# Patient Record
Sex: FEMALE | Race: WHITE | NOT HISPANIC OR LATINO | URBAN - METROPOLITAN AREA
[De-identification: names, ages, dates, MRNs, and addresses within clinical notes are randomized per-mention and may not be internally consistent; named-entity substitution may affect disease eponyms.]

---

## 2017-01-01 ENCOUNTER — EMERGENCY (EMERGENCY)
Facility: HOSPITAL | Age: 37
LOS: 1 days | Discharge: HOME | End: 2017-01-01
Attending: EMERGENCY MEDICINE | Admitting: EMERGENCY MEDICINE
Payer: MEDICARE

## 2017-01-01 VITALS
HEART RATE: 105 BPM | OXYGEN SATURATION: 96 % | RESPIRATION RATE: 16 BRPM | TEMPERATURE: 98 F | DIASTOLIC BLOOD PRESSURE: 84 MMHG | SYSTOLIC BLOOD PRESSURE: 145 MMHG

## 2017-01-01 NOTE — ED PROVIDER NOTE - PROGRESS NOTE DETAILS
Noted CXR. Pt denies any abd pain, nausea, vomiting, or change in BM. Abd soft, non-tender. Pt states cough improved with Tessalon Pearls.

## 2017-01-01 NOTE — ED PROVIDER NOTE - OBJECTIVE STATEMENT
35 y/o female with PMHx of MR, Hypothyroidism, and Bipolar presents to ED for cough X days. Pt states having a non-productive cough over the past few days. Pt denies any fever, chills, SOB, chest pain, or abd pain.

## 2017-01-02 PROCEDURE — 71020: CPT | Mod: 26

## 2017-01-02 PROCEDURE — 99283 EMERGENCY DEPT VISIT LOW MDM: CPT | Mod: 25

## 2017-01-02 RX ADMIN — Medication 100 MILLIGRAM(S): at 00:43

## 2017-01-16 ENCOUNTER — EMERGENCY (EMERGENCY)
Facility: HOSPITAL | Age: 37
LOS: 1 days | Discharge: HOME | End: 2017-01-16
Admitting: EMERGENCY MEDICINE
Payer: MEDICARE

## 2017-01-16 VITALS
OXYGEN SATURATION: 97 % | SYSTOLIC BLOOD PRESSURE: 105 MMHG | HEART RATE: 95 BPM | TEMPERATURE: 99 F | RESPIRATION RATE: 16 BRPM | DIASTOLIC BLOOD PRESSURE: 67 MMHG

## 2017-01-16 DIAGNOSIS — F68.8 OTHER SPECIFIED DISORDERS OF ADULT PERSONALITY AND BEHAVIOR: ICD-10-CM

## 2017-01-16 DIAGNOSIS — F31.9 BIPOLAR DISORDER, UNSPECIFIED: ICD-10-CM

## 2017-01-16 PROCEDURE — 90792 PSYCH DIAG EVAL W/MED SRVCS: CPT | Mod: GC

## 2017-01-16 PROCEDURE — 99284 EMERGENCY DEPT VISIT MOD MDM: CPT

## 2017-01-16 NOTE — ED PROVIDER NOTE - MEDICAL DECISION MAKING DETAILS
This is a 36 year olf Female PMHx Bipolar, Hypothyroid and MR BIBA from residence for psych eval r/t agitation Medical evaluation performed. There is no clinical evidence of intoxication or any acute medical problem requiring immediate intervention. Final disposition will be determined by psychiatrist.
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
## 2017-01-16 NOTE — ED BEHAVIORAL HEALTH ASSESSMENT NOTE - SUICIDE PROTECTIVE FACTORS
Supportive social network or family/Other/Positive therapeutic relationships/Identifies reasons for living/Future oriented Positive therapeutic relationships/Supportive social network or family/Identifies reasons for living/Future oriented

## 2017-01-16 NOTE — ED PROVIDER NOTE - OBJECTIVE STATEMENT
This is a 36 year olf Female PMHx Bipolar, Hypothyroid and MR BIBA from residence for psych eval r/t agitation. Patient states that after she went for ice cream with the group home, when she got out of the van, her knee started hurting and she demanded to go to the hospital. She became upset when she was offered Tylenol and got angry and stormed out of the residence and admits to getting aggressive with staff. Denies chest pain, SOB, N/V/D and fevers, Denies palpitations or diaphoresis. Denies Numbness, Tingling, Blurry Vision and HA.  Denies suicidal/homicidal thoughts. Denies visual/auditory hallucinations. Denies ETOH/Illicit drug use. Denies recent falls, trauma and injuries. Denies pain or any other medical complaints.
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
## 2017-01-16 NOTE — ED BEHAVIORAL HEALTH ASSESSMENT NOTE - CURRENT MEDICATION
lithium ER 450mg BID, Cogentin 1 mg BID, Invega ER 9mg QHS, Colace 2 caps QHS, Synthroid 0.075mg daily, senna 8.6 mg QHS, trazodone 50mg QHS, lactulose 30ml QHS, omega fish oil 1000mg BID, Topamax 200mg BID, Seroquel 200mg QHS, Calcium +Vit D 400IU daily, Klonopin 1 mg BID, Latanoprost 0.005% eye drops each eye QHS, ammonium lactate 12% apply to feet QHS, Aspercreme lotion, vitamin D3 1000units daily, Econazole nit 1% creme BID, Miconazorb AF 2% powder BID, Erythromycin BID.

## 2017-01-16 NOTE — ED BEHAVIORAL HEALTH ASSESSMENT NOTE - SAFETY PLAN DETAILS
The patient and family were advised to return to the Emergency Department or call 911 for any significant worsening of symptoms, and to restrict access to lethal means (pills, sharps, firearms, heights).  The patient and family agreed.

## 2017-01-16 NOTE — ED BEHAVIORAL HEALTH ASSESSMENT NOTE - DESCRIPTION
Patient is calm and cooperative, apologetic, and denying current suicidality or psychotic symptoms. obesity, hypothyroidism, glaucoma Lives in [facility] group home since Summer 2015

## 2017-01-16 NOTE — ED BEHAVIORAL HEALTH ASSESSMENT NOTE - RISK ASSESSMENT
Chronic risks include impulsivity, poor frustration tolerance, MR, past psychiatric admissions, and hx of violence and aggression. Protective factors include lack of current HI/SI, calm and cooperative in ER, supportive living situation, compliant with meds. No Chronic risks include impulsivity, poor frustration tolerance, MR, past psychiatric admissions, and hx of violence and aggression. Protective factors include lack of current HI/SI, calm and cooperative in ER, supportive living situation, compliant with meds. Inpatient treatment is unlikely to mitigate these chronic risks factors to any significant degree. She does not meet criteria for involuntary hospitalization.
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
## 2017-04-05 ENCOUNTER — EMERGENCY (EMERGENCY)
Facility: HOSPITAL | Age: 37
LOS: 1 days | Discharge: HOME | End: 2017-04-05
Attending: EMERGENCY MEDICINE | Admitting: EMERGENCY MEDICINE
Payer: MEDICARE

## 2017-04-05 VITALS
OXYGEN SATURATION: 99 % | TEMPERATURE: 98 F | HEART RATE: 78 BPM | SYSTOLIC BLOOD PRESSURE: 140 MMHG | RESPIRATION RATE: 18 BRPM | DIASTOLIC BLOOD PRESSURE: 90 MMHG

## 2017-04-05 PROCEDURE — 99283 EMERGENCY DEPT VISIT LOW MDM: CPT

## 2017-04-05 RX ORDER — IBUPROFEN 200 MG
600 TABLET ORAL ONCE
Qty: 0 | Refills: 0 | Status: COMPLETED | OUTPATIENT
Start: 2017-04-05 | End: 2017-04-05

## 2017-04-05 RX ORDER — ACETAMINOPHEN 500 MG
650 TABLET ORAL ONCE
Qty: 0 | Refills: 0 | Status: COMPLETED | OUTPATIENT
Start: 2017-04-05 | End: 2017-04-05

## 2017-04-05 RX ADMIN — Medication 650 MILLIGRAM(S): at 11:09

## 2017-04-05 RX ADMIN — Medication 600 MILLIGRAM(S): at 11:09

## 2017-04-05 NOTE — ED PROVIDER NOTE - OBJECTIVE STATEMENT
Pt c/o back pain, chronic per staff member- pt is in a group home.  No trauma, fever, dysuria.  Pt goes to PT and is on chronic pain management.  No numb/weak.  Tylenol/motrin usually work

## 2017-04-05 NOTE — ED PROVIDER NOTE - CARE PLAN
Principal Discharge DX:	Back pain  Goal:	followup care  Instructions for follow-up, activity and diet:	followup with your doctor and physical therapy in the next few days.  Take your usual pain medications for the back pain.  If worse pain, numbness, weakness, fever, pain with urination, or unable to walk or any worse symptoms return.  No heavy lifting.

## 2017-04-05 NOTE — ED ADULT TRIAGE NOTE - CHIEF COMPLAINT QUOTE
Patient brought to ER from [facility] ([phone] [place] [address]) for c/o lower back and knee pain.

## 2017-04-05 NOTE — ED PROVIDER NOTE - PLAN OF CARE
followup care followup with your doctor and physical therapy in the next few days.  Take your usual pain medications for the back pain.  If worse pain, numbness, weakness, fever, pain with urination, or unable to walk or any worse symptoms return.  No heavy lifting.

## 2017-04-05 NOTE — ED PROVIDER NOTE - CONSTITUTIONAL, MLM
normal... Well appearing, well nourished, awake, alert, oriented to person, place, time/situation and in mild pain distress.

## 2017-06-26 ENCOUNTER — APPOINTMENT (OUTPATIENT)
Dept: OBGYN | Facility: CLINIC | Age: 37
End: 2017-06-26

## 2017-06-26 VITALS
SYSTOLIC BLOOD PRESSURE: 112 MMHG | HEIGHT: 65 IN | DIASTOLIC BLOOD PRESSURE: 78 MMHG | WEIGHT: 288 LBS | BODY MASS INDEX: 47.98 KG/M2 | HEART RATE: 105 BPM

## 2017-06-26 DIAGNOSIS — Z86.69 PERSONAL HISTORY OF OTHER DISEASES OF THE NERVOUS SYSTEM AND SENSE ORGANS: ICD-10-CM

## 2017-06-26 DIAGNOSIS — Z86.59 PERSONAL HISTORY OF OTHER MENTAL AND BEHAVIORAL DISORDERS: ICD-10-CM

## 2017-06-26 RX ORDER — DOCUSATE SODIUM 100 MG/1
CAPSULE ORAL
Refills: 0 | Status: ACTIVE | COMMUNITY

## 2017-06-26 RX ORDER — MICONAZOLE NITRATE 20 MG/G
2 CREAM VAGINAL
Refills: 0 | Status: ACTIVE | COMMUNITY

## 2017-06-26 RX ORDER — LATANOPROST 50 UG/ML
0.01 SOLUTION OPHTHALMIC
Refills: 0 | Status: ACTIVE | COMMUNITY

## 2017-06-26 RX ORDER — MICONAZOLE
POWDER (GRAM) MISCELLANEOUS
Refills: 0 | Status: ACTIVE | COMMUNITY

## 2017-06-26 RX ORDER — SENNOSIDES 8.6 MG TABLETS 8.6 MG/1
TABLET ORAL
Refills: 0 | Status: ACTIVE | COMMUNITY

## 2017-06-28 LAB
DHEA-S SERPL-MCNC: 82.5 UG/DL
ESTRADIOL SERPL-MCNC: 26 PG/ML
FSH SERPL-MCNC: 8.5 IU/L
HCG SERPL-MCNC: <1 MIU/ML
HPV HIGH+LOW RISK DNA PNL CVX: NEGATIVE
LH SERPL-ACNC: 9.7 IU/L
PROLACTIN SERPL-MCNC: 78.7 NG/ML
TSH SERPL-ACNC: 6.33 UIU/ML

## 2017-07-03 LAB
CYTOLOGY CVX/VAG DOC THIN PREP: NORMAL
TESTOST BND SERPL-MCNC: 1.8 PG/ML
TESTOST SERPL-MCNC: 25.5 NG/DL

## 2017-07-24 ENCOUNTER — APPOINTMENT (OUTPATIENT)
Dept: OBGYN | Facility: CLINIC | Age: 37
End: 2017-07-24

## 2017-07-24 VITALS
DIASTOLIC BLOOD PRESSURE: 68 MMHG | WEIGHT: 293 LBS | SYSTOLIC BLOOD PRESSURE: 95 MMHG | HEART RATE: 80 BPM | BODY MASS INDEX: 48.82 KG/M2 | HEIGHT: 65 IN

## 2018-01-11 ENCOUNTER — APPOINTMENT (OUTPATIENT)
Dept: OBGYN | Facility: CLINIC | Age: 38
End: 2018-01-11
Payer: MEDICARE

## 2018-01-11 VITALS
DIASTOLIC BLOOD PRESSURE: 64 MMHG | WEIGHT: 286 LBS | HEIGHT: 65 IN | SYSTOLIC BLOOD PRESSURE: 99 MMHG | BODY MASS INDEX: 47.65 KG/M2 | HEART RATE: 86 BPM

## 2018-01-11 PROCEDURE — 99213 OFFICE O/P EST LOW 20 MIN: CPT

## 2018-01-12 LAB
HCG SERPL-MCNC: <1 MIU/ML
PROLACTIN SERPL-MCNC: 112.2 NG/ML
TSH SERPL-ACNC: 9.02 UIU/ML

## 2018-01-19 ENCOUNTER — ASOB RESULT (OUTPATIENT)
Age: 38
End: 2018-01-19

## 2018-01-19 ENCOUNTER — APPOINTMENT (OUTPATIENT)
Dept: OBGYN | Facility: CLINIC | Age: 38
End: 2018-01-19
Payer: MEDICARE

## 2018-01-19 PROCEDURE — 76856 US EXAM PELVIC COMPLETE: CPT

## 2018-01-22 NOTE — ED BEHAVIORAL HEALTH ASSESSMENT NOTE - ABNORMAL MOVEMENTS
1/22/2018       RE: Blank Yost  [address] [facility] [address]     Dear Colleague,    Thank you for referring your patient, Blank Yost, to the [facility] at [facility]. Please see a copy of my visit note below.    Referring provider: Jacy Amanda MD    Chief complaint: Left knee pain.    History of present illness: Blank is a very pleasant 37 year-old female who presents for evaluation of long-standing chronic left knee pain.  She reports over 20 years of left knee pain, progressively worsening.  She describes activity-related sharp and aching pain globally about the knee but most intense about the patellofemoral joint.  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  She notes intermittent periods of swelling about the knee.  She recently underwent a left knee arthroscopy, debridement, loose body removal, and chondroplasty of the patella, trochlear groove and medial femoral condyle.  This was performed by Dr. Shane Blackwood at [facility] in [place], [place] on 9/14/2017.  She reports no relief from this procedure and wonders if it even made her worse.  She feels debilitated by the pain.  Nothing has seemed to help her.    Past medical history: Tobacco dependence, chronic pain, depression, anxiety, gastroesophageal reflux disease.    Past surgical history: Left knee arthroscopy on 9/14/2017 as above.  Lumbar discectomy 2.5 years ago.    Medications: Cymbalta, Celexa, Abilify, buspirone, omeprazole, gabapentin, multivitamin.  Oxycodone as needed (reports taking on average 5 tabs per week; this is prescribed by her primary care provider).    Allergies: Sulfa drugs and Prozac.    Family history: Father had a history of a provoked DVT.  Otherwise no known genetic or family history of bleeding, clotting, or anesthesia related complications.    Review of systems: PHILIPP aguilar of systems attached to the bottom this  document and reviewed.    Social history: Patient lives in [facility] in a house with her boyfriend to him and her 13-year-old son.  She is worked in housekeeping since April 2017.  Is a full-time job and is quite physically demanding for her.  She does not participate in any activities or exercise.  She enjoys playing cards.  She does smoke 1 pack of cigarettes per day and has done so for 22 years.  She reports a previous history of alcohol dependence and has been sober for 5 years, with a brief relapse 16 months ago.  She denies any illicit drug use.    Physical exam: Pleasant 37-year-old female in no apparent distress.  Mood and affect somewhat blunted.  BMI 33.  Respirations nonlabored on room air.  Pleasant and cooperative with exam.  Extraocular eye movements intact.  Focused examination of the left knee demonstrate a warm and well-perfused foot with sensation intact light touch in the superficial peroneal, deep peroneal, tibial, saphenous, and sural nerve distributions.  She has 5 out of 5 motor strength to EHL, FHL, tibialis anterior, gastrocsoleus complex strength testing.  Her left knee demonstrates a moderate joint effusion and tenderness along the medial and lateral joint lines.  Knee range of motion is restricted from 10  shy of full extension to 125  of flexion, limited by pain at both extremes.  Midline patellar tracking with mild lateral patellar facet crepitus noted.  2 quadrants of lateral patellar laxity, 1 quadrant medial.  Knee is stable to varus and valgus stress at 0 and 30  of knee flexion.  Grade 0 Lachman's.    Imaging: AP and lateral radiographs of the left knee taken 8/1/2017 were reviewed.  This demonstrates well-maintained joint spaces with mild patella christopher.  IS ratio 1.59, CD ratio 1.35.    MRI dated 8/8/2017 was reviewed.  This demonstrates significant chondral wear of the lateral patellar facet.  The trochlea appears to be well preserved.  There are small areas of grade 3  chondral wear along the medial and lateral femoral condyles, but this is not extensive. TT-TG measures to be 10 mm. The medial and lateral meniscus are intact.  ACL, PCL, MCL, and LCL are intact.    Impression:  1.  Left knee patellofemoral arthritis, with high-grade chondral wear of the lateral patellar facet.  2.  Mild patella christopher with IS ratio 1.59 and CD ratio 1.35.  3.  TT-TG 10 mm.    4.  Status post left knee arthroscopy and chondroplasty without any relief.  5.  Chronic narcotic dependence.  6.  Tobacco dependence, 1 pack a day smoker.  7.  BMI 33.    Plan: We had a long discussion with Pham and her boyfriend today.  She reports significant and disabling ongoing left knee pain symptomatology.  We discussed the non-operative and surgical treatment options with her in detail at this point.  Non-operative options include activity modification, weight loss, physical therapy for strengthening, over-the-counter pain medications and anti-inflammatories, and corticosteroid injections.  We also discussed the option of trying patellofemoral taping with the physical therapist.  This may offload her lateral patellar facet and provide her significant benefit.    With regards to her surgical options, we did discuss the possibility of a cartilage reconstruction surgery (ACI vs caritform) of her lateral patellar facet that would include an anterior medialization of her tibial tubercle to offload this area.  This procedure was discussed in detail with the patient today.  We discussed the risks, benefits, and alternatives.  We offered no guarantees that this would make her better.  We also discussed that she would need to stop smoking in order to be a candidate for this type of extensive surgery.  She would like to think on this and she will contact us if she is able to quit smoking for a minimum of 2-3 months prior to surgery.  In the meantime she will follow-up with us on an as-needed basis moving forward.  All questions  and concerns were addressed.      Answers for HPI/ROS submitted by the patient on 1/22/2018   General Symptoms: Yes  Skin Symptoms: No  HENT Symptoms: No  EYE SYMPTOMS: No  HEART SYMPTOMS: No  LUNG SYMPTOMS: No  INTESTINAL SYMPTOMS: No  URINARY SYMPTOMS: No  GYNECOLOGIC SYMPTOMS: No  BREAST SYMPTOMS: Yes  SKELETAL SYMPTOMS: Yes  BLOOD SYMPTOMS: No  NERVOUS SYSTEM SYMPTOMS: No  MENTAL HEALTH SYMPTOMS: Yes  Fever: No  Loss of appetite: No  Weight loss: No  Weight gain: No  Fatigue: Yes  Night sweats: No  Chills: No  Increased stress: Yes  Excessive hunger: No  Excessive thirst: No  Feeling hot or cold when others believe the temperature is normal: No  Loss of height: No  Post-operative complications: No  Surgical site pain: No  Hallucinations: No  Change in or Loss of Energy: No  Hyperactivity: No  Confusion: No  Back pain: Yes  Muscle aches: No  Neck pain: No  Swollen joints: Yes  Joint pain: Yes  Bone pain: No  Muscle cramps: No  Muscle weakness: No  Joint stiffness: Yes  Bone fracture: No  Discharge: No  Lumps: Yes  Pain: Yes  Nipple retraction: No  Nervous or Anxious: Yes  Depression: Yes  Trouble sleeping: Yes  Trouble thinking or concentrating: Yes  Mood changes: Yes  Panic attacks: No        Patient seen and examined with the resident.     Assesment:  1. Full thickness wear of lateral patella facet    2. Patella Francis    3. TTTG - 10     4. Failure to improve following arthroscopic debridement, chondroplasty, loose body removal    5. Smoking    6. Body mass index is 32.96 kg/(m^2).      Plan: Reviewed options, I think the patients smoking, limits current options for cartilage reconstruction. I do think that age, activity level and Full thickness cartilage wear makes patient a candidate when she stops smoking.     She will work to stop smoking now, will call me after cessation x 2 months, and we can begin to plan out a time 1 month further down the road (3 months total of cessation prior to surgery).     Nonop  options include - nsaids, tylenol, relative rest, activity modification, lateral to medial patella taping (Otto) and intermittent use of injections.    I agree with history, physical and imaging as well as the assessment and plan as detailed by Dr. Cummins.       Again, thank you for allowing me to participate in the care of your patient.      Sincerely,    Daniel Santana MD       No abnormal movements

## 2018-01-23 ENCOUNTER — CHART COPY (OUTPATIENT)
Age: 38
End: 2018-01-23

## 2018-02-07 ENCOUNTER — APPOINTMENT (OUTPATIENT)
Dept: ENDOCRINOLOGY | Facility: CLINIC | Age: 38
End: 2018-02-07
Payer: MEDICARE

## 2018-02-07 ENCOUNTER — LABORATORY RESULT (OUTPATIENT)
Age: 38
End: 2018-02-07

## 2018-02-07 VITALS
BODY MASS INDEX: 47.48 KG/M2 | OXYGEN SATURATION: 97 % | HEIGHT: 65 IN | WEIGHT: 285 LBS | SYSTOLIC BLOOD PRESSURE: 110 MMHG | HEART RATE: 83 BPM | DIASTOLIC BLOOD PRESSURE: 70 MMHG

## 2018-02-07 PROCEDURE — 99205 OFFICE O/P NEW HI 60 MIN: CPT

## 2018-02-08 LAB
T4 FREE SERPL-MCNC: 0.9 NG/DL
TSH SERPL-ACNC: 8.5 UIU/ML

## 2018-02-09 LAB
THYROGLOB AB SERPL-ACNC: 9364 IU/ML
THYROPEROXIDASE AB SERPL IA-ACNC: 2242 IU/ML

## 2018-03-07 ENCOUNTER — APPOINTMENT (OUTPATIENT)
Dept: OBGYN | Facility: CLINIC | Age: 38
End: 2018-03-07

## 2018-03-19 ENCOUNTER — APPOINTMENT (OUTPATIENT)
Dept: OBGYN | Facility: CLINIC | Age: 38
End: 2018-03-19
Payer: MEDICARE

## 2018-03-19 VITALS
SYSTOLIC BLOOD PRESSURE: 115 MMHG | HEART RATE: 90 BPM | HEIGHT: 65 IN | BODY MASS INDEX: 47.65 KG/M2 | DIASTOLIC BLOOD PRESSURE: 74 MMHG | WEIGHT: 286 LBS

## 2018-03-19 PROCEDURE — 99213 OFFICE O/P EST LOW 20 MIN: CPT

## 2018-04-10 ENCOUNTER — APPOINTMENT (OUTPATIENT)
Dept: OBGYN | Facility: CLINIC | Age: 38
End: 2018-04-10
Payer: MEDICARE

## 2018-04-10 ENCOUNTER — ASOB RESULT (OUTPATIENT)
Age: 38
End: 2018-04-10

## 2018-04-10 VITALS
HEIGHT: 65 IN | SYSTOLIC BLOOD PRESSURE: 121 MMHG | DIASTOLIC BLOOD PRESSURE: 78 MMHG | WEIGHT: 287 LBS | BODY MASS INDEX: 47.82 KG/M2 | HEART RATE: 88 BPM

## 2018-04-10 PROCEDURE — 76856 US EXAM PELVIC COMPLETE: CPT

## 2018-04-10 PROCEDURE — 99214 OFFICE O/P EST MOD 30 MIN: CPT

## 2018-05-03 ENCOUNTER — APPOINTMENT (OUTPATIENT)
Dept: ENDOCRINOLOGY | Facility: CLINIC | Age: 38
End: 2018-05-03
Payer: MEDICARE

## 2018-05-03 VITALS
RESPIRATION RATE: 16 BRPM | OXYGEN SATURATION: 99 % | DIASTOLIC BLOOD PRESSURE: 60 MMHG | WEIGHT: 287 LBS | HEART RATE: 80 BPM | SYSTOLIC BLOOD PRESSURE: 110 MMHG | HEIGHT: 65 IN | BODY MASS INDEX: 47.82 KG/M2

## 2018-05-03 PROCEDURE — 99214 OFFICE O/P EST MOD 30 MIN: CPT

## 2018-05-08 ENCOUNTER — OTHER (OUTPATIENT)
Age: 38
End: 2018-05-08

## 2018-05-08 LAB
PROLACTIN SERPL-MCNC: 46.2 NG/ML
TSH SERPL-ACNC: 5.17 UIU/ML

## 2018-05-16 ENCOUNTER — OTHER (OUTPATIENT)
Age: 38
End: 2018-05-16

## 2018-06-25 ENCOUNTER — EMERGENCY (EMERGENCY)
Facility: HOSPITAL | Age: 38
LOS: 1 days | Discharge: HOME | End: 2018-06-25
Attending: EMERGENCY MEDICINE | Admitting: EMERGENCY MEDICINE
Payer: MEDICARE

## 2018-06-25 VITALS
HEART RATE: 88 BPM | SYSTOLIC BLOOD PRESSURE: 133 MMHG | RESPIRATION RATE: 16 BRPM | DIASTOLIC BLOOD PRESSURE: 91 MMHG | OXYGEN SATURATION: 100 % | TEMPERATURE: 99 F

## 2018-06-25 PROCEDURE — 99283 EMERGENCY DEPT VISIT LOW MDM: CPT | Mod: 25

## 2018-06-25 NOTE — ED ADULT NURSE REASSESSMENT NOTE - NS ED NURSE REASSESS COMMENT FT1
pt DC instructions provided by MD. pt belongings returned. pt leaving the unit with no signs of agitation/aggression. pt calmly getting dressed and leaving with Aide
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
## 2018-06-25 NOTE — ED PROVIDER NOTE - CARE PLAN
Principal Discharge DX:	Bipolar disorder Principal Discharge DX:	Bipolar disorder  Assessment and plan of treatment:	Return before insulting or injuring others if feelings of anger, agitation, or frustration occur.
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
## 2018-06-25 NOTE — ED ADULT NURSE NOTE - OBJECTIVE STATEMENT
38y F aAOx3 came in with aide from agitation and aggression. pt calm and cooperative upon initial assessment. pt denies depression, anger, SI, HI, AH, and VH at this time. pt belongings locked up. pt shows no signs of harm/trauma at this time. pt resting in room 3 awaiting MD
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
## 2018-07-17 ENCOUNTER — OTHER (OUTPATIENT)
Age: 38
End: 2018-07-17

## 2018-07-28 ENCOUNTER — EMERGENCY (EMERGENCY)
Facility: HOSPITAL | Age: 38
LOS: 1 days | Discharge: HOME | End: 2018-07-28
Attending: EMERGENCY MEDICINE | Admitting: EMERGENCY MEDICINE
Payer: MEDICARE

## 2018-07-28 VITALS
HEART RATE: 84 BPM | SYSTOLIC BLOOD PRESSURE: 110 MMHG | OXYGEN SATURATION: 100 % | DIASTOLIC BLOOD PRESSURE: 74 MMHG | RESPIRATION RATE: 16 BRPM

## 2018-07-28 VITALS
SYSTOLIC BLOOD PRESSURE: 105 MMHG | OXYGEN SATURATION: 99 % | TEMPERATURE: 98 F | RESPIRATION RATE: 18 BRPM | HEART RATE: 89 BPM | DIASTOLIC BLOOD PRESSURE: 72 MMHG

## 2018-07-28 LAB
ALBUMIN SERPL ELPH-MCNC: 3.7 G/DL — SIGNIFICANT CHANGE UP (ref 3.3–5)
ALP SERPL-CCNC: 66 U/L — SIGNIFICANT CHANGE UP (ref 40–120)
ALT FLD-CCNC: 20 U/L — SIGNIFICANT CHANGE UP (ref 4–33)
APTT BLD: 30.2 SEC — SIGNIFICANT CHANGE UP (ref 27.5–37.4)
AST SERPL-CCNC: 21 U/L — SIGNIFICANT CHANGE UP (ref 4–32)
BASOPHILS # BLD AUTO: 0.08 K/UL — SIGNIFICANT CHANGE UP (ref 0–0.2)
BASOPHILS NFR BLD AUTO: 0.7 % — SIGNIFICANT CHANGE UP (ref 0–2)
BILIRUB SERPL-MCNC: < 0.2 MG/DL — LOW (ref 0.2–1.2)
BUN SERPL-MCNC: 16 MG/DL — SIGNIFICANT CHANGE UP (ref 7–23)
CALCIUM SERPL-MCNC: 8.5 MG/DL — SIGNIFICANT CHANGE UP (ref 8.4–10.5)
CHLORIDE SERPL-SCNC: 108 MMOL/L — HIGH (ref 98–107)
CO2 SERPL-SCNC: 22 MMOL/L — SIGNIFICANT CHANGE UP (ref 22–31)
CREAT SERPL-MCNC: 0.81 MG/DL — SIGNIFICANT CHANGE UP (ref 0.5–1.3)
EOSINOPHIL # BLD AUTO: 0.21 K/UL — SIGNIFICANT CHANGE UP (ref 0–0.5)
EOSINOPHIL NFR BLD AUTO: 1.9 % — SIGNIFICANT CHANGE UP (ref 0–6)
GLUCOSE SERPL-MCNC: 105 MG/DL — HIGH (ref 70–99)
HCG SERPL-ACNC: < 5 MIU/ML — SIGNIFICANT CHANGE UP
HCT VFR BLD CALC: 36.9 % — SIGNIFICANT CHANGE UP (ref 34.5–45)
HGB BLD-MCNC: 11.5 G/DL — SIGNIFICANT CHANGE UP (ref 11.5–15.5)
IMM GRANULOCYTES # BLD AUTO: 0.03 # — SIGNIFICANT CHANGE UP
IMM GRANULOCYTES NFR BLD AUTO: 0.3 % — SIGNIFICANT CHANGE UP (ref 0–1.5)
INR BLD: 0.93 — SIGNIFICANT CHANGE UP (ref 0.88–1.17)
LYMPHOCYTES # BLD AUTO: 27.7 % — SIGNIFICANT CHANGE UP (ref 13–44)
LYMPHOCYTES # BLD AUTO: 3.11 K/UL — SIGNIFICANT CHANGE UP (ref 1–3.3)
MCHC RBC-ENTMCNC: 30.5 PG — SIGNIFICANT CHANGE UP (ref 27–34)
MCHC RBC-ENTMCNC: 31.2 % — LOW (ref 32–36)
MCV RBC AUTO: 97.9 FL — SIGNIFICANT CHANGE UP (ref 80–100)
MONOCYTES # BLD AUTO: 0.95 K/UL — HIGH (ref 0–0.9)
MONOCYTES NFR BLD AUTO: 8.5 % — SIGNIFICANT CHANGE UP (ref 2–14)
NEUTROPHILS # BLD AUTO: 6.84 K/UL — SIGNIFICANT CHANGE UP (ref 1.8–7.4)
NEUTROPHILS NFR BLD AUTO: 60.9 % — SIGNIFICANT CHANGE UP (ref 43–77)
NRBC # FLD: 0 — SIGNIFICANT CHANGE UP
PLATELET # BLD AUTO: 265 K/UL — SIGNIFICANT CHANGE UP (ref 150–400)
PMV BLD: 10.5 FL — SIGNIFICANT CHANGE UP (ref 7–13)
POTASSIUM SERPL-MCNC: 3.8 MMOL/L — SIGNIFICANT CHANGE UP (ref 3.5–5.3)
POTASSIUM SERPL-SCNC: 3.8 MMOL/L — SIGNIFICANT CHANGE UP (ref 3.5–5.3)
PROT SERPL-MCNC: 7.5 G/DL — SIGNIFICANT CHANGE UP (ref 6–8.3)
PROTHROM AB SERPL-ACNC: 10.3 SEC — SIGNIFICANT CHANGE UP (ref 9.8–13.1)
RBC # BLD: 3.77 M/UL — LOW (ref 3.8–5.2)
RBC # FLD: 12.8 % — SIGNIFICANT CHANGE UP (ref 10.3–14.5)
SODIUM SERPL-SCNC: 139 MMOL/L — SIGNIFICANT CHANGE UP (ref 135–145)
WBC # BLD: 11.22 K/UL — HIGH (ref 3.8–10.5)
WBC # FLD AUTO: 11.22 K/UL — HIGH (ref 3.8–10.5)

## 2018-07-28 PROCEDURE — 93970 EXTREMITY STUDY: CPT | Mod: 26

## 2018-07-28 PROCEDURE — 99284 EMERGENCY DEPT VISIT MOD MDM: CPT | Mod: 25,GC

## 2018-07-28 RX ORDER — ACETAMINOPHEN 500 MG
650 TABLET ORAL ONCE
Qty: 0 | Refills: 0 | Status: COMPLETED | OUTPATIENT
Start: 2018-07-28 | End: 2018-07-28

## 2018-07-28 RX ADMIN — Medication 650 MILLIGRAM(S): at 05:44

## 2018-07-28 NOTE — ED PROVIDER NOTE - PLAN OF CARE
You were seen today for right leg pain, there was no evidence of infection or blood clot. You should elevate your right leg at night when you sleep, apply warm compresses to help with pain and continue to take Tylenol 650mg every 4-6 hours as needed for pain and advil 600mg with meals every 8 hours as needed for pain. Return for severe worsening of pain or signs of infection such as swelling and redness.     1) Please follow-up with your primary care doctor within the next 3 days.  Please call today or tomorrow for an appointment.  If you cannot follow-up with your doctor(s), please return to the ED for any urgent issues.  2) If you have any worsening of symptoms or any other concerns please return to the ED immediately.  3) Please continue taking your home medications as directed.  4) You may have been given a copy of your labs and/or imaging.  Please go over these with your primary care doctor.

## 2018-07-28 NOTE — ED ADULT NURSE NOTE - OBJECTIVE STATEMENT
38yof a&ox4 amb presents to ed 19 c/o R lower leg pain x weeks. pt reports pain comes and goes w/ unclear aggravating factors. pt denies any falls/injury to the area. no signs of redness, warmth, swelling noted. no calf tenderness present upon exam. pt recently seen at another ED for same symptoms w/ negative exam. denies cp, sob, dizziness, lightheadedness, n/v/d, fever/chills. breathing even/unlabored. abdomen soft, nontender, nondistended. skin warm, dry, and intact. vss.

## 2018-07-28 NOTE — ED PROVIDER NOTE - ATTENDING CONTRIBUTION TO CARE
MD Lindsay:  I performed a face to face bedside interview with patient regarding history of present illness, review of symptoms and past medical history. I completed an independent physical exam(documented below).  I have discussed patient's plan of care with resident.   I agree with note as stated above, having amended the EMR as needed to reflect my findings. I have discussed the assessment and plan of care.  This includes during the time I functioned as the attending physician for this patient.  PE:  Gen: Alert, NAD  Head: NC, AT,  EOMI, normal lids/conjunctiva  ENT:  normal hearing, patent oropharynx without erythema/exudate  Neck: +supple, no tenderness/meningismus/JVD, +Trachea midline  Chest: no chest wall tenderness, equal chest rise  Pulm: Bilateral BS, normal resp effort, no wheeze/stridor/retractions  CV: RRR, no M/R/G, +dist pulses  Abd: +BS, soft, NT/ND  Rectal: deferred  Mskel: no edema/erythema/cyanosis, +ttp R ankle up to proximal R calf1  Skin: no rash  Neuro: AAOx3, no sensory/motor deficits, CN 2-12 intact   MDM:  37yo F w/ pmh inclusive of MR, hypothyroidism, bipolar disorder, OA, and obesity, c/o acute on chronic RLE pain from R ankle up to R knee, worsened over last few weeks, seen at OSH a couple of wks ago where she had ?dvt study; states pain is worsening and difficulty ambulating. +unknown amount of weight gain over the last 6months. No recent travel or hx of dvt. Ddx includes OA vs dvt. No suspicion for fracture or infection at this time. dvt study, pain meds, pt ambulating without difficulty here, likely dc w/ outpt ortho or pm&R f/u.

## 2018-07-28 NOTE — ED ADULT NURSE NOTE - CHIEF COMPLAINT QUOTE
Pt ambulatory to triage arrives via [employer] as per EMT" Sent from Group home for c/o rt leg pain that has been coming & going.." Staff " Jenny " with pt.  Pt denies falling . Pt st" My whole rt leg hurts....I have hx of thryroid, glaucoma and bipolar I take medications for . This pain in my leg has been for weeks, they gave me pain medications but not helping and I also use pain cream ....it comes and goes...it is annoying. ."   pt localizing pain to rt ankle area and rt lateral knee area. + mild swelling to laquita lower ankles, no reddness. + laquita pedial pulse =strong. Pt hyperverbal, emotionally labile intermittently tearful and loud then pleasant and smiling, frequently , calling out" look at my leg... look at it."   As per EMT" Pt was intially agitated ran out of house was redirected back into the house."

## 2018-07-28 NOTE — ED ADULT TRIAGE NOTE - CHIEF COMPLAINT QUOTE
Pt ambulatory to triage arrives via [employer] as per EMT" Sent from Group home for c/o rt leg pain that has been reaccurent. as per Staff" Jenny"  with Pt." Pt denies falling . Pt st" My whole rt leg hurts....I have hx of thryroid, glaucoma and bipolar I take medications for . This pain in my leg has been for weeks, they gave me pain medications but not helping and I also use pain cream ....it comes and goes...it is annoying. ." Pt localizing pain to rt ankle area and rt lateral knee area. + mild swelling to laquita lower ankles, no reddness. Pt hyperverbal, emotionally labile intermittently tearful and loud, calling out look at my leg look at it . As per EMT" Pt was intially agitated ran out of house was redirected back into the house." Pt ambulatory to triage arrives via [employer] as per EMT" Sent from Group home for c/o rt leg pain that has been reaccurent." Staff " Jenny " with pt.  Pt denies falling . Pt st" My whole rt leg hurts....I have hx of thryroid, glaucoma and bipolar I take medications for . This pain in my leg has been for weeks, they gave me pain medications but not helping and I also use pain cream ....it comes and goes...it is annoying. ." Pt localizing pain to rt ankle area and rt lateral knee area. + mild swelling to laquita lower ankles, no reddness. Pt hyperverbal, emotionally labile intermittently tearful and loud, calling out look at my leg look at it . As per EMT" Pt was intially agitated ran out of house was redirected back into the house." Pt ambulatory to triage arrives via [employer] as per EMT" Sent from Group home for c/o rt leg pain that has been coming & going.." Staff " Jenny " with pt.  Pt denies falling . Pt st" My whole rt leg hurts....I have hx of thryroid, glaucoma and bipolar I take medications for . This pain in my leg has been for weeks, they gave me pain medications but not helping and I also use pain cream ....it comes and goes...it is annoying. ."   pt localizing pain to rt ankle area and rt lateral knee area. + mild swelling to laquita lower ankles, no reddness. + laquita pedial pulse =strong. Pt hyperverbal, emotionally labile intermittently tearful and loud then pleasant and smiling, frequently , calling out" look at my leg... look at it."   As per EMT" Pt was intially agitated ran out of house was redirected back into the house."

## 2018-07-28 NOTE — ED PROVIDER NOTE - PSYCHIATRIC LEVEL OF CONSCIOUSNESS
57 y/o female with SLE, systolic CHF (EF 32%) s/p AICD, HTN, CKD III- IV, PE / left leg DVT dx 2009, HIT, CAD (no stents), stage IV sacral ulcer, chronic fox, hx of recurrent pericardial effusion, AAA ~14cm in 12/2016 s/p repair in 2010 w/ aortoiliac stent c/b post-operatively with development of left lower extremity lymphedema December 2016;  pericardial effusion pericardial drain/SLE. now with ILANA on CKD with baseline creatinine approx 1.8-2, possible gout flare follows commands/alert

## 2018-07-28 NOTE — ED PROVIDER NOTE - CARE PLAN
Principal Discharge DX:	Leg pain  Assessment and plan of treatment:	You were seen today for right leg pain, there was no evidence of infection or blood clot. You should elevate your right leg at night when you sleep, apply warm compresses to help with pain and continue to take Tylenol 650mg every 4-6 hours as needed for pain and advil 600mg with meals every 8 hours as needed for pain. Return for severe worsening of pain or signs of infection such as swelling and redness.     1) Please follow-up with your primary care doctor within the next 3 days.  Please call today or tomorrow for an appointment.  If you cannot follow-up with your doctor(s), please return to the ED for any urgent issues.  2) If you have any worsening of symptoms or any other concerns please return to the ED immediately.  3) Please continue taking your home medications as directed.  4) You may have been given a copy of your labs and/or imaging.  Please go over these with your primary care doctor.

## 2018-07-28 NOTE — ED PROVIDER NOTE - MEDICAL DECISION MAKING DETAILS
38f w hx bipolar disorder, intellectual disability, hypothyroidism, here today c/o RLE pain. No obvious signs dvt however given complaints will r/o. Also pain ctrl, basic labs, reassess

## 2018-07-28 NOTE — ED PROVIDER NOTE - PROGRESS NOTE DETAILS
Cristopher PGY2: Patient signed out to me, pending LE duplex, noted had presented to another hospital with negative duplex, low concern for infection, will reassess pending results. Cristopher PGY2: Patient signed out to me, pending LE duplex, noted had presented to another hospital with negative duplex, no signs of infection, compartments soft, no pain on palpation, will reassess pending results. Cristopher PGY2: ambulatory, no pain on re-exam, discussed results and f/u info w/ patient and aide

## 2018-07-28 NOTE — ED PROVIDER NOTE - OBJECTIVE STATEMENT
38f 38f w hx bipolar disorder, intellectual disability, hypothyroidism, here today c/o RLE pain. Pt states has had intermittent r leg pain for several wks. Went to [facility] ~2 w ago for similar where had "test for blood clot that was normal". Also feels r leg more swollen than left. Denies trauma to area. No fever, no sob or cp, no recent travel or immobilization. Has been ambulating w/o difficulty. States was given "a pill and a cream for the pain" at group home w little relief. 38f w hx bipolar disorder, intellectual disability, hypothyroidism, here today c/o RLE pain. Pt states has had intermittent r leg pain for several wks. Went to [facility] ~2 w ago for similar where had "test for blood clot that was normal". Also feels r leg more swollen than left. Denies trauma to area. No fever, no sob or cp, no recent travel or immobilization, not on OCPs. Has been ambulating w/o difficulty. States was given "a pill and a cream for the pain" at group home w little relief.

## 2018-07-28 NOTE — ED PROVIDER NOTE - LOCATION
r calf tenderness, pos Homans; no edema, no swelling relative to l (l appears slightly larger than r) r calf tenderness, pos Homans; no edema, no swelling relative to l (l appears slightly larger than r), no erythema

## 2018-08-07 ENCOUNTER — APPOINTMENT (OUTPATIENT)
Dept: ENDOCRINOLOGY | Facility: CLINIC | Age: 38
End: 2018-08-07
Payer: MEDICARE

## 2018-08-07 VITALS
HEART RATE: 83 BPM | DIASTOLIC BLOOD PRESSURE: 80 MMHG | SYSTOLIC BLOOD PRESSURE: 120 MMHG | OXYGEN SATURATION: 98 % | WEIGHT: 272 LBS | HEIGHT: 65 IN | BODY MASS INDEX: 45.32 KG/M2

## 2018-08-07 PROCEDURE — 99214 OFFICE O/P EST MOD 30 MIN: CPT

## 2018-08-13 ENCOUNTER — OTHER (OUTPATIENT)
Age: 38
End: 2018-08-13

## 2018-08-13 LAB
ANION GAP SERPL CALC-SCNC: 9 MMOL/L
BUN SERPL-MCNC: 13 MG/DL
CALCIUM SERPL-MCNC: 9.5 MG/DL
CHLORIDE SERPL-SCNC: 110 MMOL/L
CHOLEST SERPL-MCNC: 186 MG/DL
CHOLEST/HDLC SERPL: 3.9 RATIO
CO2 SERPL-SCNC: 23 MMOL/L
CREAT SERPL-MCNC: 0.66 MG/DL
GLUCOSE SERPL-MCNC: 93 MG/DL
HBA1C MFR BLD HPLC: 5.8 %
HDLC SERPL-MCNC: 48 MG/DL
LDLC SERPL CALC-MCNC: 118 MG/DL
POTASSIUM SERPL-SCNC: 4.3 MMOL/L
PROLACTIN SERPL-MCNC: 37.3 NG/ML
SODIUM SERPL-SCNC: 142 MMOL/L
TRIGL SERPL-MCNC: 102 MG/DL
TSH SERPL-ACNC: 4.65 UIU/ML

## 2018-08-22 ENCOUNTER — APPOINTMENT (OUTPATIENT)
Dept: OBGYN | Facility: CLINIC | Age: 38
End: 2018-08-22
Payer: MEDICARE

## 2018-08-22 VITALS
SYSTOLIC BLOOD PRESSURE: 120 MMHG | BODY MASS INDEX: 44.98 KG/M2 | WEIGHT: 270 LBS | DIASTOLIC BLOOD PRESSURE: 79 MMHG | HEIGHT: 65 IN | HEART RATE: 93 BPM

## 2018-08-22 PROCEDURE — 99213 OFFICE O/P EST LOW 20 MIN: CPT

## 2018-08-24 LAB
C TRACH RRNA SPEC QL NAA+PROBE: NOT DETECTED
CANDIDA VAG CYTO: NOT DETECTED
G VAGINALIS+PREV SP MTYP VAG QL MICRO: DETECTED
HCG SERPL-MCNC: <1 MIU/ML
N GONORRHOEA RRNA SPEC QL NAA+PROBE: NOT DETECTED
SOURCE AMPLIFICATION: NORMAL
T VAGINALIS VAG QL WET PREP: NOT DETECTED

## 2018-08-27 DIAGNOSIS — N76.0 ACUTE VAGINITIS: ICD-10-CM

## 2018-08-27 DIAGNOSIS — B96.89 ACUTE VAGINITIS: ICD-10-CM

## 2018-08-27 RX ORDER — METRONIDAZOLE 7.5 MG/G
0.75 GEL VAGINAL
Qty: 1 | Refills: 1 | Status: ACTIVE | COMMUNITY
Start: 2018-08-27 | End: 1900-01-01

## 2018-09-05 ENCOUNTER — RX RENEWAL (OUTPATIENT)
Age: 38
End: 2018-09-05

## 2018-09-05 RX ORDER — METRONIDAZOLE 500 MG/1
500 TABLET ORAL TWICE DAILY
Qty: 14 | Refills: 0 | Status: ACTIVE | COMMUNITY
Start: 2018-09-05 | End: 1900-01-01

## 2018-11-26 ENCOUNTER — EMERGENCY (EMERGENCY)
Facility: HOSPITAL | Age: 38
LOS: 1 days | Discharge: HOME | End: 2018-11-26
Admitting: EMERGENCY MEDICINE
Payer: MEDICARE

## 2018-11-26 VITALS
SYSTOLIC BLOOD PRESSURE: 106 MMHG | TEMPERATURE: 99 F | DIASTOLIC BLOOD PRESSURE: 60 MMHG | RESPIRATION RATE: 16 BRPM | HEART RATE: 76 BPM | OXYGEN SATURATION: 98 %

## 2018-11-26 VITALS
OXYGEN SATURATION: 98 % | TEMPERATURE: 98 F | RESPIRATION RATE: 16 BRPM | HEART RATE: 71 BPM | DIASTOLIC BLOOD PRESSURE: 79 MMHG | SYSTOLIC BLOOD PRESSURE: 120 MMHG

## 2018-11-26 PROCEDURE — 73562 X-RAY EXAM OF KNEE 3: CPT | Mod: 26,LT

## 2018-11-26 PROCEDURE — 99283 EMERGENCY DEPT VISIT LOW MDM: CPT | Mod: 25

## 2018-11-26 RX ORDER — ACETAMINOPHEN 500 MG
650 TABLET ORAL ONCE
Qty: 0 | Refills: 0 | Status: COMPLETED | OUTPATIENT
Start: 2018-11-26 | End: 2018-11-26

## 2018-11-26 RX ADMIN — Medication 650 MILLIGRAM(S): at 01:47

## 2018-11-26 NOTE — ED PROVIDER NOTE - OBJECTIVE STATEMENT
39 y/o female from a group home, brought by staff member, presents with c/o left knee pain x 1 day, pain began while she was walking today, denies any trauma, denies any numbness/weakness/tingling, has not taken any meds for her symptoms

## 2018-11-26 NOTE — ED ADULT TRIAGE NOTE - CHIEF COMPLAINT QUOTE
pt arrives from [facility] home with c/o left knee and calf pain. pt states she was walking all day and has arthritis. pt states took Tylenol and had no relief of pain.

## 2018-11-26 NOTE — ED ADULT NURSE NOTE - CHIEF COMPLAINT QUOTE
pt arrives from [facility] home with c/o left knee and calf pain. pt states she was walking all day and has arthritis. pt states took Tylenol and had no relief of pain.

## 2018-12-11 ENCOUNTER — APPOINTMENT (OUTPATIENT)
Dept: ENDOCRINOLOGY | Facility: CLINIC | Age: 38
End: 2018-12-11
Payer: MEDICARE

## 2018-12-11 VITALS
WEIGHT: 267 LBS | SYSTOLIC BLOOD PRESSURE: 120 MMHG | HEIGHT: 65 IN | HEART RATE: 78 BPM | DIASTOLIC BLOOD PRESSURE: 80 MMHG | BODY MASS INDEX: 44.48 KG/M2 | OXYGEN SATURATION: 98 %

## 2018-12-11 PROCEDURE — 99214 OFFICE O/P EST MOD 30 MIN: CPT

## 2018-12-13 ENCOUNTER — OTHER (OUTPATIENT)
Age: 38
End: 2018-12-13

## 2018-12-13 LAB
ANION GAP SERPL CALC-SCNC: 7 MMOL/L
BUN SERPL-MCNC: 14 MG/DL
CALCIUM SERPL-MCNC: 9.5 MG/DL
CHLORIDE SERPL-SCNC: 110 MMOL/L
CO2 SERPL-SCNC: 23 MMOL/L
CREAT SERPL-MCNC: 0.77 MG/DL
GLUCOSE SERPL-MCNC: 92 MG/DL
POTASSIUM SERPL-SCNC: 4.7 MMOL/L
PROLACTIN SERPL-MCNC: 70 NG/ML
SODIUM SERPL-SCNC: 140 MMOL/L
TSH SERPL-ACNC: 4.67 UIU/ML

## 2019-01-26 ENCOUNTER — EMERGENCY (EMERGENCY)
Facility: HOSPITAL | Age: 39
LOS: 1 days | Discharge: HOME | End: 2019-01-26
Admitting: EMERGENCY MEDICINE
Payer: MEDICARE

## 2019-01-26 VITALS
TEMPERATURE: 98 F | OXYGEN SATURATION: 100 % | DIASTOLIC BLOOD PRESSURE: 76 MMHG | RESPIRATION RATE: 18 BRPM | HEART RATE: 89 BPM | SYSTOLIC BLOOD PRESSURE: 116 MMHG

## 2019-01-26 PROCEDURE — 99283 EMERGENCY DEPT VISIT LOW MDM: CPT
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
## 2019-02-04 ENCOUNTER — APPOINTMENT (OUTPATIENT)
Dept: OBGYN | Facility: CLINIC | Age: 39
End: 2019-02-04

## 2019-02-26 ENCOUNTER — EMERGENCY (EMERGENCY)
Facility: HOSPITAL | Age: 39
LOS: 1 days | Discharge: HOME | End: 2019-02-26
Admitting: EMERGENCY MEDICINE
Payer: MEDICARE

## 2019-02-26 VITALS
OXYGEN SATURATION: 100 % | DIASTOLIC BLOOD PRESSURE: 64 MMHG | HEART RATE: 75 BPM | TEMPERATURE: 97 F | SYSTOLIC BLOOD PRESSURE: 116 MMHG | RESPIRATION RATE: 16 BRPM

## 2019-02-26 PROCEDURE — 99283 EMERGENCY DEPT VISIT LOW MDM: CPT

## 2019-02-26 NOTE — ED PROVIDER NOTE - CLINICAL SUMMARY MEDICAL DECISION MAKING FREE TEXT BOX
37 y/o female pt presents to [facility] for "they wouldn't share their soda with me".  Physical examination completed and unremarkable for any acute issues/problems requiring immediate interventions.  PT cleared for discharge with staff from her group home.
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
## 2019-02-26 NOTE — ED ADULT NURSE REASSESSMENT NOTE - NS ED NURSE REASSESS COMMENT FT1
pt calm & cooperative denies Si/Hi/AVh presently pt back to baseline d/c by NP resources provided to pt & staff.
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
## 2019-02-26 NOTE — ED ADULT NURSE NOTE - NSFALLRSKHARMRISK_ED_ALL_ED
"1720: Report received from DANIAL Daniel RN. Plan of care discussed. Patient walks to S220 at this time.  1724: Patient oriented to room. Patient placed on monitors at this time. MD on telephone at this time. MD places new orders at this time. RN states understanding.   Patient states \"small headache\"  Is present but denies blurred vision or epigastric pain. Will continue to monitor.  1800: FOB at bedside at this time.   2145: Report given to TAMI Whitaker RN. Plan of care discussed. Patient breathing through contraction at this time. FOB at bedside.     " no
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
## 2019-03-07 ENCOUNTER — EMERGENCY (EMERGENCY)
Facility: HOSPITAL | Age: 39
LOS: 1 days | Discharge: HOME | End: 2019-03-07
Admitting: EMERGENCY MEDICINE
Payer: MEDICARE

## 2019-03-07 VITALS
SYSTOLIC BLOOD PRESSURE: 92 MMHG | TEMPERATURE: 98 F | DIASTOLIC BLOOD PRESSURE: 56 MMHG | OXYGEN SATURATION: 98 % | HEART RATE: 81 BPM | RESPIRATION RATE: 17 BRPM

## 2019-03-07 PROCEDURE — 99283 EMERGENCY DEPT VISIT LOW MDM: CPT
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
## 2019-03-07 NOTE — ED ADULT NURSE NOTE - NSIMPLEMENTINTERV_GEN_ALL_ED
Implemented All Universal Safety Interventions:  [place] to call system. Call bell, personal items and telephone within reach. Instruct patient to call for assistance. Room bathroom lighting operational. Non-slip footwear when patient is off stretcher. Physically safe environment: no spills, clutter or unnecessary equipment. Stretcher in lowest position, wheels locked, appropriate side rails in place.
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
## 2019-03-07 NOTE — ED ADULT NURSE REASSESSMENT NOTE - NS ED NURSE REASSESS COMMENT FT1
Patient in improved and stable condition, discharged as per NP Janet order, discharge instructions given, pt verbalized understanding and left ER a&ox3 with staff from adult residence.

## 2019-03-18 ENCOUNTER — APPOINTMENT (OUTPATIENT)
Dept: OBGYN | Facility: CLINIC | Age: 39
End: 2019-03-18
Payer: MEDICARE

## 2019-03-18 VITALS
SYSTOLIC BLOOD PRESSURE: 95 MMHG | DIASTOLIC BLOOD PRESSURE: 64 MMHG | WEIGHT: 270 LBS | BODY MASS INDEX: 44.98 KG/M2 | HEART RATE: 72 BPM | HEIGHT: 65 IN

## 2019-03-18 PROCEDURE — G0101: CPT

## 2019-03-18 NOTE — PHYSICAL EXAM
[Awake] : awake [Alert] : alert [Acute Distress] : no acute distress [Mass] : no breast mass [Nipple Discharge] : no nipple discharge [Soft] : soft [Axillary LAD] : no axillary lymphadenopathy [Tender] : non tender [Oriented x3] : oriented to person, place, and time [No Bleeding] : there was no active vaginal bleeding [FreeTextEntry7] : refused internal pelvic exam [Normal] : vagina [FreeTextEntry4] : digital palpation only [FreeTextEntry5] : no bimanual exam could be performed due to patient discomfort

## 2019-03-19 ENCOUNTER — EMERGENCY (EMERGENCY)
Facility: HOSPITAL | Age: 39
LOS: 1 days | Discharge: HOME | End: 2019-03-19
Attending: EMERGENCY MEDICINE | Admitting: EMERGENCY MEDICINE
Payer: MEDICARE

## 2019-03-19 VITALS
DIASTOLIC BLOOD PRESSURE: 53 MMHG | TEMPERATURE: 98 F | HEART RATE: 71 BPM | OXYGEN SATURATION: 100 % | RESPIRATION RATE: 18 BRPM | SYSTOLIC BLOOD PRESSURE: 101 MMHG

## 2019-03-19 VITALS
SYSTOLIC BLOOD PRESSURE: 95 MMHG | HEART RATE: 83 BPM | DIASTOLIC BLOOD PRESSURE: 58 MMHG | RESPIRATION RATE: 15 BRPM | TEMPERATURE: 98 F | OXYGEN SATURATION: 100 %

## 2019-03-19 LAB
ALBUMIN SERPL ELPH-MCNC: 3.8 G/DL — SIGNIFICANT CHANGE UP (ref 3.3–5)
ALP SERPL-CCNC: 70 U/L — SIGNIFICANT CHANGE UP (ref 40–120)
ALT FLD-CCNC: 20 U/L — SIGNIFICANT CHANGE UP (ref 4–33)
ANION GAP SERPL CALC-SCNC: 7 MMO/L — SIGNIFICANT CHANGE UP (ref 7–14)
APPEARANCE UR: CLEAR — SIGNIFICANT CHANGE UP
AST SERPL-CCNC: 19 U/L — SIGNIFICANT CHANGE UP (ref 4–32)
BASE EXCESS BLDV CALC-SCNC: -1.3 MMOL/L — SIGNIFICANT CHANGE UP
BASOPHILS # BLD AUTO: 0.07 K/UL — SIGNIFICANT CHANGE UP (ref 0–0.2)
BASOPHILS NFR BLD AUTO: 0.7 % — SIGNIFICANT CHANGE UP (ref 0–2)
BILIRUB SERPL-MCNC: 0.3 MG/DL — SIGNIFICANT CHANGE UP (ref 0.2–1.2)
BILIRUB UR-MCNC: NEGATIVE — SIGNIFICANT CHANGE UP
BLOOD GAS VENOUS - CREATININE: 0.82 MG/DL — SIGNIFICANT CHANGE UP (ref 0.5–1.3)
BLOOD UR QL VISUAL: NEGATIVE — SIGNIFICANT CHANGE UP
BUN SERPL-MCNC: 11 MG/DL — SIGNIFICANT CHANGE UP (ref 7–23)
CALCIUM SERPL-MCNC: 8.9 MG/DL — SIGNIFICANT CHANGE UP (ref 8.4–10.5)
CHLORIDE BLDV-SCNC: 115 MMOL/L — HIGH (ref 96–108)
CHLORIDE SERPL-SCNC: 110 MMOL/L — HIGH (ref 98–107)
CO2 SERPL-SCNC: 24 MMOL/L — SIGNIFICANT CHANGE UP (ref 22–31)
COLOR SPEC: SIGNIFICANT CHANGE UP
CREAT SERPL-MCNC: 0.91 MG/DL — SIGNIFICANT CHANGE UP (ref 0.5–1.3)
EOSINOPHIL # BLD AUTO: 0.16 K/UL — SIGNIFICANT CHANGE UP (ref 0–0.5)
EOSINOPHIL NFR BLD AUTO: 1.7 % — SIGNIFICANT CHANGE UP (ref 0–6)
GAS PNL BLDV: 138 MMOL/L — SIGNIFICANT CHANGE UP (ref 136–146)
GLUCOSE BLDV-MCNC: 110 — HIGH (ref 70–99)
GLUCOSE SERPL-MCNC: 106 MG/DL — HIGH (ref 70–99)
GLUCOSE UR-MCNC: NEGATIVE — SIGNIFICANT CHANGE UP
HCG SERPL-ACNC: < 5 MIU/ML — SIGNIFICANT CHANGE UP
HCO3 BLDV-SCNC: 21 MMOL/L — SIGNIFICANT CHANGE UP (ref 20–27)
HCT VFR BLD CALC: 42.7 % — SIGNIFICANT CHANGE UP (ref 34.5–45)
HCT VFR BLDV CALC: 40 % — SIGNIFICANT CHANGE UP (ref 34.5–45)
HGB BLD-MCNC: 12.7 G/DL — SIGNIFICANT CHANGE UP (ref 11.5–15.5)
HGB BLDV-MCNC: 13 G/DL — SIGNIFICANT CHANGE UP (ref 11.5–15.5)
IMM GRANULOCYTES NFR BLD AUTO: 0.2 % — SIGNIFICANT CHANGE UP (ref 0–1.5)
KETONES UR-MCNC: NEGATIVE — SIGNIFICANT CHANGE UP
LACTATE BLDV-MCNC: 0.9 MMOL/L — SIGNIFICANT CHANGE UP (ref 0.5–2)
LEUKOCYTE ESTERASE UR-ACNC: NEGATIVE — SIGNIFICANT CHANGE UP
LIDOCAIN IGE QN: 46.3 U/L — SIGNIFICANT CHANGE UP (ref 7–60)
LYMPHOCYTES # BLD AUTO: 2.34 K/UL — SIGNIFICANT CHANGE UP (ref 1–3.3)
LYMPHOCYTES # BLD AUTO: 24.8 % — SIGNIFICANT CHANGE UP (ref 13–44)
MCHC RBC-ENTMCNC: 29.7 % — LOW (ref 32–36)
MCHC RBC-ENTMCNC: 30.5 PG — SIGNIFICANT CHANGE UP (ref 27–34)
MCV RBC AUTO: 102.4 FL — HIGH (ref 80–100)
MONOCYTES # BLD AUTO: 0.81 K/UL — SIGNIFICANT CHANGE UP (ref 0–0.9)
MONOCYTES NFR BLD AUTO: 8.6 % — SIGNIFICANT CHANGE UP (ref 2–14)
NEUTROPHILS # BLD AUTO: 6.02 K/UL — SIGNIFICANT CHANGE UP (ref 1.8–7.4)
NEUTROPHILS NFR BLD AUTO: 64 % — SIGNIFICANT CHANGE UP (ref 43–77)
NITRITE UR-MCNC: NEGATIVE — SIGNIFICANT CHANGE UP
NRBC # FLD: 0 K/UL — LOW (ref 25–125)
PCO2 BLDV: 54 MMHG — HIGH (ref 41–51)
PH BLDV: 7.28 PH — LOW (ref 7.32–7.43)
PH UR: 7 — SIGNIFICANT CHANGE UP (ref 5–8)
PLATELET # BLD AUTO: 320 K/UL — SIGNIFICANT CHANGE UP (ref 150–400)
PMV BLD: 10.2 FL — SIGNIFICANT CHANGE UP (ref 7–13)
PO2 BLDV: 24 MMHG — LOW (ref 35–40)
POTASSIUM BLDV-SCNC: 3.8 MMOL/L — SIGNIFICANT CHANGE UP (ref 3.4–4.5)
POTASSIUM SERPL-MCNC: 4 MMOL/L — SIGNIFICANT CHANGE UP (ref 3.5–5.3)
POTASSIUM SERPL-SCNC: 4 MMOL/L — SIGNIFICANT CHANGE UP (ref 3.5–5.3)
PROT SERPL-MCNC: 7.5 G/DL — SIGNIFICANT CHANGE UP (ref 6–8.3)
PROT UR-MCNC: SIGNIFICANT CHANGE UP
RBC # BLD: 4.17 M/UL — SIGNIFICANT CHANGE UP (ref 3.8–5.2)
RBC # FLD: 13.1 % — SIGNIFICANT CHANGE UP (ref 10.3–14.5)
SAO2 % BLDV: 37.8 % — LOW (ref 60–85)
SODIUM SERPL-SCNC: 141 MMOL/L — SIGNIFICANT CHANGE UP (ref 135–145)
SP GR SPEC: > 1.04 — HIGH (ref 1–1.04)
SQUAMOUS # UR AUTO: SIGNIFICANT CHANGE UP
UROBILINOGEN FLD QL: NORMAL — SIGNIFICANT CHANGE UP
WBC # BLD: 9.42 K/UL — SIGNIFICANT CHANGE UP (ref 3.8–10.5)
WBC # FLD AUTO: 9.42 K/UL — SIGNIFICANT CHANGE UP (ref 3.8–10.5)

## 2019-03-19 PROCEDURE — 74177 CT ABD & PELVIS W/CONTRAST: CPT | Mod: 26

## 2019-03-19 PROCEDURE — 99284 EMERGENCY DEPT VISIT MOD MDM: CPT | Mod: GC,25

## 2019-03-19 RX ORDER — CLONAZEPAM 1 MG
1 TABLET ORAL ONCE
Qty: 0 | Refills: 0 | Status: DISCONTINUED | OUTPATIENT
Start: 2019-03-19 | End: 2019-03-19

## 2019-03-19 NOTE — PROVIDER CONTACT NOTE (OTHER) - ASSESSMENT
pt stable to be discharged to group home, [facility].  staff to transport the patient home.  staff on sight and coming to room to  patient.

## 2019-03-19 NOTE — ED ADULT NURSE NOTE - OBJECTIVE STATEMENT
Pt received to room 18. Pt brought in from [facility] for right upper quadrant abd pain. Pt has hx of MR & bipolar, pt unconscionable & crying. Pt is poor historian, unable to tell a consistent story regarding her pain & when it developed. Pt states pain "came out of no where." Abd soft, non-distended. Lung sounds clear, heart sounds normal. Pt states having diarrhea, denies nausea & vomiting.  Pt unable to give any other medical hx. Aide from group home at bedside. Labs drawn & sent.

## 2019-03-19 NOTE — ED PROVIDER NOTE - CLINICAL SUMMARY MEDICAL DECISION MAKING FREE TEXT BOX
39 yo intellectually disabled F presenting from Group Home w/ abdominal pain. Tearful during exam. Minimally tender in the RUQ. No other complaints. Will obtain cbc, cmp, vbg, lipase, hcg, and CT abdomen/pelvis. Reassess.

## 2019-03-19 NOTE — ED ADULT NURSE NOTE - NSIMPLEMENTINTERV_GEN_ALL_ED
Implemented All Universal Safety Interventions:  [place] to call system. Call bell, personal items and telephone within reach. Instruct patient to call for assistance. Room bathroom lighting operational. Non-slip footwear when patient is off stretcher. Physically safe environment: no spills, clutter or unnecessary equipment. Stretcher in lowest position, wheels locked, appropriate side rails in place.

## 2019-03-19 NOTE — ED PROVIDER NOTE - OBJECTIVE STATEMENT
39 yo F, accompanied by aide, presenting from [facility], w/ PMH of intellectual disability, Bipolar, hypothyroid BIBA d/t abdominal pain which began last night. Patient does not recall what she was doing when the pain started but does not think she was eating, and does not think anything hit her abdomen. Patient endorses diarrhea, but denies n/v, or urinary symptoms. Patient denies other complaints. Patient tearful during patient interview, and does not answer any other questions about the pain.

## 2019-03-19 NOTE — ED PROVIDER NOTE - ATTENDING CONTRIBUTION TO CARE
pt from group home with RUQ abd pain - pain is reported as severe and pt is tearful but history is severely limited by mental capacity.    Gen: Well appearing and tearful touching abdomen  Head: NC/AT  Neck: trachea midline  Resp:  No distress  Abd: soft TTP in the RUQ   Ext: no deformities  Neuro:  Alert appears non focal  Skin:  Warm and dry as visualized    pt with RUQ pain - need to consider GB pathology as most likely however pt will likely not tolerate an US so will opt for a CT scan of the abdomen.  Labs as well.  Will likely require anxiolysis for CT scan.

## 2019-03-19 NOTE — ED ADULT TRIAGE NOTE - CHIEF COMPLAINT QUOTE
awake crying in triage   c/o abd pain ( states she fell on it) no injury visible     hx bipolar  intellectual disability

## 2019-03-19 NOTE — ED PROVIDER NOTE - NSFOLLOWUPINSTRUCTIONS_ED_ALL_ED_FT
Follow-up with your Primary Care Physician within 24-48 hours.  Please return to the Emergency Department immediately for any new, worsening or concerning symptoms.  Copy of your lab work, imaging and/or other testing performed in the ER is attached along with your discharge paperwork.  Please take this to your Primary Care Physician for further discussion, evaluation and comparison with your prior blood work results.

## 2019-05-14 ENCOUNTER — APPOINTMENT (OUTPATIENT)
Dept: ENDOCRINOLOGY | Facility: CLINIC | Age: 39
End: 2019-05-14
Payer: MEDICARE

## 2019-05-14 VITALS
HEIGHT: 65 IN | SYSTOLIC BLOOD PRESSURE: 100 MMHG | BODY MASS INDEX: 43.82 KG/M2 | DIASTOLIC BLOOD PRESSURE: 62 MMHG | OXYGEN SATURATION: 97 % | WEIGHT: 263 LBS | HEART RATE: 73 BPM

## 2019-05-14 DIAGNOSIS — Z00.00 ENCOUNTER FOR GENERAL ADULT MEDICAL EXAMINATION W/OUT ABNORMAL FINDINGS: ICD-10-CM

## 2019-05-14 PROCEDURE — 99214 OFFICE O/P EST MOD 30 MIN: CPT

## 2019-05-15 LAB
PROLACTIN SERPL-MCNC: 11.8 NG/ML
T4 FREE SERPL-MCNC: 1.3 NG/DL
TSH SERPL-ACNC: 1.22 UIU/ML

## 2019-05-15 NOTE — REVIEW OF SYSTEMS
[Decreased Appetite] : appetite not decreased [Fatigue] : no fatigue [Visual Field Defect] : no visual field defect [Recent Weight Loss (___ Lbs)] : no recent weight loss [Dysphagia] : no dysphagia [Blurry Vision] : no blurred vision [Chest Pain] : no chest pain [Dysphonia] : no dysphonia [Palpitations] : no palpitations [Shortness Of Breath] : no shortness of breath [Nausea] : no nausea [Wheezing] : no wheezing was heard [Polyuria] : no polyuria [Vomiting] : no vomiting was observed [Irregular Menses] : regular menses [Dysuria] : no dysuria [Incontinence] : no incontinence [Joint Pain] : no joint pain [Muscle Weakness] : no muscle weakness [Joint Stiffness] : no joint stiffness [Muscle Cramps] : no muscle cramps [Acanthosis] : no acanthosis  [Hirsutism] : no hirsutism [Hair Loss] : no hair loss [Tremors] : no tremors [Headache] : no headaches [Depression] : no depression [Polydipsia] : no polydipsia [Anxiety] : no anxiety [Cold Intolerance] : cold tolerant [Galactorrhea] : no galactorrhea  [Heat Intolerance] : heat tolerant [Easy Bleeding] : no ~M tendency for easy bleeding [Swelling] : no swelling [Easy Bruising] : no tendency for easy bruising

## 2019-05-15 NOTE — HISTORY OF PRESENT ILLNESS
[FreeTextEntry1] : Ms. Gu is a 37 year old female with long term history of bilpolar disease, hypothyroidism who lives in a group home presented today for evaluation of hyperprolactenemia along with hypothyroidism. \par \par Previous HPI:\par ------------------------------------------------------------------------------------------------------------------------------------------\par As per the health aid, patient's last period was in 10/2017 and recently her Ob/gyn found her to have an elevated prolactin level to 101. A subsequent pituitary MRI was negative for any adenoma. She has been managed on benztropine, seroquel and Invega as her anti-psychotics. Otherwise, denied any galatorrhea, nausea or dizziness. Patient was overall a poor historian. \par She also has a long term history of being on levothyroxine for hypothyroidism with her most current dose being 100 mcg. Her last TSH checked on 01/12 on that dose was 9.02. Patient denied any weight gain, hair loss or excessive fatigue. \par -----------------------------------------------------------------------------------------------------------------------------------------------------\par In the interim, \par Invega was decreased to 6 mg daily. Otherwise, seroquel was discontinued. Dr. Meek and I had personally spoken and consensus on lowering the psych meds was made. Patient saw psychiatry in 03/2018, when the dosing was changed. \par First period returned on 03/31 and then she is currently getting her period. No nausea or dizziness. No discharge from her breast. Levothyroxine dose increased last time to 125 mcg daily. \par \par \par -------------------------------------------------------------------------\par Change in medication: \par Recent change in lithium 300 mg BID and seroquel 25 mg BID\par \par Symptoms: \par No breast discharge \par Normal periods  ( LMP: 05/02) \par She lost 7 lbs \par \par \par \par

## 2019-05-15 NOTE — PHYSICAL EXAM
[Alert] : alert [No Acute Distress] : no acute distress [Well Developed] : well developed [Well Nourished] : well nourished [PERRL] : pupils equal, round and reactive to light [Normal Sclera/Conjunctiva] : normal sclera/conjunctiva [No Proptosis] : no proptosis [Normal Outer Ear/Nose] : the ears and nose were normal in appearance [EOMI] : extra ocular movement intact [Supple] : the neck was supple [No Neck Mass] : no neck mass was observed [No Respiratory Distress] : no respiratory distress [Normal Rate and Effort] : normal respiratory rhythm and effort [Normal S1, S2] : normal S1 and S2 [Clear to Auscultation] : lungs were clear to auscultation bilaterally [Normal Rate] : heart rate was normal  [Regular Rhythm] : with a regular rhythm [Normal Bowel Sounds] : normal bowel sounds [Not Distended] : not distended [Not Tender] : non-tender [Soft] : abdomen soft [Normal] : normal and non tender [Normal Gait] : normal gait [No Joint Swelling] : no joint swelling seen [No Clubbing, Cyanosis] : no clubbing  or cyanosis of the fingernails [Normal Strength/Tone] : muscle strength and tone were normal [No Rash] : no rash [No Skin Lesions] : no skin lesions [Normal Reflexes] : deep tendon reflexes were 2+ and symmetric [No Motor Deficits] : the motor exam was normal [No Tremors] : no tremors [Oriented x3] : oriented to person, place, and time [Normal Affect] : the affect was normal [Normal Insight/Judgement] : insight and judgment were intact [Normal Mood] : the mood was normal [Kyphosis] : no kyphosis present [Foot Ulcers] : no foot ulcers [Scoliosis] : scoliosis not present [Acne] : no acne

## 2019-05-15 NOTE — ASSESSMENT
[FreeTextEntry1] : 38 year old female with history of bipolar disease here for follow up of hyperprolactinemia. Overall it appears that that given her MRI is negative high likelihood this is secondary to Invega causing drug induced hyperprolactenemia. \par She is asymptomatic. \par Will be checking thyroid function along with prolactin levels today. \par \par -Check prolactin levels, TFTs\par -Continue Levothyroxine dosing at 137 mcg for now (1.5 tabs on sunday) \par -Will be called back if dosage adjustment is necessary\par \par \par Addendum:\par TFTS are normal along with prolactin\par -Will repeat bloodwork in 6 months and can follow up in one year

## 2019-05-26 ENCOUNTER — EMERGENCY (EMERGENCY)
Facility: HOSPITAL | Age: 39
LOS: 1 days | Discharge: HOME | End: 2019-05-26
Attending: EMERGENCY MEDICINE | Admitting: EMERGENCY MEDICINE
Payer: MEDICARE

## 2019-05-26 VITALS
TEMPERATURE: 98 F | HEART RATE: 71 BPM | OXYGEN SATURATION: 96 % | RESPIRATION RATE: 16 BRPM | SYSTOLIC BLOOD PRESSURE: 112 MMHG | DIASTOLIC BLOOD PRESSURE: 72 MMHG

## 2019-05-26 PROCEDURE — 93010 ELECTROCARDIOGRAM REPORT: CPT

## 2019-05-26 PROCEDURE — 99283 EMERGENCY DEPT VISIT LOW MDM: CPT | Mod: 25,GC

## 2019-05-26 NOTE — ED ADULT TRIAGE NOTE - CHIEF COMPLAINT QUOTE
Pt w/ hx of bipolar intellectual disability c/o cough productive of phlegm and subjective fevers x 2 weeks.  Pt is drowsy d/t multiple medications including lithium but aaox3. Pt accompanied by aide from [facility] w/ hx of bipolar intellectual disability c/o cough productive of phlegm and subjective fevers x 2 weeks.  Pt is drowsy d/t multiple medications including lithium but aaox3.

## 2019-05-27 VITALS
SYSTOLIC BLOOD PRESSURE: 122 MMHG | HEART RATE: 85 BPM | RESPIRATION RATE: 17 BRPM | DIASTOLIC BLOOD PRESSURE: 65 MMHG | OXYGEN SATURATION: 99 %

## 2019-05-27 PROCEDURE — 71046 X-RAY EXAM CHEST 2 VIEWS: CPT | Mod: 26

## 2019-05-27 RX ORDER — ACETAMINOPHEN 500 MG
650 TABLET ORAL ONCE
Refills: 0 | Status: COMPLETED | OUTPATIENT
Start: 2019-05-27 | End: 2019-05-27

## 2019-05-27 RX ORDER — IPRATROPIUM/ALBUTEROL SULFATE 18-103MCG
3 AEROSOL WITH ADAPTER (GRAM) INHALATION ONCE
Refills: 0 | Status: COMPLETED | OUTPATIENT
Start: 2019-05-27 | End: 2019-05-27

## 2019-05-27 RX ORDER — IBUPROFEN 200 MG
600 TABLET ORAL ONCE
Refills: 0 | Status: COMPLETED | OUTPATIENT
Start: 2019-05-27 | End: 2019-05-27

## 2019-05-27 RX ADMIN — Medication 600 MILLIGRAM(S): at 02:53

## 2019-05-27 RX ADMIN — Medication 100 MILLIGRAM(S): at 04:15

## 2019-05-27 RX ADMIN — Medication 650 MILLIGRAM(S): at 02:53

## 2019-05-27 RX ADMIN — Medication 3 MILLILITER(S): at 04:28

## 2019-05-27 NOTE — ED PROVIDER NOTE - NSFOLLOWUPINSTRUCTIONS_ED_ALL_ED_FT
You were seen at [facility] ER for chest pain and coughing. The pain was likely muscular in nature as you were very tender on exam. You had a normal chest xray and were given medication for the pain.     You are to take 600mg of ibuprofen with food and 650mg of Tylenol every 6 hours for 48 hours and then every 6 hours as needed for pain after this.     You are to follow-up with your primary care doctor in the next one week for further evaluation and management of your medical problems and for any further workup if the pain still persists.     You should return to the hospital if you begin to have persistent chest pain especially that radiates to the arm/jaw/back, shortness of breath or chest pain with exertion that is different than normal for you, new or worsening in any lower extremity edema (swelling), sudden onset of cold sweats with difficulty breathing, or for any other concerns.  Form more information on Heart Health please visit the American Heart Association's Website at: [email or web address]

## 2019-05-27 NOTE — ED PROVIDER NOTE - ATTENDING CONTRIBUTION TO CARE
I performed a face to face bedside interview with patient regarding history of present illness, review of symptoms and past medical history. I completed an independent physical exam.  I have discussed patient's plan of care.   I agree with note as stated above, having amended the EMR as needed to reflect my findings. I have discussed the assessment and plan of care.  This includes during the time I functioned as the attending physician for this patient.  Attending Contribution to Care: agree with plan of resident. pt p/w mid sternal cp after significant amount of coughing. non productive, no hemoptysis. with multiple antipsychotic medications, no diarrhea. states cp is reproducible, with movt, palpation. cxr with no pna. no radiation, no diaphorsis. no dm, htn, n/v/d. stable for d/c.

## 2019-05-27 NOTE — ED ADULT NURSE NOTE - CHIEF COMPLAINT QUOTE
Pt accompanied by aide from [facility] w/ hx of bipolar intellectual disability c/o cough productive of phlegm and subjective fevers x 2 weeks.  Pt is drowsy d/t multiple medications including lithium but aaox3.

## 2019-05-27 NOTE — PROVIDER CONTACT NOTE (OTHER) - SITUATION
Pt being discharged from the ER-returning to group home residence; group home unable to transport pt home due to staffing issues.

## 2019-05-27 NOTE — PROVIDER CONTACT NOTE (OTHER) - ASSESSMENT
VINNY contacted [facility] to arrange transport at [phone].  Transport requested with Josefina.

## 2019-05-27 NOTE — ED PROVIDER NOTE - CLINICAL SUMMARY MEDICAL DECISION MAKING FREE TEXT BOX
Pt is a 39 y/o F w/ a PMHx of Bipolar and intellectual delay who p/w chest pain liekly 2/2 costochondritis low suspicion for ACS or PE given history and physical and HEART Score 1, Wells 0. Will get EKG, CXR, given Ibuprofen and tylenol and discharge. Pt is a 37 y/o F w/ a PMHx of Bipolar and intellectual delay who p/w chest pain likely 2/2 costochondritis low suspicion for ACS or PE given history and physical and HEART Score 1, Wells 0. Will get EKG, CXR, given Ibuprofen and tylenol and discharge.

## 2019-05-27 NOTE — ED ADULT NURSE NOTE - OBJECTIVE STATEMENT
Patient rec'd from [facility] group home for chest pain exacerbated by cough and painful to touch. Denies sob, dizziness, weakness, n/v/d. Staff member at bedside, currently calm and cooperative.   Plan is meds, xray, reassess, will ctm.

## 2019-05-27 NOTE — ED PROVIDER NOTE - OBJECTIVE STATEMENT
Pt is a 39 y/o F w/ a PMHx of Bipolar and intellectual delay who p/w chest pain. Pt states she has been coughing for weeks and her chest pain is burning and is exacerbated by coughing. No radiation, no history of heart disease, has not taken any medication, no new headaches, slurred speech, no change in ambulation, no confusion per aid at bedside, no vomiting or abdominal pain, no sputum production, and no fevers.

## 2019-05-27 NOTE — ED ADULT NURSE NOTE - NSIMPLEMENTINTERV_GEN_ALL_ED
Implemented All Universal Safety Interventions:  [place] to call system. Call bell, personal items and telephone within reach. Instruct patient to call for assistance. Room bathroom lighting operational. Non-slip footwear when patient is off stretcher. Physically safe environment: no spills, clutter or unnecessary equipment. Stretcher in lowest position, wheels locked, appropriate side rails in place.

## 2019-05-27 NOTE — ED PROVIDER NOTE - NS ED ROS FT
ROS: Denies fevers, chills, Abdominal pain, rhinorrhea, new rashes, new LE edema, new visual changes, confusion, headaches, blood in stools, N/V, dysuria, and hematuria.

## 2019-05-27 NOTE — ED PROVIDER NOTE - PROGRESS NOTE DETAILS
patient with normal EKG and chest xray. Likely costochondritis. Will discharge with Ibuprofen and tylenol for pain and return instructions.   -Jared Medrano PGY4 EMIM [facility]#[id number]

## 2019-05-29 ENCOUNTER — EMERGENCY (EMERGENCY)
Facility: HOSPITAL | Age: 39
LOS: 1 days | Discharge: HOME | End: 2019-05-29
Attending: EMERGENCY MEDICINE | Admitting: EMERGENCY MEDICINE
Payer: MEDICARE

## 2019-05-29 VITALS
HEART RATE: 94 BPM | TEMPERATURE: 98 F | DIASTOLIC BLOOD PRESSURE: 61 MMHG | OXYGEN SATURATION: 100 % | RESPIRATION RATE: 16 BRPM | SYSTOLIC BLOOD PRESSURE: 102 MMHG

## 2019-05-29 PROCEDURE — 90792 PSYCH DIAG EVAL W/MED SRVCS: CPT

## 2019-05-29 PROCEDURE — 99283 EMERGENCY DEPT VISIT LOW MDM: CPT
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
## 2019-05-29 NOTE — ED BEHAVIORAL HEALTH NOTE - BEHAVIORAL HEALTH NOTE
Worker called patient’s residence [facility] and spoke to Jaydahugh Dodson ([phone]) medical coordinator for collateral information.     Ms. Dodson states that the patient was sent to the emergency room for protocol because she stated that she wanted to kill herself and she made a comment “just shoot me”. Ms. Dodson states that she has no access to guns and weapons because she lives in a residential house. Ms. Dodson states that the patient has been compliant with medications because the staff at the residence distributes it.  Ms. Dodson states that the patient was seen in the emergency room on Sunday because she was complaining about chest pain. Ms. Dodson states that the patient is diagnosed with mild intellectual disability.  Ms. Dodson states that the patient has stated passive SI in March 2019. She denies that the patient is violent or aggressive. The patient has no access to guns or weapons.     Worker also spoke to Pauline Groev ([phone])  for collateral information.     Ms. Grove states that the patient went to see her family in [facility]. She states that the patient has been complaining that she has chest pain and went to the hospital where it was advised that she be on a diet. Ms. Grove states that the patient was unhappy about this change and since then has been arguing about this. She states that today she sat with the nurse and told the nurse, “I want to kill myself, shoot me”. Ms. Grove states the patient has no access to weapons or guns. She states that when EMS showed up she told them she takes it back (the statement).  Worker informed that the patient is psychiatrically cleared for discharge. Ms. Grove was informed that the patient is cleared and she states that the group home staff will  patient upon discharge. Ms. Grove states that they are working on transferring the patient closer to her family in [place] but there are times where she states she likes living in [place].

## 2019-05-29 NOTE — ED ADULT NURSE REASSESSMENT NOTE - NS ED NURSE REASSESS COMMENT FT1
Patient in improved and stable condition, discharged as per MD Malik order, discharge instructions given, pt verbalized understanding and left ER a&ox3 with staff from adult residence.

## 2019-05-29 NOTE — ED ADULT NURSE NOTE - NSIMPLEMENTINTERV_GEN_ALL_ED
Implemented All Universal Safety Interventions:  [place] to call system. Call bell, personal items and telephone within reach. Instruct patient to call for assistance. Room bathroom lighting operational. Non-slip footwear when patient is off stretcher. Physically safe environment: no spills, clutter or unnecessary equipment. Stretcher in lowest position, wheels locked, appropriate side rails in place.

## 2019-05-29 NOTE — ED PROVIDER NOTE - PSYCHIATRIC, MLM
Alert and oriented to person, place, time/situation. dramatic crying mood and abnormal affect. +apparent risk to self, not to others.
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
## 2019-05-29 NOTE — ED BEHAVIORAL HEALTH ASSESSMENT NOTE - SAFETY PLAN DETAILS
The patient and family were advised to return to the Emergency Department or call 911 for any significant worsening of symptoms, and to restrict access to lethal means (pills, sharps, firearms, heights).  The patient and family agreed. The patient and staff were advised to return to the Emergency Department or call 911 for any significant worsening of symptoms, and to restrict access to lethal means (pills, sharps, firearms, heights).  The patient and staff agreed.
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
## 2019-05-29 NOTE — ED BEHAVIORAL HEALTH ASSESSMENT NOTE - DESCRIPTION
Patient is calm and cooperative, denying current suicidality or psychotic symptoms, but states that she does not want to be transferred to another residence (in [place]) obesity, hypothyroidism, glaucoma Lives in [facility] group home since Summer 2015

## 2019-05-29 NOTE — ED ADULT NURSE NOTE - CHIEF COMPLAINT QUOTE
Pt from [facility] sent with Suicidal thoughts threatening to kill her self with a gun.  pt has no access to rifle, but states, she was upset when she made those threats.  Pt is crying at triage

## 2019-05-29 NOTE — ED ADULT TRIAGE NOTE - CHIEF COMPLAINT QUOTE
Pt from [facility] sent with Suicidal thoughts threatening to kill her self with a gun.  pt has no access to rifle, but states, she was upset when she made those threats.  Pt is crying at triage

## 2019-05-29 NOTE — ED BEHAVIORAL HEALTH ASSESSMENT NOTE - RISK ASSESSMENT
Chronic risks include impulsivity, poor frustration tolerance, MR, past psychiatric admissions, and hx of violence and aggression. Protective factors include lack of current HI/SI, calm and cooperative in ER, supportive living situation, compliant with meds. Inpatient treatment is unlikely to mitigate these chronic risks factors to any significant degree. She does not meet criteria for involuntary hospitalization.
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
## 2019-05-29 NOTE — ED BEHAVIORAL HEALTH ASSESSMENT NOTE - SUICIDE PROTECTIVE FACTORS
Supportive social network or family/Positive therapeutic relationships/Identifies reasons for living/Future oriented

## 2019-06-15 ENCOUNTER — TRANSCRIPTION ENCOUNTER (OUTPATIENT)
Age: 39
End: 2019-06-15

## 2019-07-28 ENCOUNTER — EMERGENCY (EMERGENCY)
Facility: HOSPITAL | Age: 39
LOS: 1 days | Discharge: HOME | End: 2019-07-28
Admitting: EMERGENCY MEDICINE
Payer: MEDICARE

## 2019-07-28 VITALS
DIASTOLIC BLOOD PRESSURE: 86 MMHG | RESPIRATION RATE: 16 BRPM | TEMPERATURE: 98 F | OXYGEN SATURATION: 98 % | SYSTOLIC BLOOD PRESSURE: 120 MMHG | HEART RATE: 82 BPM

## 2019-07-28 PROCEDURE — 99283 EMERGENCY DEPT VISIT LOW MDM: CPT

## 2019-07-28 RX ORDER — DIPHENHYDRAMINE HCL 50 MG
50 CAPSULE ORAL ONCE
Refills: 0 | Status: COMPLETED | OUTPATIENT
Start: 2019-07-28 | End: 2019-07-28

## 2019-07-28 RX ORDER — CHLORPROMAZINE HCL 10 MG
50 TABLET ORAL ONCE
Refills: 0 | Status: COMPLETED | OUTPATIENT
Start: 2019-07-28 | End: 2019-07-28

## 2019-07-28 RX ADMIN — Medication 50 MILLIGRAM(S): at 18:44

## 2019-07-28 NOTE — ED PROVIDER NOTE - CLINICAL SUMMARY MEDICAL DECISION MAKING FREE TEXT BOX
40 y/o M  hx  Bipolar, Hypothyroid, MR   Medical evaluation performed. There is no clinical evidence of intoxication or any acute medical problem requiring immediate intervention. Tolerated medication well. D/C to group home in company of staff.

## 2019-07-28 NOTE — ED PROVIDER NOTE - NSFOLLOWUPCLINICS_GEN_ALL_ED_FT
[facility]  Behavioral Health  [address] [place] [address]  Phone: [phone]  Fax:   Follow Up Time:

## 2019-07-28 NOTE — ED ADULT NURSE NOTE - OBJECTIVE STATEMENT
pt to  alert,oriented x3 at present. pt noted with screaming,yelling,kicking at amb bay. pt medicated as ordered. pt noted with calmi behavior following. states I have bipolar and one of the staff members made me upset.  pt given dinner,will continue to monitor. no threatening behavior noted at present

## 2019-07-28 NOTE — ED ADULT TRIAGE NOTE - CHIEF COMPLAINT QUOTE
pt has hx of bipolar d/o coming from group home states " I wanna die... I wanna die.." states she does not want to live in the group home anymore and she just wants to go home. during triage pt wrapped bp cord around her neck. pt taken directly to . RN made aware.

## 2019-07-28 NOTE — ED ADULT NURSE NOTE - NSIMPLEMENTINTERV_GEN_ALL_ED
Implemented All Universal Safety Interventions:  [place] to call system. Call bell, personal items and telephone within reach. Instruct patient to call for assistance. Room bathroom lighting operational. Non-slip footwear when patient is off stretcher. Physically safe environment: no spills, clutter or unnecessary equipment. Stretcher in lowest position, wheels locked, appropriate side rails in place.

## 2019-07-28 NOTE — ED PROVIDER NOTE - OBJECTIVE STATEMENT
38 y/o M  hx  Bipolar, Hypothyroid, MR BIBA w c/o  aggression and acting out behaviour while at her group home . Staff states  that patient refused to take her medication today.  Patient presents screaming. No evidence of physical injuries, broken skin or deformities. Medication offered. Denies falling, punching or kicking nay objects.

## 2019-08-09 NOTE — ED PROVIDER NOTE - CPE EDP MUSC NORM
normal...

## 2019-08-22 ENCOUNTER — OTHER (OUTPATIENT)
Age: 39
End: 2019-08-22

## 2019-09-22 ENCOUNTER — EMERGENCY (EMERGENCY)
Facility: HOSPITAL | Age: 39
LOS: 1 days | Discharge: HOME | End: 2019-09-22
Attending: EMERGENCY MEDICINE | Admitting: INTERNAL MEDICINE
Payer: MEDICARE

## 2019-09-22 VITALS
RESPIRATION RATE: 16 BRPM | OXYGEN SATURATION: 100 % | SYSTOLIC BLOOD PRESSURE: 144 MMHG | TEMPERATURE: 98 F | DIASTOLIC BLOOD PRESSURE: 73 MMHG | HEART RATE: 87 BPM

## 2019-09-22 DIAGNOSIS — F60.3 BORDERLINE PERSONALITY DISORDER: ICD-10-CM

## 2019-09-22 PROCEDURE — 99285 EMERGENCY DEPT VISIT HI MDM: CPT

## 2019-09-22 PROCEDURE — 90792 PSYCH DIAG EVAL W/MED SRVCS: CPT | Mod: GC

## 2019-09-22 RX ORDER — DIPHENHYDRAMINE HCL 50 MG
50 CAPSULE ORAL ONCE
Refills: 0 | Status: COMPLETED | OUTPATIENT
Start: 2019-09-22 | End: 2019-09-22

## 2019-09-22 RX ORDER — CLONAZEPAM 1 MG
1 TABLET ORAL ONCE
Refills: 0 | Status: DISCONTINUED | OUTPATIENT
Start: 2019-09-22 | End: 2019-09-22

## 2019-09-22 RX ORDER — TOPIRAMATE 25 MG
200 TABLET ORAL ONCE
Refills: 0 | Status: COMPLETED | OUTPATIENT
Start: 2019-09-22 | End: 2019-09-22

## 2019-09-22 RX ORDER — LITHIUM CARBONATE 300 MG/1
300 TABLET, EXTENDED RELEASE ORAL ONCE
Refills: 0 | Status: COMPLETED | OUTPATIENT
Start: 2019-09-22 | End: 2019-09-22

## 2019-09-22 RX ORDER — DIPHENHYDRAMINE HCL 50 MG
50 CAPSULE ORAL ONCE
Refills: 0 | Status: DISCONTINUED | OUTPATIENT
Start: 2019-09-22 | End: 2019-09-22

## 2019-09-22 RX ORDER — DIPHENHYDRAMINE HCL 50 MG
50 CAPSULE ORAL EVERY 6 HOURS
Refills: 0 | Status: DISCONTINUED | OUTPATIENT
Start: 2019-09-22 | End: 2019-09-22

## 2019-09-22 RX ADMIN — Medication 50 MILLIGRAM(S): at 18:00

## 2019-09-22 RX ADMIN — Medication 2 MILLIGRAM(S): at 18:32

## 2019-09-22 RX ADMIN — Medication 1 MILLIGRAM(S): at 22:22

## 2019-09-22 RX ADMIN — Medication 200 MILLIGRAM(S): at 22:22

## 2019-09-22 RX ADMIN — Medication 2 MILLIGRAM(S): at 21:37

## 2019-09-22 RX ADMIN — LITHIUM CARBONATE 300 MILLIGRAM(S): 300 TABLET, EXTENDED RELEASE ORAL at 22:22

## 2019-09-22 NOTE — ED ADULT NURSE REASSESSMENT NOTE - NS ED NURSE REASSESS COMMENT FT1
Received report from RN LH pt silting in hallway eating & laughing pt recant Si statement denies hi/avh presently safety & comfort measures maintained eval on going.
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
## 2019-09-22 NOTE — ED BEHAVIORAL HEALTH ASSESSMENT NOTE - CASE SUMMARY
39 year-old female with history of impaired intellectual functioning, borderline personality disorder presenting with suicidal statements and catastrophic reaction following a social frustration. Once in the ED, following a meal, affect changed dramatically, pt smiling, insisting "I am not suicidal. I am not suicidal. Can I go?" Staff reports that pt's presentation is consistent with her baseline, was taken to ED "per protocol" but they do not have safety concerns. I do not consider her an acute risk to herself at this time. Plan is discharge back to [facility] with continued outpatient psychiatric follow-up. 39 year-old female with history of impaired intellectual functioning, borderline personality disorder presenting with suicidal statements and catastrophic reaction following a social frustration. Once in the ED, following a meal, affect changed dramatically, pt smiling, insisting "I am not suicidal. I am not suicidal. Can I go?" Staff reports that pt's presentation is consistent with her baseline, was taken to ED "per protocol" but they do not have safety concerns. I do not consider her an acute risk to herself at this time. Plan is discharge back to [facility] with continued outpatient psychiatric follow-up.    Addendum: Patient's chart was reviewed and hand off provided to me by the previous Attending. Pt became agitated this evening at 20:40pm after witnessing another patient in ED becoming agitated, and verbalizing that she wants to die. She was given Ativan 2mg IM , with fair response.  Pt will be kept overnight for reassessment in the morning. She will be given her night time medications as per above. 39 year-old female with history of impaired intellectual functioning, borderline personality disorder presenting with suicidal statements and catastrophic reaction following a social frustration. Once in the ED, following a meal, affect changed dramatically, pt smiling, insisting "I am not suicidal. I am not suicidal. Can I go?" Staff reports that pt's presentation is consistent with her baseline, was taken to ED "per protocol" but they do not have safety concerns. I do not consider her an acute risk to herself at this time. Plan is discharge back to [facility] with continued outpatient psychiatric follow-up.    Addendum: Patient's chart was reviewed and hand off provided to me by the previous Attending. Pt became agitated this evening at 20:40pm after witnessing another patient in ED becoming agitated, and verbalizing that she wants to die. She was given Ativan 2mg IM , with fair response.  Pt will be kept overnight for reassessment in the morning. She will be given her night time medications as per above.    Addendum : Patient was seen this morning at 5:00am , she remains calm for the rest of the night. She reports "I am ok", and denies any si at this time "I don't want to hurt myself", explaining that one of the staff could not take her for a walk yesterday and that upset her and she did not know how to deal with it, I just got angry". She agrees she will try to use alternative coping mechanisms and at this time feels safe to return to the residence. Pt is cleared for discharged. Discussed with ED attending Dr. Dubin. Transportation is being arranged.

## 2019-09-22 NOTE — ED PROVIDER NOTE - PROGRESS NOTE DETAILS
Patient reevaluated -fully evaluated by psych-recommending dc 1:1- reviewed prior history-mother passed many years ago-behavioral related-now adamantly denying si. Zvi Dubin, MD note: pt became significantly agitated, banging on walls & threatening staff - despite mult attempts by mult staff in redirection.  Chart reviewed.  Pt is bipolar, intelectual disability, here for SI & impulsivity, was initially seen & nearly cleared by , but now plan is to have the pt reassessed by psych in AM.  Her full labwork is pending.  She was given 2mg IM <3hrs ago, & is now quite animated.  Allergic to haldol.  Will give 4mg IM ativan now, & monitor pt w/freq VS in  area of Emergency Department to make sure she is not oversedated.  Will reassess. Patient was initially cleared by psychiatry for dc. Soon after, had episode of agitation, started grabbing staff equipment (id/stethoscope) and trying to wrap it around her neck stating she wants to die (did not successfully do so and sustained no physical harm. Code grey was called, patient was verbally deescalated and given im ativan by my verbal order at the time with good success. Patient now resting comfortably in her room. Psych attg aware of episode-recommending no dc at this time, monitor for the night and reass in the am. Standing regular meds ordered. Labs pending. Zvi Dubin, MD note: pt sleeping comfortably.  VSS.  labs reviewed & medically cleared.     doc saw pt, no need for psych admission.  Emergency Department VINNY Roach is attempting to contact group home now to arrange txpt. Zvi Dubin, MD note: pt sleeping comfortably.  VSS.  labs reviewed & medically cleared.     doc saw pt, no need for psych admission. Pt is already set up w/ outpt  provider.   Emergency Department VINNY Marley is attempting to contact group [place] now to arrange txpt.

## 2019-09-22 NOTE — ED ADULT NURSE NOTE - NSIMPLEMENTINTERV_GEN_ALL_ED
Implemented All Universal Safety Interventions:  [place] to call system. Call bell, personal items and telephone within reach. Instruct patient to call for assistance. Room bathroom lighting operational. Non-slip footwear when patient is off stretcher. Physically safe environment: no spills, clutter or unnecessary equipment. Stretcher in lowest position, wheels locked, appropriate side rails in place.

## 2019-09-22 NOTE — ED PROVIDER NOTE - NS ED ROS FT

## 2019-09-22 NOTE — ED BEHAVIORAL HEALTH ASSESSMENT NOTE - HPI (INCLUDE ILLNESS QUALITY, SEVERITY, DURATION, TIMING, CONTEXT, MODIFYING FACTORS, ASSOCIATED SIGNS AND SYMPTOMS)
Ms Gu is a 40yo  woman (domiciled at [facility], single, non-caregiver) with PPHx of Mild Intellectual Disability and Borderline PD vs Bipolar DO (multiple hospitalizations, multiple ED visits, hx of violent behavior as well as parasuicidal behavior, no substance abuse) and PMHx of hypothyroidism, glaucoma, obesity [facility] and suicidal behavior.    Joelle was extremely agitated on arrival to ED, screaming "I want to kill myself! I want to die!" loudly in am-bay. She received IM medication and quickly calmed, and was able to comfortably tolerate interview in . She explained that she got angry today because she was not invited to go for a walk with a staff member at the group home, which made her agitated and threaten suicide. She denied wanting to be dead per se, but stated that she hates her group home so much that she sometimes would rather be dead than stay there. She denied depressed mood or hopelessness ("I'm usually in a good mood, actually!"), denied anhedonia, denied psychotic or manic symptoms, denied substance use. She said her desperate feelings get worse when she thinks about her mother, who was her biggest support and  in . Joelle endorsed future-orientation towards pending changes in her housing, stating her family was trying to move her to a group home nearer to them in [place] and that this could be better. She endorsed compliance with all meds (lithium, clonidine, clonazepam, Topamax, Vrylar) and a recent follow-up with her outpatient psychiatrist that went well. No recent hospitalizations elsewhere.    Joelle was given food in the ED and calmed. Once calm, she retracted her suicidal statement and felt fine. See  note for collateral from group home staff, who stated that Joelle makes suicidal statements frequently when upset, and though she has done some dangerous things lately (running into traffic, namely) has not been overtly suicidal and is generally in good behavioral control at home. Ms Gu is a 38yo  woman (domiciled at [facility], single, non-caregiver) with PPHx of Mild Intellectual Disability and Borderline PD vs Bipolar DO (multiple hospitalizations, multiple ED visits, hx of violent behavior as well as parasuicidal behavior, no substance abuse) and PMHx of hypothyroidism, glaucoma, obesity [facility] after patient made suicidal threat following being left out of shopping trip with favorite staff member. According to staff, pt, who has reportedly expressed suicidal ideation frequently when she is frustrated, threatens to run into traffic or to choke herself, was doing well until earlier today. Staff reports that pt had been promised a shopping trip. She had expected to go with a male staff member, and found out at the last minute that she was not going with him, was going to be going with Tanesha (who was interviewed by this writer, LORENZO). At that time patient began screaming, insisting that she wanted to die. Staff reports that pt will frequently respond like this, but became apoplectic, screaming, and so 911 was called. In the ambulance on the way over pt appeared to be attempting to wrap a belt around her neck (although staff reports that this is a common attention seeking behavior on her part).     Joelle was extremely agitated on arrival to ED, screaming "I want to kill myself! I want to die!" loudly in am-bay. She was initially put on 1:1 but  she received IM medication and quickly calmed, and was able to comfortably tolerate interview in . She explained that she got angry today because she was not invited shipping a with a staff member at the group [facility], which made her agitated and threaten suicide. She denied wanting to be dead per se, but stated that she hates her group home so much that she sometimes would rather be dead than stay there. She denied depressed mood or hopelessness ("I'm usually in a good mood, actually!"), denied anhedonia, denied psychotic or manic symptoms, denied substance use. She said her desperate feelings get worse when she thinks about her mother, who was her biggest support and  in . Joelle endorsed future-orientation towards pending changes in her housing, stating her family was trying to move her to a group home nearer to them in [place] and that this could be better. She endorsed compliance with all meds (lithium, clonidine, clonazepam, Topamax, Vrylar) and a recent follow-up with her outpatient psychiatrist that went well. No recent hospitalizations elsewhere.    Joelle was given food in the ED and calmed. Once calm, she retracted her suicidal statement and felt fine. Her affect improved and she requested to return to group home.

## 2019-09-22 NOTE — ED ADULT NURSE NOTE - OBJECTIVE STATEMENT
Received pt in . Brought in from [facility] home after pt became agitated that she couldn't go out for a walk. Pt arrives agitated and emotional stating "I wanna die", "My mom  and I want to be with her". As per EMS pt attempted to use EMS stretcher belt to wrap around her neck en route. Also states "If you sent me back to where I live I will kill myself". Per psych and Dr. Mesa pt to be placed on C:O for safety. Pt denies HI. Medicated as ordered. Pt aid in waiting room at this time. Psych eval in progress. Will continue to monitor.
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
## 2019-09-22 NOTE — ED BEHAVIORAL HEALTH ASSESSMENT NOTE - ADDITIONAL DETAILS ALL
see HPI see HPI- history of threats to run into traffic, elope from facility, staff reports none in the past month

## 2019-09-22 NOTE — ED BEHAVIORAL HEALTH ASSESSMENT NOTE - DESCRIPTION
Received Ativan/Beandryl IM on arrival, then calmed to baseline    Vital Signs - Last 24 Hrs    T(C): 36.7 (09-22-19 @ 17:38), Max: 36.7 (09-22-19 @ 17:38)  HR: 87 (09-22-19 @ 17:38) (87 - 87)  BP: 144/73 (09-22-19 @ 17:38) (144/73 - 144/73)  RR: 16 (09-22-19 @ 17:38) (16 - 16)  SpO2: 100% (09-22-19 @ 17:38) (100% - 100%)  Wt(kg): --  Daily     Daily     I&O's Summary obesity, hypothyroidism, glaucoma Lives in [facility] group home since Summer 2015 Received Ativan/Beandryl IM on arrival, then calmed to baseline, briefly on 1:1 after she came into the ED screaming "I want to die!"     Vital Signs - Last 24 Hrs    T(C): 36.7 (09-22-19 @ 17:38), Max: 36.7 (09-22-19 @ 17:38)  HR: 87 (09-22-19 @ 17:38) (87 - 87)  BP: 144/73 (09-22-19 @ 17:38) (144/73 - 144/73)  RR: 16 (09-22-19 @ 17:38) (16 - 16)  SpO2: 100% (09-22-19 @ 17:38) (100% - 100%)  Wt(kg): --  Daily     Daily     I&O's Summary

## 2019-09-22 NOTE — ED BEHAVIORAL HEALTH ASSESSMENT NOTE - SUICIDE PROTECTIVE FACTORS
Supportive social network of family or friends/Positive therapeutic relationships/Identifies reasons for living/Has future plans

## 2019-09-22 NOTE — ED PROVIDER NOTE - PATIENT PORTAL LINK FT
You can access the FollowMyHealth Patient Portal offered by [facility] by registering at the following website: [email or web address]. By joining [employer]’s FollowMyHealth portal, you will also be able to view your health information using other applications (apps) compatible with our system.

## 2019-09-22 NOTE — ED PROVIDER NOTE - PHYSICAL EXAMINATION
GEN - intermittently screaming I want to die, crying. Follows commands after constant redirection  HEAD - NC/AT   EYES- PERRL, EOMI  ENT: Airway patent, mmm, Oral cavity and pharynx normal.   NECK: Neck supple  PULMONARY - CTA b/l, symmetric breath sounds.   CARDIAC -s1s2, RRR, no M,G,R  ABDOMEN - +BS, ND, NT, soft  BACK - no CVA tenderness, Normal  spine   EXTREMITIES - FROM  SKIN - no rash or bruising   NEUROLOGIC - alert, speech clear, no focal deficits  PSYCH -depressed, labile mood, +si
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
## 2019-09-22 NOTE — ED BEHAVIORAL HEALTH ASSESSMENT NOTE - NS ED BHA PLAN HOLD IN ED REASON2 FT
pt became agitated in response other agitated patient in ED, and attempted to elope and grab lanyards from the staff in attempt to choke herself, stating "I want to die". She was given Ativan 2 mg IM , given agitation plan was chnaged to keep pt in ED for further assessment in am , plan was communicated to group home.,

## 2019-09-23 ENCOUNTER — INPATIENT (INPATIENT)
Facility: HOSPITAL | Age: 39
LOS: 9 days | Discharge: HOME | End: 2019-10-03
Attending: PSYCHIATRY & NEUROLOGY | Admitting: PSYCHIATRY & NEUROLOGY
Payer: MEDICARE

## 2019-09-23 VITALS
DIASTOLIC BLOOD PRESSURE: 74 MMHG | SYSTOLIC BLOOD PRESSURE: 112 MMHG | OXYGEN SATURATION: 100 % | HEART RATE: 77 BPM | RESPIRATION RATE: 15 BRPM | TEMPERATURE: 98 F

## 2019-09-23 VITALS
SYSTOLIC BLOOD PRESSURE: 103 MMHG | DIASTOLIC BLOOD PRESSURE: 54 MMHG | TEMPERATURE: 99 F | HEART RATE: 100 BPM | RESPIRATION RATE: 18 BRPM | OXYGEN SATURATION: 100 %

## 2019-09-23 DIAGNOSIS — F31.9 BIPOLAR DISORDER, UNSPECIFIED: ICD-10-CM

## 2019-09-23 LAB
ALBUMIN SERPL ELPH-MCNC: 3.6 G/DL — SIGNIFICANT CHANGE UP (ref 3.3–5)
ALBUMIN SERPL ELPH-MCNC: 3.8 G/DL — SIGNIFICANT CHANGE UP (ref 3.3–5)
ALP SERPL-CCNC: 57 U/L — SIGNIFICANT CHANGE UP (ref 40–120)
ALP SERPL-CCNC: 60 U/L — SIGNIFICANT CHANGE UP (ref 40–120)
ALT FLD-CCNC: 17 U/L — SIGNIFICANT CHANGE UP (ref 4–33)
ALT FLD-CCNC: 18 U/L — SIGNIFICANT CHANGE UP (ref 4–33)
AMPHET UR-MCNC: NEGATIVE — SIGNIFICANT CHANGE UP
ANION GAP SERPL CALC-SCNC: 11 MMO/L — SIGNIFICANT CHANGE UP (ref 7–14)
ANION GAP SERPL CALC-SCNC: 15 MMO/L — HIGH (ref 7–14)
APAP SERPL-MCNC: < 15 UG/ML — LOW (ref 15–25)
APAP SERPL-MCNC: < 15 UG/ML — LOW (ref 15–25)
APPEARANCE UR: CLEAR — SIGNIFICANT CHANGE UP
AST SERPL-CCNC: 18 U/L — SIGNIFICANT CHANGE UP (ref 4–32)
AST SERPL-CCNC: 22 U/L — SIGNIFICANT CHANGE UP (ref 4–32)
BACTERIA # UR AUTO: NEGATIVE — SIGNIFICANT CHANGE UP
BARBITURATES UR SCN-MCNC: NEGATIVE — SIGNIFICANT CHANGE UP
BASOPHILS # BLD AUTO: 0.07 K/UL — SIGNIFICANT CHANGE UP (ref 0–0.2)
BASOPHILS # BLD AUTO: 0.08 K/UL — SIGNIFICANT CHANGE UP (ref 0–0.2)
BASOPHILS NFR BLD AUTO: 0.7 % — SIGNIFICANT CHANGE UP (ref 0–2)
BASOPHILS NFR BLD AUTO: 0.7 % — SIGNIFICANT CHANGE UP (ref 0–2)
BENZODIAZ UR-MCNC: NEGATIVE — SIGNIFICANT CHANGE UP
BILIRUB SERPL-MCNC: < 0.2 MG/DL — LOW (ref 0.2–1.2)
BILIRUB SERPL-MCNC: < 0.2 MG/DL — LOW (ref 0.2–1.2)
BILIRUB UR-MCNC: NEGATIVE — SIGNIFICANT CHANGE UP
BLOOD UR QL VISUAL: SIGNIFICANT CHANGE UP
BUN SERPL-MCNC: 14 MG/DL — SIGNIFICANT CHANGE UP (ref 7–23)
BUN SERPL-MCNC: 18 MG/DL — SIGNIFICANT CHANGE UP (ref 7–23)
CALCIUM SERPL-MCNC: 9.1 MG/DL — SIGNIFICANT CHANGE UP (ref 8.4–10.5)
CALCIUM SERPL-MCNC: 9.2 MG/DL — SIGNIFICANT CHANGE UP (ref 8.4–10.5)
CANNABINOIDS UR-MCNC: NEGATIVE — SIGNIFICANT CHANGE UP
CHLORIDE SERPL-SCNC: 109 MMOL/L — HIGH (ref 98–107)
CHLORIDE SERPL-SCNC: 110 MMOL/L — HIGH (ref 98–107)
CO2 SERPL-SCNC: 19 MMOL/L — LOW (ref 22–31)
CO2 SERPL-SCNC: 22 MMOL/L — SIGNIFICANT CHANGE UP (ref 22–31)
COCAINE METAB.OTHER UR-MCNC: NEGATIVE — SIGNIFICANT CHANGE UP
COLOR SPEC: SIGNIFICANT CHANGE UP
CREAT SERPL-MCNC: 0.69 MG/DL — SIGNIFICANT CHANGE UP (ref 0.5–1.3)
CREAT SERPL-MCNC: 0.93 MG/DL — SIGNIFICANT CHANGE UP (ref 0.5–1.3)
EOSINOPHIL # BLD AUTO: 0.12 K/UL — SIGNIFICANT CHANGE UP (ref 0–0.5)
EOSINOPHIL # BLD AUTO: 0.24 K/UL — SIGNIFICANT CHANGE UP (ref 0–0.5)
EOSINOPHIL NFR BLD AUTO: 1.2 % — SIGNIFICANT CHANGE UP (ref 0–6)
EOSINOPHIL NFR BLD AUTO: 2.1 % — SIGNIFICANT CHANGE UP (ref 0–6)
ETHANOL BLD-MCNC: < 10 MG/DL — SIGNIFICANT CHANGE UP
ETHANOL BLD-MCNC: < 10 MG/DL — SIGNIFICANT CHANGE UP
GLUCOSE SERPL-MCNC: 114 MG/DL — HIGH (ref 70–99)
GLUCOSE SERPL-MCNC: 94 MG/DL — SIGNIFICANT CHANGE UP (ref 70–99)
GLUCOSE UR-MCNC: NEGATIVE — SIGNIFICANT CHANGE UP
HCG SERPL-ACNC: < 5 MIU/ML — SIGNIFICANT CHANGE UP
HCG SERPL-ACNC: < 5 MIU/ML — SIGNIFICANT CHANGE UP
HCT VFR BLD CALC: 37.6 % — SIGNIFICANT CHANGE UP (ref 34.5–45)
HCT VFR BLD CALC: 39.2 % — SIGNIFICANT CHANGE UP (ref 34.5–45)
HGB BLD-MCNC: 11.6 G/DL — SIGNIFICANT CHANGE UP (ref 11.5–15.5)
HGB BLD-MCNC: 12.3 G/DL — SIGNIFICANT CHANGE UP (ref 11.5–15.5)
HYALINE CASTS # UR AUTO: NEGATIVE — SIGNIFICANT CHANGE UP
IMM GRANULOCYTES NFR BLD AUTO: 0.4 % — SIGNIFICANT CHANGE UP (ref 0–1.5)
IMM GRANULOCYTES NFR BLD AUTO: 0.4 % — SIGNIFICANT CHANGE UP (ref 0–1.5)
KETONES UR-MCNC: NEGATIVE — SIGNIFICANT CHANGE UP
LEUKOCYTE ESTERASE UR-ACNC: NEGATIVE — SIGNIFICANT CHANGE UP
LITHIUM SERPL-MCNC: 0.56 MMOL/L — LOW (ref 0.6–1.2)
LYMPHOCYTES # BLD AUTO: 2.54 K/UL — SIGNIFICANT CHANGE UP (ref 1–3.3)
LYMPHOCYTES # BLD AUTO: 24.4 % — SIGNIFICANT CHANGE UP (ref 13–44)
LYMPHOCYTES # BLD AUTO: 26.9 % — SIGNIFICANT CHANGE UP (ref 13–44)
LYMPHOCYTES # BLD AUTO: 3.03 K/UL — SIGNIFICANT CHANGE UP (ref 1–3.3)
MCHC RBC-ENTMCNC: 30.9 % — LOW (ref 32–36)
MCHC RBC-ENTMCNC: 31.4 % — LOW (ref 32–36)
MCHC RBC-ENTMCNC: 31.5 PG — SIGNIFICANT CHANGE UP (ref 27–34)
MCHC RBC-ENTMCNC: 31.5 PG — SIGNIFICANT CHANGE UP (ref 27–34)
MCV RBC AUTO: 100.5 FL — HIGH (ref 80–100)
MCV RBC AUTO: 102.2 FL — HIGH (ref 80–100)
METHADONE UR-MCNC: NEGATIVE — SIGNIFICANT CHANGE UP
MONOCYTES # BLD AUTO: 0.87 K/UL — SIGNIFICANT CHANGE UP (ref 0–0.9)
MONOCYTES # BLD AUTO: 0.88 K/UL — SIGNIFICANT CHANGE UP (ref 0–0.9)
MONOCYTES NFR BLD AUTO: 7.7 % — SIGNIFICANT CHANGE UP (ref 2–14)
MONOCYTES NFR BLD AUTO: 8.5 % — SIGNIFICANT CHANGE UP (ref 2–14)
NEUTROPHILS # BLD AUTO: 6.74 K/UL — SIGNIFICANT CHANGE UP (ref 1.8–7.4)
NEUTROPHILS # BLD AUTO: 6.99 K/UL — SIGNIFICANT CHANGE UP (ref 1.8–7.4)
NEUTROPHILS NFR BLD AUTO: 62.2 % — SIGNIFICANT CHANGE UP (ref 43–77)
NEUTROPHILS NFR BLD AUTO: 64.8 % — SIGNIFICANT CHANGE UP (ref 43–77)
NITRITE UR-MCNC: NEGATIVE — SIGNIFICANT CHANGE UP
NRBC # FLD: 0 K/UL — SIGNIFICANT CHANGE UP (ref 0–0)
NRBC # FLD: 0 K/UL — SIGNIFICANT CHANGE UP (ref 0–0)
OPIATES UR-MCNC: NEGATIVE — SIGNIFICANT CHANGE UP
OXYCODONE UR-MCNC: NEGATIVE — SIGNIFICANT CHANGE UP
PCP UR-MCNC: NEGATIVE — SIGNIFICANT CHANGE UP
PH UR: 6.5 — SIGNIFICANT CHANGE UP (ref 5–8)
PLATELET # BLD AUTO: 290 K/UL — SIGNIFICANT CHANGE UP (ref 150–400)
PLATELET # BLD AUTO: 311 K/UL — SIGNIFICANT CHANGE UP (ref 150–400)
PMV BLD: 10.2 FL — SIGNIFICANT CHANGE UP (ref 7–13)
PMV BLD: 10.6 FL — SIGNIFICANT CHANGE UP (ref 7–13)
POTASSIUM SERPL-MCNC: 3.6 MMOL/L — SIGNIFICANT CHANGE UP (ref 3.5–5.3)
POTASSIUM SERPL-MCNC: 3.9 MMOL/L — SIGNIFICANT CHANGE UP (ref 3.5–5.3)
POTASSIUM SERPL-SCNC: 3.6 MMOL/L — SIGNIFICANT CHANGE UP (ref 3.5–5.3)
POTASSIUM SERPL-SCNC: 3.9 MMOL/L — SIGNIFICANT CHANGE UP (ref 3.5–5.3)
PROT SERPL-MCNC: 7.1 G/DL — SIGNIFICANT CHANGE UP (ref 6–8.3)
PROT SERPL-MCNC: 7.6 G/DL — SIGNIFICANT CHANGE UP (ref 6–8.3)
PROT UR-MCNC: 30 — SIGNIFICANT CHANGE UP
RBC # BLD: 3.68 M/UL — LOW (ref 3.8–5.2)
RBC # BLD: 3.9 M/UL — SIGNIFICANT CHANGE UP (ref 3.8–5.2)
RBC # FLD: 12.6 % — SIGNIFICANT CHANGE UP (ref 10.3–14.5)
RBC # FLD: 12.8 % — SIGNIFICANT CHANGE UP (ref 10.3–14.5)
RBC CASTS # UR COMP ASSIST: HIGH (ref 0–?)
SALICYLATES SERPL-MCNC: < 5 MG/DL — LOW (ref 15–30)
SALICYLATES SERPL-MCNC: < 5 MG/DL — LOW (ref 15–30)
SODIUM SERPL-SCNC: 143 MMOL/L — SIGNIFICANT CHANGE UP (ref 135–145)
SODIUM SERPL-SCNC: 143 MMOL/L — SIGNIFICANT CHANGE UP (ref 135–145)
SP GR SPEC: 1.01 — SIGNIFICANT CHANGE UP (ref 1–1.04)
SQUAMOUS # UR AUTO: SIGNIFICANT CHANGE UP
TSH SERPL-MCNC: 2.26 UIU/ML — SIGNIFICANT CHANGE UP (ref 0.27–4.2)
TSH SERPL-MCNC: 3.42 UIU/ML — SIGNIFICANT CHANGE UP (ref 0.27–4.2)
UROBILINOGEN FLD QL: NORMAL — SIGNIFICANT CHANGE UP
WBC # BLD: 10.39 K/UL — SIGNIFICANT CHANGE UP (ref 3.8–10.5)
WBC # BLD: 11.25 K/UL — HIGH (ref 3.8–10.5)
WBC # FLD AUTO: 10.39 K/UL — SIGNIFICANT CHANGE UP (ref 3.8–10.5)
WBC # FLD AUTO: 11.25 K/UL — HIGH (ref 3.8–10.5)
WBC UR QL: SIGNIFICANT CHANGE UP (ref 0–?)

## 2019-09-23 PROCEDURE — 99285 EMERGENCY DEPT VISIT HI MDM: CPT

## 2019-09-23 RX ORDER — CHOLECALCIFEROL (VITAMIN D3) 125 MCG
1000 CAPSULE ORAL DAILY
Refills: 0 | Status: DISCONTINUED | OUTPATIENT
Start: 2019-09-23 | End: 2019-10-03

## 2019-09-23 RX ORDER — CHLORPROMAZINE HCL 10 MG
50 TABLET ORAL EVERY 6 HOURS
Refills: 0 | Status: DISCONTINUED | OUTPATIENT
Start: 2019-09-23 | End: 2019-10-03

## 2019-09-23 RX ORDER — LEVOTHYROXINE SODIUM 125 MCG
75 TABLET ORAL DAILY
Refills: 0 | Status: DISCONTINUED | OUTPATIENT
Start: 2019-09-23 | End: 2019-10-03

## 2019-09-23 RX ORDER — CHLORPROMAZINE HCL 10 MG
50 TABLET ORAL ONCE
Refills: 0 | Status: COMPLETED | OUTPATIENT
Start: 2019-09-23 | End: 2019-09-23

## 2019-09-23 RX ORDER — SOD,AMMONIUM,POTASSIUM LACTATE
1 CREAM (GRAM) TOPICAL
Refills: 0 | Status: DISCONTINUED | OUTPATIENT
Start: 2019-09-23 | End: 2019-10-03

## 2019-09-23 RX ORDER — LATANOPROST 0.05 MG/ML
1 SOLUTION/ DROPS OPHTHALMIC; TOPICAL AT BEDTIME
Refills: 0 | Status: DISCONTINUED | OUTPATIENT
Start: 2019-09-23 | End: 2019-10-03

## 2019-09-23 RX ORDER — TOPIRAMATE 25 MG
200 TABLET ORAL
Refills: 0 | Status: DISCONTINUED | OUTPATIENT
Start: 2019-09-23 | End: 2019-10-03

## 2019-09-23 RX ORDER — BENZTROPINE MESYLATE 1 MG
1 TABLET ORAL
Refills: 0 | Status: DISCONTINUED | OUTPATIENT
Start: 2019-09-23 | End: 2019-10-03

## 2019-09-23 RX ORDER — CLONAZEPAM 1 MG
1 TABLET ORAL
Refills: 0 | Status: DISCONTINUED | OUTPATIENT
Start: 2019-09-23 | End: 2019-09-25

## 2019-09-23 RX ORDER — DIPHENHYDRAMINE HCL 50 MG
50 CAPSULE ORAL ONCE
Refills: 0 | Status: COMPLETED | OUTPATIENT
Start: 2019-09-23 | End: 2019-09-23

## 2019-09-23 RX ORDER — TRAZODONE HCL 50 MG
50 TABLET ORAL AT BEDTIME
Refills: 0 | Status: DISCONTINUED | OUTPATIENT
Start: 2019-09-23 | End: 2019-10-03

## 2019-09-23 RX ORDER — QUETIAPINE FUMARATE 200 MG/1
200 TABLET, FILM COATED ORAL AT BEDTIME
Refills: 0 | Status: DISCONTINUED | OUTPATIENT
Start: 2019-09-23 | End: 2019-10-03

## 2019-09-23 RX ORDER — LITHIUM CARBONATE 300 MG/1
450 TABLET, EXTENDED RELEASE ORAL
Refills: 0 | Status: DISCONTINUED | OUTPATIENT
Start: 2019-09-23 | End: 2019-10-03

## 2019-09-23 RX ORDER — CLONAZEPAM 1 MG
0.5 TABLET ORAL ONCE
Refills: 0 | Status: DISCONTINUED | OUTPATIENT
Start: 2019-09-23 | End: 2019-09-23

## 2019-09-23 RX ORDER — DOCUSATE SODIUM 100 MG
100 CAPSULE ORAL AT BEDTIME
Refills: 0 | Status: DISCONTINUED | OUTPATIENT
Start: 2019-09-23 | End: 2019-10-03

## 2019-09-23 RX ADMIN — Medication 75 MICROGRAM(S): at 21:31

## 2019-09-23 RX ADMIN — Medication 100 MILLIGRAM(S): at 21:31

## 2019-09-23 RX ADMIN — Medication 50 MILLIGRAM(S): at 13:58

## 2019-09-23 RX ADMIN — Medication 50 MILLIGRAM(S): at 21:31

## 2019-09-23 RX ADMIN — Medication 200 MILLIGRAM(S): at 21:31

## 2019-09-23 RX ADMIN — Medication 0.5 MILLIGRAM(S): at 08:57

## 2019-09-23 RX ADMIN — Medication 4 MILLIGRAM(S): at 01:10

## 2019-09-23 RX ADMIN — LITHIUM CARBONATE 450 MILLIGRAM(S): 300 TABLET, EXTENDED RELEASE ORAL at 21:31

## 2019-09-23 RX ADMIN — Medication 1 MILLIGRAM(S): at 21:13

## 2019-09-23 RX ADMIN — Medication 50 MILLIGRAM(S): at 12:45

## 2019-09-23 RX ADMIN — Medication 2 MILLIGRAM(S): at 16:23

## 2019-09-23 RX ADMIN — Medication 2 MILLIGRAM(S): at 13:58

## 2019-09-23 RX ADMIN — QUETIAPINE FUMARATE 200 MILLIGRAM(S): 200 TABLET, FILM COATED ORAL at 21:31

## 2019-09-23 RX ADMIN — Medication 50 MILLIGRAM(S): at 16:23

## 2019-09-23 NOTE — ED ADULT NURSE NOTE - NSIMPLEMENTINTERV_GEN_ALL_ED
Implemented All Universal Safety Interventions:  [place] to call system. Call bell, personal items and telephone within reach. Instruct patient to call for assistance. Room bathroom lighting operational. Non-slip footwear when patient is off stretcher. Physically safe environment: no spills, clutter or unnecessary equipment. Stretcher in lowest position, wheels locked, appropriate side rails in place.

## 2019-09-23 NOTE — ED ADULT TRIAGE NOTE - CHIEF COMPLAINT QUOTE
Patient arrives by EMS with staff member c/o worsening depression and agitation, pt combative at ambulance bay and taken directly in  with , pt recently discharged from ED, pt currently denies S/I, pt calm and cooperative in  area
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
## 2019-09-23 NOTE — ED BEHAVIORAL HEALTH ASSESSMENT NOTE - ADDITIONAL DETAILS ALL
see HPI- history of threats to run into traffic, elope from facility, staff reports none in the past month

## 2019-09-23 NOTE — ED BEHAVIORAL HEALTH NOTE - BEHAVIORAL HEALTH NOTE
Writer contacted [facility] for census bed.  No beds currently available.  Pt placed on waiting list.

## 2019-09-23 NOTE — ED BEHAVIORAL HEALTH ASSESSMENT NOTE - CURRENT MEDICATION
lithium ER 450mg BID, Cogentin 1 mg BID, Invega ER 9mg QHS, Colace 2 caps QHS, Synthroid 0.075mg daily, senna 8.6 mg QHS, trazodone 50mg QHS, lactulose 30ml QHS, omega fish oil 1000mg BID, Topamax 200mg BID, Seroquel 200mg QHS, Calcium +Vit D 400IU daily, Klonopin 1 mg BID, Latanoprost 0.005% eye drops each eye QHS, ammonium lactate 12% apply to feet QHS, Aspercreme lotion, vitamin D3 1000units daily, Econazole nit 1% creme BID, Miconazorb AF 2% powder BID, Erythromycin BID. Lithium ER 450mg BID, Cogentin 1 mg BID, Invega ER 9mg QHS, trazodone 50mg QHS, Topamax 200mg BID, Seroquel 200mg QHS, Klonopin 1 mg BID    Colace 2 caps QHS, Synthroid 0.075mg daily, senna 8.6 mg QHS, lactulose 30ml QHS, omega fish oil 1000mg BID, , Calcium +Vit D 400IU daily, , Latanoprost 0.005% eye drops each eye QHS, ammonium lactate 12% apply to feet QHS, Aspercreme lotion, vitamin D3 1000units daily, Econazole nit 1% creme BID, Miconazorb AF 2% powder BID, Erythromycin BID.
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
## 2019-09-23 NOTE — ED BEHAVIORAL HEALTH ASSESSMENT NOTE - SUICIDE PROTECTIVE FACTORS
Supportive social network of family or friends/Positive therapeutic relationships/Identifies reasons for living/Has future plans Responsibility to family and others/Supportive social network of family or friends
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
## 2019-09-23 NOTE — ED BEHAVIORAL HEALTH ASSESSMENT NOTE - ACCOMPANIED BY
Please advise   Patients last depo provera shot was given 02/26/19(1 left on standing order)  It has been 6 months since last depo shot was given.   Please advise Non-family member
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
## 2019-09-23 NOTE — ED BEHAVIORAL HEALTH NOTE - BEHAVIORAL HEALTH NOTE
Writer was informed patient is medically and psychiatrically cleared for discharge.  Writer called group [facility] [phone], number is continuously busy. Writer was informed patient is medically and psychiatrically cleared for discharge.  Writer called group [place] [phone], number is continuously busy.  Writer called [facility] Main office T: [phone], mailbox is full unable to leave message.  Writer called [phone] and reached medical coordinator "for another house" and doesn't have information regarding patient.  She requested writer call the house patient resides in .  Writer contacted Abeba  who states she will get to the house and arrange transportation.  Writer informed her writer will be calling back in 10 minutes to get an estimated time of arrival for group home staff.
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
## 2019-09-23 NOTE — ED BEHAVIORAL HEALTH NOTE - BEHAVIORAL HEALTH NOTE
Patient was agitated and restless over night in response to the acuity of the unit, was given Ativan 4 mg IM at 1am . This morning she is ok but is anxious about going back, she will get am dose of Klonopin 1mg, before leaving.

## 2019-09-23 NOTE — ED ADULT NURSE REASSESSMENT NOTE - NS ED NURSE REASSESS COMMENT FT1
pt dc'd, facility contacted and given rpt on pt's arrival, facility member in lobby to receive pt. Pt was medicated with po medication prior to discharge, no further complaints, remains calm, cooperative, and re-directable. Pt given dc papers and accompanied by pes to facility staff member.

## 2019-09-23 NOTE — ED BEHAVIORAL HEALTH NOTE - BEHAVIORAL HEALTH NOTE
Writer called group home staff member  again and left a voicemail requesting a callback to social work [facility].

## 2019-09-23 NOTE — ED BEHAVIORAL HEALTH ASSESSMENT NOTE - PROFESSIONAL COLLATERAL PHONE
interviewed in ED yesterday by MD who saw Pt on initial presentation (9/22/2019)/  - attempted to call but no answer; left VM

## 2019-09-23 NOTE — ED BEHAVIORAL HEALTH ASSESSMENT NOTE - DESCRIPTION
Upon arrival at the , the Pt was increasingly getting extremely agitated/violent.  she was not responding to multiple verbal redirection and was , Pt was once again medicated with Thorazine 50mg IM at 1216PM.  At the , the Pt still remained extremely agitated (screaming, banging on the doors and walls) and around 135PM, was once again given Thorazine 50mg IM + Ativan 2mg IM + Benadryl 50mg IM at 135PM. obesity, hypothyroidism, glaucoma Lives in [facility] group home since Summer 2015 Upon arrival at the , the Pt was increasingly getting extremely agitated/violent.  She was not responding to multiple verbal redirection.  Pt was screaming, banging on the doors and walls.  She was medicated with Thorazine 50mg IM + Ativan 2mg IM + Benadryl 50mg IM at 135PM.    Vital Signs Last 24 Hrs  T(C): 37.1 (23 Sep 2019 12:39), Max: 37.1 (23 Sep 2019 12:39)  T(F): 98.7 (23 Sep 2019 12:39), Max: 98.7 (23 Sep 2019 12:39)  HR: 100 (23 Sep 2019 12:39) (69 - 100)  BP: 103/54 (23 Sep 2019 12:39) (99/54 - 144/73)  BP(mean): --  RR: 18 (23 Sep 2019 12:39) (15 - 18)  SpO2: 100% (23 Sep 2019 12:39) (97% - 100%)    Pertinent labs reviewed
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
## 2019-09-23 NOTE — ED BEHAVIORAL HEALTH NOTE - BEHAVIORAL HEALTH NOTE
Writer contacted patient's group home staff member , call went to voicemail.  Lbr left a voicemail requesting a callback.

## 2019-09-23 NOTE — ED PROVIDER NOTE - PROGRESS NOTE DETAILS
Valarie NP- Pt became aggressive agitative, unable to reason and redirect pt, medication ordered, will reassess. Valarie NP- Pt started to act out again , unable to redirect. Psych advised another dose of meds, will order and adm, will reassess. Pt currently boarding waiting for placement.
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
## 2019-09-23 NOTE — ED BEHAVIORAL HEALTH ASSESSMENT NOTE - VIOLENCE RISK FACTORS:
Impulsivity/Irritability Community stressors that increase the risk of destabilization/Feeling of being under threat and being unable to control threat/Impulsivity/Irritability/Violent ideation/threat/speech/Affective dysregulation/Lack of insight into violence risk/need for treatment

## 2019-09-23 NOTE — ED BEHAVIORAL HEALTH NOTE - BEHAVIORAL HEALTH NOTE
VINNY informed by MD that pt is cleared for d/c from the Emergency Room.  SW attempted to contact [facility] at [phone], however message went straight to voice mail and a message could not be left due to mailbox being full.  SW reviewed past charts and attempted to call medical coordinator at [phone], however phone rang with no answer.  Message was left requesting a return phone call.  Also called [phone], however this number was not in service. VINNY informed by MD that pt is cleared for d/c from the Emergency Room.  SW attempted to contact [facility] at [phone], however message went straight to voice mail and a message could not be left due to mailbox being full.  VINNY reviewed past charts and attempted to call medical coordinator at [phone], however phone rang with no answer.  Message was left requesting a return phone call.  Also called [phone], however this number was not in service.    Addendum: VINNY notified by RN that a call back was received stating that the phone number to the residence is [phone].  SW called the number, however phone rang continuously and no one answered.  No voice mail available. SW informed by MD that pt is cleared for d/c from the Emergency Room.  SW attempted to contact [facility] at [phone], however message went straight to voice mail and a message could not be left due to mailbox being full.  SW reviewed past charts and attempted to call medical coordinator at [phone], however phone rang with no answer.  Message was left requesting a return phone call.  Also called [phone], however this number was not in service.    Addendum: VINNY notified by RN that a call back was received stating that the phone number to the residence is [phone].  SW called the number, however phone rang continuously and no one answered.  No voice mail available.    Addendum:  Attempted to contact residence again at above noted number, phone rang continuously and no option available to leave a voice mail.

## 2019-09-23 NOTE — ED BEHAVIORAL HEALTH ASSESSMENT NOTE - CASE SUMMARY
39 year-old female with history of impaired intellectual functioning, borderline personality disorder presenting with suicidal statements and catastrophic reaction following a social frustration. Once in the ED, following a meal, affect changed dramatically, pt smiling, insisting "I am not suicidal. I am not suicidal. Can I go?" Staff reports that pt's presentation is consistent with her baseline, was taken to ED "per protocol" but they do not have safety concerns. I do not consider her an acute risk to herself at this time. Plan is discharge back to [facility] with continued outpatient psychiatric follow-up.    Addendum: Patient's chart was reviewed and hand off provided to me by the previous Attending. Pt became agitated this evening at 20:40pm after witnessing another patient in ED becoming agitated, and verbalizing that she wants to die. She was given Ativan 2mg IM , with fair response.  Pt will be kept overnight for reassessment in the morning. She will be given her night time medications as per above.    Addendum : Patient was seen this morning at 5:00am , she remains calm for the rest of the night. She reports "I am ok", and denies any si at this time "I don't want to hurt myself", explaining that one of the staff could not take her for a walk yesterday and that upset her and she did not know how to deal with it, I just got angry". She agrees she will try to use alternative coping mechanisms and at this time feels safe to return to the residence. Pt is cleared for discharged. Discussed with ED attending Dr. Dubin. Transportation is being arranged.

## 2019-09-23 NOTE — ED ADULT NURSE REASSESSMENT NOTE - NS ED NURSE REASSESS COMMENT FT1
Pt a&ox3, sw attempting to [facility], no complaints at this time, re-directable, calm and cooperative at present.

## 2019-09-24 PROCEDURE — 99223 1ST HOSP IP/OBS HIGH 75: CPT

## 2019-09-24 RX ORDER — CHLORPROMAZINE HCL 10 MG
50 TABLET ORAL EVERY 6 HOURS
Refills: 0 | Status: DISCONTINUED | OUTPATIENT
Start: 2019-09-24 | End: 2019-10-03

## 2019-09-24 RX ADMIN — LATANOPROST 1 DROP(S): 0.05 SOLUTION/ DROPS OPHTHALMIC; TOPICAL at 20:12

## 2019-09-24 RX ADMIN — Medication 2 MILLIGRAM(S): at 16:00

## 2019-09-24 RX ADMIN — Medication 200 MILLIGRAM(S): at 20:12

## 2019-09-24 RX ADMIN — QUETIAPINE FUMARATE 200 MILLIGRAM(S): 200 TABLET, FILM COATED ORAL at 20:12

## 2019-09-24 RX ADMIN — Medication 1 MILLIGRAM(S): at 20:12

## 2019-09-24 RX ADMIN — Medication 50 MILLIGRAM(S): at 20:12

## 2019-09-24 RX ADMIN — Medication 50 MILLIGRAM(S): at 14:09

## 2019-09-24 RX ADMIN — LITHIUM CARBONATE 450 MILLIGRAM(S): 300 TABLET, EXTENDED RELEASE ORAL at 09:32

## 2019-09-24 RX ADMIN — LITHIUM CARBONATE 450 MILLIGRAM(S): 300 TABLET, EXTENDED RELEASE ORAL at 20:12

## 2019-09-24 RX ADMIN — Medication 75 MICROGRAM(S): at 09:32

## 2019-09-24 RX ADMIN — Medication 200 MILLIGRAM(S): at 09:32

## 2019-09-24 RX ADMIN — Medication 1 MILLIGRAM(S): at 09:32

## 2019-09-24 RX ADMIN — Medication 1000 UNIT(S): at 09:32

## 2019-09-24 RX ADMIN — Medication 100 MILLIGRAM(S): at 20:12

## 2019-09-25 PROCEDURE — 99231 SBSQ HOSP IP/OBS SF/LOW 25: CPT

## 2019-09-25 RX ORDER — CLONAZEPAM 1 MG
1 TABLET ORAL
Refills: 0 | Status: DISCONTINUED | OUTPATIENT
Start: 2019-09-25 | End: 2019-10-02

## 2019-09-25 RX ORDER — POLYETHYLENE GLYCOL 3350 17 G/17G
17 POWDER, FOR SOLUTION ORAL DAILY
Refills: 0 | Status: DISCONTINUED | OUTPATIENT
Start: 2019-09-25 | End: 2019-10-03

## 2019-09-25 RX ADMIN — LITHIUM CARBONATE 450 MILLIGRAM(S): 300 TABLET, EXTENDED RELEASE ORAL at 20:17

## 2019-09-25 RX ADMIN — Medication 50 MILLIGRAM(S): at 09:00

## 2019-09-25 RX ADMIN — Medication 1 MILLIGRAM(S): at 20:17

## 2019-09-25 RX ADMIN — Medication 50 MILLIGRAM(S): at 22:10

## 2019-09-25 RX ADMIN — POLYETHYLENE GLYCOL 3350 17 GRAM(S): 17 POWDER, FOR SOLUTION ORAL at 12:01

## 2019-09-25 RX ADMIN — QUETIAPINE FUMARATE 200 MILLIGRAM(S): 200 TABLET, FILM COATED ORAL at 20:17

## 2019-09-25 RX ADMIN — LATANOPROST 1 DROP(S): 0.05 SOLUTION/ DROPS OPHTHALMIC; TOPICAL at 20:17

## 2019-09-25 RX ADMIN — Medication 1000 UNIT(S): at 09:00

## 2019-09-25 RX ADMIN — Medication 50 MILLIGRAM(S): at 20:18

## 2019-09-25 RX ADMIN — Medication 1 MILLIGRAM(S): at 09:09

## 2019-09-25 RX ADMIN — Medication 200 MILLIGRAM(S): at 09:09

## 2019-09-25 RX ADMIN — Medication 75 MICROGRAM(S): at 09:09

## 2019-09-25 RX ADMIN — Medication 100 MILLIGRAM(S): at 20:17

## 2019-09-25 RX ADMIN — Medication 1 APPLICATION(S): at 20:17

## 2019-09-25 RX ADMIN — Medication 50 MILLIGRAM(S): at 16:07

## 2019-09-25 RX ADMIN — LITHIUM CARBONATE 450 MILLIGRAM(S): 300 TABLET, EXTENDED RELEASE ORAL at 09:09

## 2019-09-25 RX ADMIN — Medication 200 MILLIGRAM(S): at 20:18

## 2019-09-26 PROCEDURE — 99231 SBSQ HOSP IP/OBS SF/LOW 25: CPT

## 2019-09-26 PROCEDURE — 90853 GROUP PSYCHOTHERAPY: CPT

## 2019-09-26 RX ADMIN — POLYETHYLENE GLYCOL 3350 17 GRAM(S): 17 POWDER, FOR SOLUTION ORAL at 09:25

## 2019-09-26 RX ADMIN — Medication 50 MILLIGRAM(S): at 09:25

## 2019-09-26 RX ADMIN — LITHIUM CARBONATE 450 MILLIGRAM(S): 300 TABLET, EXTENDED RELEASE ORAL at 20:58

## 2019-09-26 RX ADMIN — Medication 75 MICROGRAM(S): at 09:25

## 2019-09-26 RX ADMIN — QUETIAPINE FUMARATE 200 MILLIGRAM(S): 200 TABLET, FILM COATED ORAL at 21:08

## 2019-09-26 RX ADMIN — LATANOPROST 1 DROP(S): 0.05 SOLUTION/ DROPS OPHTHALMIC; TOPICAL at 20:42

## 2019-09-26 RX ADMIN — Medication 1 MILLIGRAM(S): at 09:25

## 2019-09-26 RX ADMIN — Medication 1 MILLIGRAM(S): at 20:42

## 2019-09-26 RX ADMIN — Medication 1 MILLIGRAM(S): at 20:56

## 2019-09-26 RX ADMIN — Medication 200 MILLIGRAM(S): at 21:08

## 2019-09-26 RX ADMIN — Medication 50 MILLIGRAM(S): at 21:08

## 2019-09-26 RX ADMIN — Medication 50 MILLIGRAM(S): at 21:10

## 2019-09-26 RX ADMIN — LITHIUM CARBONATE 450 MILLIGRAM(S): 300 TABLET, EXTENDED RELEASE ORAL at 09:25

## 2019-09-26 RX ADMIN — Medication 1000 UNIT(S): at 09:25

## 2019-09-26 RX ADMIN — Medication 200 MILLIGRAM(S): at 09:25

## 2019-09-26 RX ADMIN — Medication 100 MILLIGRAM(S): at 20:42

## 2019-09-27 PROCEDURE — 99231 SBSQ HOSP IP/OBS SF/LOW 25: CPT

## 2019-09-27 RX ORDER — CHLORPROMAZINE HCL 10 MG
100 TABLET ORAL ONCE
Refills: 0 | Status: COMPLETED | OUTPATIENT
Start: 2019-09-27 | End: 2019-09-27

## 2019-09-27 RX ADMIN — Medication 1 MILLIGRAM(S): at 09:30

## 2019-09-27 RX ADMIN — Medication 50 MILLIGRAM(S): at 09:05

## 2019-09-27 RX ADMIN — LITHIUM CARBONATE 450 MILLIGRAM(S): 300 TABLET, EXTENDED RELEASE ORAL at 09:30

## 2019-09-27 RX ADMIN — Medication 75 MICROGRAM(S): at 09:30

## 2019-09-27 RX ADMIN — Medication 1000 UNIT(S): at 09:30

## 2019-09-27 RX ADMIN — Medication 100 MILLIGRAM(S): at 12:01

## 2019-09-27 RX ADMIN — Medication 50 MILLIGRAM(S): at 21:29

## 2019-09-27 RX ADMIN — Medication 50 MILLIGRAM(S): at 21:10

## 2019-09-27 RX ADMIN — POLYETHYLENE GLYCOL 3350 17 GRAM(S): 17 POWDER, FOR SOLUTION ORAL at 09:31

## 2019-09-27 RX ADMIN — QUETIAPINE FUMARATE 200 MILLIGRAM(S): 200 TABLET, FILM COATED ORAL at 21:10

## 2019-09-27 RX ADMIN — Medication 100 MILLIGRAM(S): at 21:10

## 2019-09-27 RX ADMIN — LITHIUM CARBONATE 450 MILLIGRAM(S): 300 TABLET, EXTENDED RELEASE ORAL at 21:10

## 2019-09-27 RX ADMIN — Medication 200 MILLIGRAM(S): at 21:10

## 2019-09-27 RX ADMIN — Medication 1 MILLIGRAM(S): at 21:10

## 2019-09-27 RX ADMIN — LATANOPROST 1 DROP(S): 0.05 SOLUTION/ DROPS OPHTHALMIC; TOPICAL at 21:10

## 2019-09-27 RX ADMIN — Medication 200 MILLIGRAM(S): at 09:31

## 2019-09-28 PROCEDURE — 99232 SBSQ HOSP IP/OBS MODERATE 35: CPT

## 2019-09-28 RX ORDER — ACETAMINOPHEN 500 MG
650 TABLET ORAL ONCE
Refills: 0 | Status: COMPLETED | OUTPATIENT
Start: 2019-09-28 | End: 2019-09-28

## 2019-09-28 RX ADMIN — Medication 50 MILLIGRAM(S): at 21:19

## 2019-09-28 RX ADMIN — LITHIUM CARBONATE 450 MILLIGRAM(S): 300 TABLET, EXTENDED RELEASE ORAL at 21:18

## 2019-09-28 RX ADMIN — QUETIAPINE FUMARATE 200 MILLIGRAM(S): 200 TABLET, FILM COATED ORAL at 21:19

## 2019-09-28 RX ADMIN — Medication 1000 UNIT(S): at 09:18

## 2019-09-28 RX ADMIN — Medication 1 MILLIGRAM(S): at 09:19

## 2019-09-28 RX ADMIN — Medication 50 MILLIGRAM(S): at 13:42

## 2019-09-28 RX ADMIN — LATANOPROST 1 DROP(S): 0.05 SOLUTION/ DROPS OPHTHALMIC; TOPICAL at 21:18

## 2019-09-28 RX ADMIN — Medication 1 MILLIGRAM(S): at 09:18

## 2019-09-28 RX ADMIN — Medication 100 MILLIGRAM(S): at 21:18

## 2019-09-28 RX ADMIN — Medication 200 MILLIGRAM(S): at 09:18

## 2019-09-28 RX ADMIN — Medication 75 MICROGRAM(S): at 09:19

## 2019-09-28 RX ADMIN — Medication 1 MILLIGRAM(S): at 21:18

## 2019-09-28 RX ADMIN — Medication 200 MILLIGRAM(S): at 21:19

## 2019-09-28 RX ADMIN — LITHIUM CARBONATE 450 MILLIGRAM(S): 300 TABLET, EXTENDED RELEASE ORAL at 09:18

## 2019-09-29 PROCEDURE — 99232 SBSQ HOSP IP/OBS MODERATE 35: CPT

## 2019-09-29 RX ORDER — CLONAZEPAM 1 MG
1 TABLET ORAL ONCE
Refills: 0 | Status: DISCONTINUED | OUTPATIENT
Start: 2019-09-29 | End: 2019-09-29

## 2019-09-29 RX ADMIN — Medication 75 MICROGRAM(S): at 09:30

## 2019-09-29 RX ADMIN — LITHIUM CARBONATE 450 MILLIGRAM(S): 300 TABLET, EXTENDED RELEASE ORAL at 09:30

## 2019-09-29 RX ADMIN — Medication 50 MILLIGRAM(S): at 22:05

## 2019-09-29 RX ADMIN — Medication 50 MILLIGRAM(S): at 16:00

## 2019-09-29 RX ADMIN — Medication 1 MILLIGRAM(S): at 21:32

## 2019-09-29 RX ADMIN — LATANOPROST 1 DROP(S): 0.05 SOLUTION/ DROPS OPHTHALMIC; TOPICAL at 21:33

## 2019-09-29 RX ADMIN — Medication 1 MILLIGRAM(S): at 09:30

## 2019-09-29 RX ADMIN — LITHIUM CARBONATE 450 MILLIGRAM(S): 300 TABLET, EXTENDED RELEASE ORAL at 21:33

## 2019-09-29 RX ADMIN — Medication 1 MILLIGRAM(S): at 21:33

## 2019-09-29 RX ADMIN — Medication 650 MILLIGRAM(S): at 03:42

## 2019-09-29 RX ADMIN — Medication 50 MILLIGRAM(S): at 21:33

## 2019-09-29 RX ADMIN — POLYETHYLENE GLYCOL 3350 17 GRAM(S): 17 POWDER, FOR SOLUTION ORAL at 09:30

## 2019-09-29 RX ADMIN — Medication 100 MILLIGRAM(S): at 21:33

## 2019-09-29 RX ADMIN — Medication 1000 UNIT(S): at 09:30

## 2019-09-29 RX ADMIN — QUETIAPINE FUMARATE 200 MILLIGRAM(S): 200 TABLET, FILM COATED ORAL at 21:33

## 2019-09-29 RX ADMIN — Medication 200 MILLIGRAM(S): at 21:33

## 2019-09-29 RX ADMIN — Medication 200 MILLIGRAM(S): at 09:30

## 2019-09-29 RX ADMIN — Medication 50 MILLIGRAM(S): at 09:37

## 2019-09-30 PROCEDURE — 99232 SBSQ HOSP IP/OBS MODERATE 35: CPT

## 2019-09-30 RX ADMIN — Medication 1 MILLIGRAM(S): at 20:30

## 2019-09-30 RX ADMIN — LITHIUM CARBONATE 450 MILLIGRAM(S): 300 TABLET, EXTENDED RELEASE ORAL at 20:30

## 2019-09-30 RX ADMIN — Medication 50 MILLIGRAM(S): at 20:59

## 2019-09-30 RX ADMIN — Medication 50 MILLIGRAM(S): at 11:50

## 2019-09-30 RX ADMIN — Medication 75 MICROGRAM(S): at 08:48

## 2019-09-30 RX ADMIN — Medication 1000 UNIT(S): at 08:48

## 2019-09-30 RX ADMIN — Medication 200 MILLIGRAM(S): at 08:48

## 2019-09-30 RX ADMIN — Medication 100 MILLIGRAM(S): at 20:30

## 2019-09-30 RX ADMIN — LITHIUM CARBONATE 450 MILLIGRAM(S): 300 TABLET, EXTENDED RELEASE ORAL at 08:48

## 2019-09-30 RX ADMIN — Medication 50 MILLIGRAM(S): at 20:30

## 2019-09-30 RX ADMIN — POLYETHYLENE GLYCOL 3350 17 GRAM(S): 17 POWDER, FOR SOLUTION ORAL at 08:48

## 2019-09-30 RX ADMIN — Medication 1 MILLIGRAM(S): at 08:48

## 2019-09-30 RX ADMIN — Medication 200 MILLIGRAM(S): at 20:30

## 2019-09-30 RX ADMIN — LATANOPROST 1 DROP(S): 0.05 SOLUTION/ DROPS OPHTHALMIC; TOPICAL at 20:30

## 2019-09-30 RX ADMIN — QUETIAPINE FUMARATE 200 MILLIGRAM(S): 200 TABLET, FILM COATED ORAL at 20:30

## 2019-10-01 PROCEDURE — 99232 SBSQ HOSP IP/OBS MODERATE 35: CPT

## 2019-10-01 RX ORDER — CHLORPROMAZINE HCL 10 MG
100 TABLET ORAL ONCE
Refills: 0 | Status: COMPLETED | OUTPATIENT
Start: 2019-10-01 | End: 2019-10-01

## 2019-10-01 RX ADMIN — Medication 75 MICROGRAM(S): at 09:12

## 2019-10-01 RX ADMIN — Medication 1 APPLICATION(S): at 21:18

## 2019-10-01 RX ADMIN — Medication 1000 UNIT(S): at 09:12

## 2019-10-01 RX ADMIN — Medication 50 MILLIGRAM(S): at 09:12

## 2019-10-01 RX ADMIN — QUETIAPINE FUMARATE 200 MILLIGRAM(S): 200 TABLET, FILM COATED ORAL at 21:19

## 2019-10-01 RX ADMIN — LITHIUM CARBONATE 450 MILLIGRAM(S): 300 TABLET, EXTENDED RELEASE ORAL at 21:18

## 2019-10-01 RX ADMIN — Medication 1 MILLIGRAM(S): at 21:18

## 2019-10-01 RX ADMIN — LITHIUM CARBONATE 450 MILLIGRAM(S): 300 TABLET, EXTENDED RELEASE ORAL at 09:12

## 2019-10-01 RX ADMIN — Medication 200 MILLIGRAM(S): at 21:19

## 2019-10-01 RX ADMIN — POLYETHYLENE GLYCOL 3350 17 GRAM(S): 17 POWDER, FOR SOLUTION ORAL at 09:12

## 2019-10-01 RX ADMIN — Medication 1 MILLIGRAM(S): at 09:12

## 2019-10-01 RX ADMIN — LATANOPROST 1 DROP(S): 0.05 SOLUTION/ DROPS OPHTHALMIC; TOPICAL at 21:18

## 2019-10-01 RX ADMIN — Medication 200 MILLIGRAM(S): at 09:12

## 2019-10-01 RX ADMIN — Medication 100 MILLIGRAM(S): at 21:18

## 2019-10-01 RX ADMIN — Medication 100 MILLIGRAM(S): at 17:57

## 2019-10-01 RX ADMIN — Medication 50 MILLIGRAM(S): at 21:19

## 2019-10-02 PROCEDURE — 99232 SBSQ HOSP IP/OBS MODERATE 35: CPT

## 2019-10-02 RX ORDER — CLONAZEPAM 1 MG
1 TABLET ORAL
Refills: 0 | Status: DISCONTINUED | OUTPATIENT
Start: 2019-10-02 | End: 2019-10-03

## 2019-10-02 RX ORDER — POLYETHYLENE GLYCOL 3350 17 G/17G
17 POWDER, FOR SOLUTION ORAL
Qty: 510 | Refills: 0
Start: 2019-10-02

## 2019-10-02 RX ORDER — CHOLECALCIFEROL (VITAMIN D3) 125 MCG
1000 CAPSULE ORAL
Qty: 0 | Refills: 0 | DISCHARGE
Start: 2019-10-02

## 2019-10-02 RX ORDER — ACETAMINOPHEN 500 MG
650 TABLET ORAL EVERY 6 HOURS
Refills: 0 | Status: DISCONTINUED | OUTPATIENT
Start: 2019-10-02 | End: 2019-10-03

## 2019-10-02 RX ORDER — SOD,AMMONIUM,POTASSIUM LACTATE
1 CREAM (GRAM) TOPICAL
Qty: 0 | Refills: 0 | DISCHARGE
Start: 2019-10-02

## 2019-10-02 RX ORDER — TRAZODONE HCL 50 MG
1 TABLET ORAL
Qty: 0 | Refills: 0 | DISCHARGE
Start: 2019-10-02

## 2019-10-02 RX ORDER — QUETIAPINE FUMARATE 200 MG/1
1 TABLET, FILM COATED ORAL
Qty: 0 | Refills: 0 | DISCHARGE
Start: 2019-10-02

## 2019-10-02 RX ORDER — LITHIUM CARBONATE 300 MG/1
1 TABLET, EXTENDED RELEASE ORAL
Qty: 60 | Refills: 0
Start: 2019-10-02

## 2019-10-02 RX ORDER — LEVOTHYROXINE SODIUM 125 MCG
1 TABLET ORAL
Qty: 30 | Refills: 0
Start: 2019-10-02

## 2019-10-02 RX ORDER — LEVOTHYROXINE SODIUM 125 MCG
1 TABLET ORAL
Qty: 0 | Refills: 0 | DISCHARGE
Start: 2019-10-02

## 2019-10-02 RX ORDER — LITHIUM CARBONATE 300 MG/1
1 TABLET, EXTENDED RELEASE ORAL
Qty: 0 | Refills: 0 | DISCHARGE
Start: 2019-10-02

## 2019-10-02 RX ORDER — TRAZODONE HCL 50 MG
1 TABLET ORAL
Qty: 30 | Refills: 0
Start: 2019-10-02

## 2019-10-02 RX ORDER — POLYETHYLENE GLYCOL 3350 17 G/17G
17 POWDER, FOR SOLUTION ORAL
Qty: 0 | Refills: 0 | DISCHARGE
Start: 2019-10-02

## 2019-10-02 RX ORDER — QUETIAPINE FUMARATE 200 MG/1
1 TABLET, FILM COATED ORAL
Qty: 30 | Refills: 0
Start: 2019-10-02

## 2019-10-02 RX ORDER — DOCUSATE SODIUM 100 MG
1 CAPSULE ORAL
Qty: 0 | Refills: 0 | DISCHARGE
Start: 2019-10-02

## 2019-10-02 RX ORDER — LATANOPROST 0.05 MG/ML
1 SOLUTION/ DROPS OPHTHALMIC; TOPICAL
Qty: 1 | Refills: 0
Start: 2019-10-02

## 2019-10-02 RX ORDER — TOPIRAMATE 25 MG
1 TABLET ORAL
Qty: 0 | Refills: 0 | DISCHARGE
Start: 2019-10-02

## 2019-10-02 RX ORDER — CHOLECALCIFEROL (VITAMIN D3) 125 MCG
1000 CAPSULE ORAL
Qty: 30 | Refills: 0
Start: 2019-10-02

## 2019-10-02 RX ORDER — BENZTROPINE MESYLATE 1 MG
1 TABLET ORAL
Qty: 0 | Refills: 0 | DISCHARGE
Start: 2019-10-02

## 2019-10-02 RX ORDER — TOPIRAMATE 25 MG
1 TABLET ORAL
Qty: 60 | Refills: 0
Start: 2019-10-02

## 2019-10-02 RX ORDER — SOD,AMMONIUM,POTASSIUM LACTATE
1 CREAM (GRAM) TOPICAL
Qty: 1 | Refills: 0
Start: 2019-10-02

## 2019-10-02 RX ORDER — DOCUSATE SODIUM 100 MG
1 CAPSULE ORAL
Qty: 30 | Refills: 0
Start: 2019-10-02

## 2019-10-02 RX ORDER — BENZTROPINE MESYLATE 1 MG
1 TABLET ORAL
Qty: 60 | Refills: 0
Start: 2019-10-02

## 2019-10-02 RX ORDER — CLONAZEPAM 1 MG
1 TABLET ORAL
Qty: 0 | Refills: 0 | DISCHARGE
Start: 2019-10-02

## 2019-10-02 RX ORDER — CLONAZEPAM 1 MG
1 TABLET ORAL
Qty: 30 | Refills: 0
Start: 2019-10-02

## 2019-10-02 RX ADMIN — Medication 100 MILLIGRAM(S): at 20:43

## 2019-10-02 RX ADMIN — Medication 1 APPLICATION(S): at 21:36

## 2019-10-02 RX ADMIN — Medication 650 MILLIGRAM(S): at 15:45

## 2019-10-02 RX ADMIN — Medication 50 MILLIGRAM(S): at 01:47

## 2019-10-02 RX ADMIN — Medication 1 MILLIGRAM(S): at 20:43

## 2019-10-02 RX ADMIN — LITHIUM CARBONATE 450 MILLIGRAM(S): 300 TABLET, EXTENDED RELEASE ORAL at 09:05

## 2019-10-02 RX ADMIN — Medication 50 MILLIGRAM(S): at 09:05

## 2019-10-02 RX ADMIN — Medication 50 MILLIGRAM(S): at 15:26

## 2019-10-02 RX ADMIN — LATANOPROST 1 DROP(S): 0.05 SOLUTION/ DROPS OPHTHALMIC; TOPICAL at 20:43

## 2019-10-02 RX ADMIN — Medication 650 MILLIGRAM(S): at 21:05

## 2019-10-02 RX ADMIN — Medication 200 MILLIGRAM(S): at 20:43

## 2019-10-02 RX ADMIN — Medication 50 MILLIGRAM(S): at 20:43

## 2019-10-02 RX ADMIN — QUETIAPINE FUMARATE 200 MILLIGRAM(S): 200 TABLET, FILM COATED ORAL at 20:43

## 2019-10-02 RX ADMIN — LITHIUM CARBONATE 450 MILLIGRAM(S): 300 TABLET, EXTENDED RELEASE ORAL at 20:43

## 2019-10-02 RX ADMIN — Medication 200 MILLIGRAM(S): at 09:05

## 2019-10-02 RX ADMIN — Medication 1 MILLIGRAM(S): at 09:05

## 2019-10-02 RX ADMIN — Medication 650 MILLIGRAM(S): at 22:04

## 2019-10-02 RX ADMIN — Medication 1000 UNIT(S): at 09:05

## 2019-10-02 RX ADMIN — Medication 1 MILLIGRAM(S): at 09:06

## 2019-10-02 RX ADMIN — Medication 75 MICROGRAM(S): at 09:05

## 2019-10-02 RX ADMIN — POLYETHYLENE GLYCOL 3350 17 GRAM(S): 17 POWDER, FOR SOLUTION ORAL at 09:05

## 2019-10-03 ENCOUNTER — OTHER (OUTPATIENT)
Age: 39
End: 2019-10-03

## 2019-10-03 VITALS — TEMPERATURE: 98 F

## 2019-10-03 PROCEDURE — 99238 HOSP IP/OBS DSCHRG MGMT 30/<: CPT

## 2019-10-03 RX ADMIN — Medication 1 MILLIGRAM(S): at 08:15

## 2019-10-03 RX ADMIN — Medication 650 MILLIGRAM(S): at 13:51

## 2019-10-03 RX ADMIN — Medication 1000 UNIT(S): at 08:15

## 2019-10-03 RX ADMIN — POLYETHYLENE GLYCOL 3350 17 GRAM(S): 17 POWDER, FOR SOLUTION ORAL at 08:42

## 2019-10-03 RX ADMIN — Medication 200 MILLIGRAM(S): at 08:42

## 2019-10-03 RX ADMIN — LITHIUM CARBONATE 450 MILLIGRAM(S): 300 TABLET, EXTENDED RELEASE ORAL at 08:42

## 2019-10-03 RX ADMIN — Medication 650 MILLIGRAM(S): at 12:32

## 2019-10-03 RX ADMIN — Medication 75 MICROGRAM(S): at 08:41

## 2019-10-14 ENCOUNTER — EMERGENCY (EMERGENCY)
Facility: HOSPITAL | Age: 39
LOS: 1 days | Discharge: HOME | End: 2019-10-14
Attending: EMERGENCY MEDICINE | Admitting: EMERGENCY MEDICINE
Payer: MEDICARE

## 2019-10-14 VITALS
HEART RATE: 92 BPM | TEMPERATURE: 98 F | SYSTOLIC BLOOD PRESSURE: 101 MMHG | RESPIRATION RATE: 16 BRPM | OXYGEN SATURATION: 100 % | DIASTOLIC BLOOD PRESSURE: 69 MMHG

## 2019-10-14 DIAGNOSIS — F79 UNSPECIFIED INTELLECTUAL DISABILITIES: ICD-10-CM

## 2019-10-14 DIAGNOSIS — F60.3 BORDERLINE PERSONALITY DISORDER: ICD-10-CM

## 2019-10-14 DIAGNOSIS — F31.9 BIPOLAR DISORDER, UNSPECIFIED: ICD-10-CM

## 2019-10-14 LAB
ALBUMIN SERPL ELPH-MCNC: 4.3 G/DL — SIGNIFICANT CHANGE UP (ref 3.3–5)
ALP SERPL-CCNC: 71 U/L — SIGNIFICANT CHANGE UP (ref 40–120)
ALT FLD-CCNC: 21 U/L — SIGNIFICANT CHANGE UP (ref 4–33)
AMPHET UR-MCNC: NEGATIVE — SIGNIFICANT CHANGE UP
ANION GAP SERPL CALC-SCNC: 14 MMO/L — SIGNIFICANT CHANGE UP (ref 7–14)
APAP SERPL-MCNC: < 15 UG/ML — LOW (ref 15–25)
APPEARANCE UR: CLEAR — SIGNIFICANT CHANGE UP
AST SERPL-CCNC: 21 U/L — SIGNIFICANT CHANGE UP (ref 4–32)
BACTERIA # UR AUTO: NEGATIVE — SIGNIFICANT CHANGE UP
BARBITURATES UR SCN-MCNC: NEGATIVE — SIGNIFICANT CHANGE UP
BASOPHILS # BLD AUTO: 0.09 K/UL — SIGNIFICANT CHANGE UP (ref 0–0.2)
BASOPHILS NFR BLD AUTO: 0.9 % — SIGNIFICANT CHANGE UP (ref 0–2)
BENZODIAZ UR-MCNC: NEGATIVE — SIGNIFICANT CHANGE UP
BILIRUB SERPL-MCNC: < 0.2 MG/DL — LOW (ref 0.2–1.2)
BILIRUB UR-MCNC: NEGATIVE — SIGNIFICANT CHANGE UP
BLOOD UR QL VISUAL: SIGNIFICANT CHANGE UP
BUN SERPL-MCNC: 14 MG/DL — SIGNIFICANT CHANGE UP (ref 7–23)
CALCIUM SERPL-MCNC: 10 MG/DL — SIGNIFICANT CHANGE UP (ref 8.4–10.5)
CANNABINOIDS UR-MCNC: NEGATIVE — SIGNIFICANT CHANGE UP
CHLORIDE SERPL-SCNC: 104 MMOL/L — SIGNIFICANT CHANGE UP (ref 98–107)
CO2 SERPL-SCNC: 23 MMOL/L — SIGNIFICANT CHANGE UP (ref 22–31)
COCAINE METAB.OTHER UR-MCNC: NEGATIVE — SIGNIFICANT CHANGE UP
COLOR SPEC: SIGNIFICANT CHANGE UP
CREAT SERPL-MCNC: 1.06 MG/DL — SIGNIFICANT CHANGE UP (ref 0.5–1.3)
EOSINOPHIL # BLD AUTO: 0.22 K/UL — SIGNIFICANT CHANGE UP (ref 0–0.5)
EOSINOPHIL NFR BLD AUTO: 2.3 % — SIGNIFICANT CHANGE UP (ref 0–6)
ETHANOL BLD-MCNC: < 10 MG/DL — SIGNIFICANT CHANGE UP
GLUCOSE SERPL-MCNC: 96 MG/DL — SIGNIFICANT CHANGE UP (ref 70–99)
GLUCOSE UR-MCNC: NEGATIVE — SIGNIFICANT CHANGE UP
HCG SERPL-ACNC: < 5 MIU/ML — SIGNIFICANT CHANGE UP
HCT VFR BLD CALC: 45.1 % — HIGH (ref 34.5–45)
HGB BLD-MCNC: 13.6 G/DL — SIGNIFICANT CHANGE UP (ref 11.5–15.5)
HYALINE CASTS # UR AUTO: NEGATIVE — SIGNIFICANT CHANGE UP
IMM GRANULOCYTES NFR BLD AUTO: 0.4 % — SIGNIFICANT CHANGE UP (ref 0–1.5)
KETONES UR-MCNC: NEGATIVE — SIGNIFICANT CHANGE UP
LEUKOCYTE ESTERASE UR-ACNC: SIGNIFICANT CHANGE UP
LYMPHOCYTES # BLD AUTO: 2.58 K/UL — SIGNIFICANT CHANGE UP (ref 1–3.3)
LYMPHOCYTES # BLD AUTO: 26.7 % — SIGNIFICANT CHANGE UP (ref 13–44)
MCHC RBC-ENTMCNC: 30.2 % — LOW (ref 32–36)
MCHC RBC-ENTMCNC: 31 PG — SIGNIFICANT CHANGE UP (ref 27–34)
MCV RBC AUTO: 102.7 FL — HIGH (ref 80–100)
METHADONE UR-MCNC: NEGATIVE — SIGNIFICANT CHANGE UP
MONOCYTES # BLD AUTO: 0.67 K/UL — SIGNIFICANT CHANGE UP (ref 0–0.9)
MONOCYTES NFR BLD AUTO: 6.9 % — SIGNIFICANT CHANGE UP (ref 2–14)
NEUTROPHILS # BLD AUTO: 6.06 K/UL — SIGNIFICANT CHANGE UP (ref 1.8–7.4)
NEUTROPHILS NFR BLD AUTO: 62.8 % — SIGNIFICANT CHANGE UP (ref 43–77)
NITRITE UR-MCNC: NEGATIVE — SIGNIFICANT CHANGE UP
NRBC # FLD: 0 K/UL — SIGNIFICANT CHANGE UP (ref 0–0)
OPIATES UR-MCNC: NEGATIVE — SIGNIFICANT CHANGE UP
OXYCODONE UR-MCNC: NEGATIVE — SIGNIFICANT CHANGE UP
PCP UR-MCNC: NEGATIVE — SIGNIFICANT CHANGE UP
PH UR: 6.5 — SIGNIFICANT CHANGE UP (ref 5–8)
PLATELET # BLD AUTO: 332 K/UL — SIGNIFICANT CHANGE UP (ref 150–400)
PMV BLD: 10.8 FL — SIGNIFICANT CHANGE UP (ref 7–13)
POTASSIUM SERPL-MCNC: 4.2 MMOL/L — SIGNIFICANT CHANGE UP (ref 3.5–5.3)
POTASSIUM SERPL-SCNC: 4.2 MMOL/L — SIGNIFICANT CHANGE UP (ref 3.5–5.3)
PROT SERPL-MCNC: 7.8 G/DL — SIGNIFICANT CHANGE UP (ref 6–8.3)
PROT UR-MCNC: NEGATIVE — SIGNIFICANT CHANGE UP
RBC # BLD: 4.39 M/UL — SIGNIFICANT CHANGE UP (ref 3.8–5.2)
RBC # FLD: 12.1 % — SIGNIFICANT CHANGE UP (ref 10.3–14.5)
RBC CASTS # UR COMP ASSIST: SIGNIFICANT CHANGE UP (ref 0–?)
SALICYLATES SERPL-MCNC: < 5 MG/DL — LOW (ref 15–30)
SODIUM SERPL-SCNC: 141 MMOL/L — SIGNIFICANT CHANGE UP (ref 135–145)
SP GR SPEC: 1.01 — SIGNIFICANT CHANGE UP (ref 1–1.04)
SQUAMOUS # UR AUTO: SIGNIFICANT CHANGE UP
T3 SERPL-MCNC: 84.2 NG/DL — SIGNIFICANT CHANGE UP (ref 80–200)
TSH SERPL-MCNC: 11.77 UIU/ML — HIGH (ref 0.27–4.2)
UROBILINOGEN FLD QL: NORMAL — SIGNIFICANT CHANGE UP
WBC # BLD: 9.66 K/UL — SIGNIFICANT CHANGE UP (ref 3.8–10.5)
WBC # FLD AUTO: 9.66 K/UL — SIGNIFICANT CHANGE UP (ref 3.8–10.5)
WBC UR QL: SIGNIFICANT CHANGE UP (ref 0–?)

## 2019-10-14 PROCEDURE — 99285 EMERGENCY DEPT VISIT HI MDM: CPT

## 2019-10-14 PROCEDURE — 73130 X-RAY EXAM OF HAND: CPT | Mod: 26,LT

## 2019-10-14 PROCEDURE — 90792 PSYCH DIAG EVAL W/MED SRVCS: CPT

## 2019-10-14 RX ORDER — DIPHENHYDRAMINE HCL 50 MG
50 CAPSULE ORAL ONCE
Refills: 0 | Status: COMPLETED | OUTPATIENT
Start: 2019-10-14 | End: 2019-10-14

## 2019-10-14 RX ORDER — CHLORPROMAZINE HCL 10 MG
100 TABLET ORAL ONCE
Refills: 0 | Status: COMPLETED | OUTPATIENT
Start: 2019-10-14 | End: 2019-10-14

## 2019-10-14 RX ORDER — ACETAMINOPHEN 500 MG
650 TABLET ORAL ONCE
Refills: 0 | Status: COMPLETED | OUTPATIENT
Start: 2019-10-14 | End: 2019-10-14

## 2019-10-14 RX ADMIN — Medication 50 MILLIGRAM(S): at 21:18

## 2019-10-14 RX ADMIN — Medication 2 MILLIGRAM(S): at 19:09

## 2019-10-14 RX ADMIN — Medication 100 MILLIGRAM(S): at 21:18

## 2019-10-14 RX ADMIN — Medication 100 MILLIGRAM(S): at 19:09

## 2019-10-14 RX ADMIN — Medication 650 MILLIGRAM(S): at 21:17

## 2019-10-14 RX ADMIN — Medication 650 MILLIGRAM(S): at 20:26

## 2019-10-14 RX ADMIN — Medication 2 MILLIGRAM(S): at 21:18
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
## 2019-10-14 NOTE — ED BEHAVIORAL HEALTH NOTE - BEHAVIORAL HEALTH NOTE
VINNY discussed transportation arrangements with staff member Carolina.  Carolina reports that pt normally travels via taxi upon discharge as that is what the  recommended.  Dr. Webb reports that pt should travel via EMS for safety purposes.  VINNY contacted manager, Sousa, at [phone] re: transport.  Sousa reports that ambulance transport would be too expensive for pt.  VINNY explained that transport would be billed to Medicare and/or Medicaid if necessary.  Lars was agreeable to transport via ambulance.  Provider, Dr. Webb, in agreement with ambulance transport as well.  Discussed with ALEXANDRA Parker.  Ambulance arrangements to be made by RN.
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
## 2019-10-14 NOTE — ED PROVIDER NOTE - CARE PLAN
Principal Discharge DX:	Borderline personality disorder  Secondary Diagnosis:	Intellectual disability Principal Discharge DX:	Borderline personality disorder  Secondary Diagnosis:	Intellectual disability  Secondary Diagnosis:	Agitation requiring sedation protocol

## 2019-10-14 NOTE — ED PROVIDER NOTE - PHYSICAL EXAMINATION
NECK - No trauma to neck, no hoarseness, no shortness of breath.    L hand - tenderness over left 4th and 5th metacarpals.  No swelling, redness, deformity.

## 2019-10-14 NOTE — ED BEHAVIORAL HEALTH ASSESSMENT NOTE - CURRENT MEDICATION
Lithium CR 450mg BID, Seroquel 200mg HS,  Topamax 200mg BID,  Trazodone 50mg HS, Klonopin 1mg BID, Cogentin 1mg BID

## 2019-10-14 NOTE — ED ADULT NURSE REASSESSMENT NOTE - NS ED NURSE REASSESS COMMENT FT1
Received pt laying in bed, sleeping + effect from medications received, easily aroused, NAD. Pt easily aroused, alert to person and place, VSS. Denies current s/i h/i. Lab result pending. PO fluids and meal provided. Will continue to monitor for safety.

## 2019-10-14 NOTE — ED PROVIDER NOTE - OBJECTIVE STATEMENT
39 year old female history of bipolar, hypothyroidism and mild MR presents for agitation and SI.  Patient states that she got upset at group home because she did not get money today and became aggressive.  EMS and PD called when once patient got to hospital patient screaming that she wants to kill herself and attempted to wrap the wires from BP cuff and pulse ox around her neck, which was blocked by staff.  Patient also punched wall with left hand and is complaining of left hand pain.  Medications offered. No other physical complaints.

## 2019-10-14 NOTE — ED PROVIDER NOTE - CLINICAL SUMMARY MEDICAL DECISION MAKING FREE TEXT BOX
39 year old female history of bipolar, hypothyroidism and mild MR presents for agitation and SI.  No neck trauma noted, however patient is complaining of pain to L hand so will do XR. Will do labs, EKG. Patient is awaiting psychiatric consultation. Final disposition will be determined by psychiatrist once medically cleared. 39 year old female history of bipolar, hypothyroidism and mild MR presents for agitation and SI.  No neck trauma noted, however patient is complaining of pain to L hand so will do XR. Will do labs, EKG. Patient is awaiting psychiatric consultation. Final disposition will be determined by psychiatrist once medically cleared.  xray- clear no fractures, no dislocation  blood work - elevated TSH, hx hypothyroidism,  ordered additional labs T3, T4 follow up outpatient  There is no clinical evidence of intoxication, or any acute medical problem requiring immediate intervention.  Will be discharged back to group home.

## 2019-10-14 NOTE — ED PROVIDER NOTE - NSFOLLOWUPCLINICS_GEN_ALL_ED_FT
[facility]  Behavioral Health  [id number] [place] [address]  Phone: [phone]  Fax:   Follow Up Time:

## 2019-10-14 NOTE — ED BEHAVIORAL HEALTH ASSESSMENT NOTE - DESCRIPTION
obesity, hypothyroidism, glaucoma Whilst at the triage, Pt screamed that she "wanted to kill herself".  She was noted to attempt wrapping the wires from a BP cuff and pulse ox around her neck but was prevented by  staff.  Pt also hit hard object with her left hand and afterwards, complained of a left hand pain.  The Pt was subsequently medicated with Thorazine 100mg IM + Ativan 2mg IM at 18:56Hrs. Since after being medicated, the Pt became calm and was cooperative.  There has been no recurrence of agitation/aggressive behavior.  No verbalization of active/ passive SI/HI.   No signs/symptoms of acute jory or florid psychosis.  No AH/VH.  Pt is not showing any signs/symptoms of intoxication or withdrawal.  She has not tested limits and was able to maintain appropriate boundaries. She was easily redirected.  Overall, no management issues.     Pertinent labs noted    Vital Signs Last 24 Hrs  T(C): --  T(F): --  HR: --  BP: --  BP(mean): --  RR: --  SpO2: -- Lives in [facility] home since Summer 2015

## 2019-10-14 NOTE — ED PROVIDER NOTE - CPE EDP PSYCH NORM
Patient S/p PEG 10/4  Patient with Gaseous distention s/p PEG   KUB 10/5: Multiple Non-dilated loops of small and large bowel   GI follow up appreciated will attempt trickle feeds   Will add ATC Simethicone and monitor for residuals normal...

## 2019-10-14 NOTE — ED BEHAVIORAL HEALTH ASSESSMENT NOTE - PAST PSYCHOTROPIC MEDICATION
Lithium ER 450mg BID, Cogentin 1 mg BID, Invega ER 9mg QHS, trazodone 50mg QHS, Topamax 200mg BID, Seroquel 200mg QHS, Klonopin 1 mg BID    Colace 2 caps QHS, Synthroid 0.075mg daily, senna 8.6 mg QHS, lactulose 30ml QHS, omega fish oil 1000mg BID, , Calcium +Vit D 400IU daily, , Latanoprost 0.005% eye drops each eye QHS, ammonium lactate 12% apply to feet QHS, Aspercreme lotion, vitamin D3 1000units daily, Econazole nit 1% creme BID, Miconazorb AF 2% powder BID, Erythromycin BID.
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
## 2019-10-14 NOTE — ED ADULT NURSE NOTE - OBJECTIVE STATEMENT
received pt agitated and uncooperative at this time. medicated as per MD order. bloods drawn and sent to the lab. pt denies hi/si at this time. pt ate dinner. will monitor closely. dispo pending

## 2019-10-14 NOTE — ED BEHAVIORAL HEALTH ASSESSMENT NOTE - OTHER
[facility] Reference #: [id number]: 09/25/2019 09/26/2019 Clonazepam 1 mg x  90tabs for 30 days  Caryn Deng MD conflict at the group  home staff manipulative by hx: impaired concrete at times easily distracted chronically limited
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
## 2019-10-14 NOTE — ED BEHAVIORAL HEALTH ASSESSMENT NOTE - SAFETY PLAN ADDT'L DETAILS
Provision of National Suicide Prevention Lifeline 7-658-916-WYMX (7160)/Education provided regarding environmental safety / lethal means restriction/Safety plan discussed with...

## 2019-10-14 NOTE — ED PROVIDER NOTE - PATIENT PORTAL LINK FT
You can access the FollowMyHealth Patient Portal offered by [facility] by registering at the following website: [email or web address]. By joining [facility]’s FollowMyHealth portal, you will also be able to view your health information using other applications (apps) compatible with our system.

## 2019-10-14 NOTE — ED BEHAVIORAL HEALTH ASSESSMENT NOTE - VIOLENCE RISK FACTORS:
Lack of insight into violence risk/need for treatment/Community stressors that increase the risk of destabilization/Violent ideation/threat/speech/Impulsivity/Affective dysregulation/Irritability

## 2019-10-14 NOTE — ED PROVIDER NOTE - ATTENDING CONTRIBUTION TO CARE
39F p/w agitation - was at group home had temper tantrum, screaming she wanted to kill herself, punching a wall and wrapping the wire around her neck.  Rx'ed thorazine and ativan, now calm, still endorsing SI.  L hand pain 4th 5th MCP after punching wall.  Plan check xray, labs, rx sedation.  Pt still agitated despite initial dose will rx add'l IM sedation.    VS:  pending    GEN - mild distress agitation, disruptive; A+O x3   HEAD - NC/AT     ENT - PEERL, EOMI, mucous membranes  moist , no discharge      NECK: Neck supple, non-tender without lymphadenopathy, no masses, no JVD  PULM - CTA b/l,  symmetric breath sounds  COR -  normal heart sounds    ABD - , ND, NT, soft,  BACK - no CVA tenderness, nontender spine     EXTREMS - no edema, no deformity, warm and well perfused.  L hand mild swelling.   SKIN - no rash or bruising      NEUROLOGIC - alert, CN 2-12 intact, sensation nl, motor no focal deficit. 39F p/w agitation - was at group home had temper tantrum, screaming she wanted to kill herself, punching a wall and wrapping the wire around her neck.  Rx'ed thorazine and ativan, now calm, still endorsing SI.  L hand pain 4th 5th MCP after punching wall.  Plan check xray r/o fx, labs check for agitation-inducing medical problem like hypoglycemia, uremia, sodium abnormality, rx sedation.  Pt still agitated despite initial dose will rx add'l IM sedation.    VS:  pending    GEN - mild distress agitation, disruptive; A+O x3   HEAD - NC/AT     ENT - PEERL, EOMI, mucous membranes  moist , no discharge      NECK: Neck supple, non-tender without lymphadenopathy, no masses, no JVD  PULM - CTA b/l,  symmetric breath sounds  COR -  normal heart sounds    ABD - , ND, NT, soft,  BACK - no CVA tenderness, nontender spine     EXTREMS - no edema, no deformity, warm and well perfused.  L hand mild swelling.   SKIN - no rash or bruising      NEUROLOGIC - alert, CN 2-12 intact, sensation nl, motor no focal deficit.

## 2019-10-14 NOTE — ED ADULT TRIAGE NOTE - CHIEF COMPLAINT QUOTE
Pt brought from [facility] for SI.  Pt tried to wrap BP cuff wire around her neck in triage screaming that she wanted to die.  PMH Bipolar, intellectual disability

## 2019-10-15 VITALS
RESPIRATION RATE: 18 BRPM | DIASTOLIC BLOOD PRESSURE: 78 MMHG | SYSTOLIC BLOOD PRESSURE: 120 MMHG | HEART RATE: 94 BPM | OXYGEN SATURATION: 99 %

## 2019-10-15 LAB — T4 AB SER-ACNC: 6.08 UG/DL — SIGNIFICANT CHANGE UP (ref 5.1–13)

## 2019-12-02 ENCOUNTER — APPOINTMENT (OUTPATIENT)
Dept: ENDOCRINOLOGY | Facility: CLINIC | Age: 39
End: 2019-12-02
Payer: MEDICARE

## 2019-12-02 VITALS
DIASTOLIC BLOOD PRESSURE: 70 MMHG | HEIGHT: 65 IN | WEIGHT: 268 LBS | SYSTOLIC BLOOD PRESSURE: 108 MMHG | OXYGEN SATURATION: 98 % | HEART RATE: 100 BPM | BODY MASS INDEX: 44.65 KG/M2

## 2019-12-02 PROCEDURE — 99214 OFFICE O/P EST MOD 30 MIN: CPT

## 2019-12-02 NOTE — PHYSICAL EXAM
[Alert] : alert [Well Developed] : well developed [Well Nourished] : well nourished [Normal Sclera/Conjunctiva] : normal sclera/conjunctiva [PERRL] : pupils equal, round and reactive to light [EOMI] : extra ocular movement intact [No Proptosis] : no proptosis [Normal Outer Ear/Nose] : the ears and nose were normal in appearance [No Neck Mass] : no neck mass was observed [Normal Hearing] : hearing was normal [Normal TMs] : both tympanic membranes were normal [Supple] : the neck was supple [Thyroid Not Enlarged] : the thyroid was not enlarged [No Respiratory Distress] : no respiratory distress [Normal Rate and Effort] : normal respiratory rhythm and effort [Clear to Auscultation] : lungs were clear to auscultation bilaterally [Normal Rate] : heart rate was normal  [Regular Rhythm] : with a regular rhythm [Normal Bowel Sounds] : normal bowel sounds [Normal S1, S2] : normal S1 and S2 [Not Tender] : non-tender [Soft] : abdomen soft [Not Distended] : not distended [No CVA Tenderness] : no ~M costovertebral angle tenderness [No Joint Swelling] : no joint swelling seen [Normal Gait] : normal gait [Normal Strength/Tone] : muscle strength and tone were normal [No Clubbing, Cyanosis] : no clubbing  or cyanosis of the fingernails [No Rash] : no rash [Normal Reflexes] : deep tendon reflexes were 2+ and symmetric [No Skin Lesions] : no skin lesions [No Motor Deficits] : the motor exam was normal [Oriented x3] : oriented to person, place, and time [No Tremors] : no tremors [Normal Insight/Judgement] : insight and judgment were intact [Normal Affect] : the affect was normal [Normal Mood] : the mood was normal [de-identified] : + Excessively crying and depressed

## 2019-12-02 NOTE — PHYSICAL EXAM
[Alert] : alert [Well Developed] : well developed [Well Nourished] : well nourished [Normal Sclera/Conjunctiva] : normal sclera/conjunctiva [PERRL] : pupils equal, round and reactive to light [EOMI] : extra ocular movement intact [No Proptosis] : no proptosis [Normal Outer Ear/Nose] : the ears and nose were normal in appearance [Normal Hearing] : hearing was normal [No Neck Mass] : no neck mass was observed [Normal TMs] : both tympanic membranes were normal [Thyroid Not Enlarged] : the thyroid was not enlarged [Supple] : the neck was supple [No Respiratory Distress] : no respiratory distress [Normal Rate and Effort] : normal respiratory rhythm and effort [Clear to Auscultation] : lungs were clear to auscultation bilaterally [Normal Rate] : heart rate was normal  [Normal S1, S2] : normal S1 and S2 [Normal Bowel Sounds] : normal bowel sounds [Regular Rhythm] : with a regular rhythm [Not Tender] : non-tender [Not Distended] : not distended [Soft] : abdomen soft [No CVA Tenderness] : no ~M costovertebral angle tenderness [No Joint Swelling] : no joint swelling seen [Normal Gait] : normal gait [Normal Strength/Tone] : muscle strength and tone were normal [No Clubbing, Cyanosis] : no clubbing  or cyanosis of the fingernails [No Rash] : no rash [Normal Reflexes] : deep tendon reflexes were 2+ and symmetric [No Skin Lesions] : no skin lesions [No Motor Deficits] : the motor exam was normal [No Tremors] : no tremors [Oriented x3] : oriented to person, place, and time [Normal Insight/Judgement] : insight and judgment were intact [Normal Affect] : the affect was normal [Normal Mood] : the mood was normal [de-identified] : + Excessively crying and depressed

## 2019-12-02 NOTE — ASSESSMENT
[FreeTextEntry1] : 38 year old female with history of bipolar disease here for follow up of hyperprolactinemia. Overall it appears that that given her MRI is negative high likelihood this is secondary to Invega causing drug induced hyperprolactenemia. \par She is asymptomatic. \par Will be checking thyroid function along with prolactin levels today. \par \par -Check prolactin levels, TFTs\par -Continue Levothyroxine dosing at 137 mcg for now (1.5 tabs on sunday) \par -Will be called back if dosage adjustment is necessary\par \par \par Addendum:\par TFTS are normal along with prolactin\par -Will repeat bloodwork in 6 months and can follow up in one year.

## 2019-12-02 NOTE — HISTORY OF PRESENT ILLNESS
[FreeTextEntry1] : Ms. Gu is a 37 year old female with long term history of bilpolar disease, hypothyroidism who lives in a group home presented today for evaluation of hyperprolactenemia along with hypothyroidism. \par \par Previous HPI:\par ------------------------------------------------------------------------------------------------------------------------------------------\par As per the health aid, patient's last period was in 10/2017 and recently her Ob/gyn found her to have an elevated prolactin level to 101. A subsequent pituitary MRI was negative for any adenoma. She has been managed on benztropine, seroquel and Invega as her anti-psychotics. Otherwise, denied any galatorrhea, nausea or dizziness. Patient was overall a poor historian. \par She also has a long term history of being on levothyroxine for hypothyroidism with her most current dose being 100 mcg. Her last TSH checked on 01/12 on that dose was 9.02. Patient denied any weight gain, hair loss or excessive fatigue. \par -----------------------------------------------------------------------------------------------------------------------------------------------------\par In the interim, \par Invega was decreased to 6 mg daily. Otherwise, seroquel was discontinued. Dr. Meek and I had personally spoken and consensus on lowering the psych meds was made. Patient saw psychiatry in 03/2018, when the dosing was changed. \par First period returned on 03/31 and then she is currently getting her period. No nausea or dizziness. No discharge from her breast. Levothyroxine dose increased last time to 125 mcg daily. \par \par \par -------------------------------------------------------------------------\par Change in medication: \par Recent change in lithium 300 mg BID and seroquel 25 mg BID\par \par Symptoms: \par No breast discharge \par Normal periods ( LMP: 05/02) \par She lost 7 lbs \par

## 2019-12-03 LAB
ANION GAP SERPL CALC-SCNC: 8 MMOL/L
BUN SERPL-MCNC: 13 MG/DL
CALCIUM SERPL-MCNC: 9.4 MG/DL
CHLORIDE SERPL-SCNC: 111 MMOL/L
CHOLEST SERPL-MCNC: 204 MG/DL
CHOLEST/HDLC SERPL: 4.3 RATIO
CO2 SERPL-SCNC: 24 MMOL/L
CREAT SERPL-MCNC: 0.84 MG/DL
GLUCOSE SERPL-MCNC: 108 MG/DL
HDLC SERPL-MCNC: 47 MG/DL
LDLC SERPL CALC-MCNC: 124 MG/DL
POTASSIUM SERPL-SCNC: 3.9 MMOL/L
PROLACTIN SERPL-MCNC: 9.1 NG/ML
SODIUM SERPL-SCNC: 143 MMOL/L
T3 SERPL-MCNC: 82 NG/DL
T4 FREE SERPL-MCNC: 1 NG/DL
TRIGL SERPL-MCNC: 167 MG/DL
TSH SERPL-ACNC: 3.33 UIU/ML

## 2019-12-25 ENCOUNTER — INPATIENT (INPATIENT)
Facility: HOSPITAL | Age: 39
LOS: 12 days | Discharge: NURSING, REHAB OR LONG-TERM CARE | End: 2020-01-07
Attending: INTERNAL MEDICINE | Admitting: INTERNAL MEDICINE
Payer: MEDICARE

## 2019-12-25 VITALS
OXYGEN SATURATION: 96 % | DIASTOLIC BLOOD PRESSURE: 89 MMHG | SYSTOLIC BLOOD PRESSURE: 139 MMHG | TEMPERATURE: 99 F | RESPIRATION RATE: 17 BRPM | HEART RATE: 102 BPM

## 2019-12-25 DIAGNOSIS — F43.20 ADJUSTMENT DISORDER, UNSPECIFIED: ICD-10-CM

## 2019-12-25 LAB
ANION GAP SERPL CALC-SCNC: 14 MMOL/L — SIGNIFICANT CHANGE UP (ref 7–14)
APAP SERPL-MCNC: <5 UG/ML — LOW (ref 10–30)
BASOPHILS # BLD AUTO: 0.09 K/UL — SIGNIFICANT CHANGE UP (ref 0–0.2)
BASOPHILS NFR BLD AUTO: 0.9 % — SIGNIFICANT CHANGE UP (ref 0–1)
BUN SERPL-MCNC: 19 MG/DL — SIGNIFICANT CHANGE UP (ref 10–20)
CALCIUM SERPL-MCNC: 9.7 MG/DL — SIGNIFICANT CHANGE UP (ref 8.5–10.1)
CHLORIDE SERPL-SCNC: 108 MMOL/L — SIGNIFICANT CHANGE UP (ref 98–110)
CO2 SERPL-SCNC: 20 MMOL/L — SIGNIFICANT CHANGE UP (ref 17–32)
CREAT SERPL-MCNC: 0.9 MG/DL — SIGNIFICANT CHANGE UP (ref 0.7–1.5)
EOSINOPHIL # BLD AUTO: 0.17 K/UL — SIGNIFICANT CHANGE UP (ref 0–0.7)
EOSINOPHIL NFR BLD AUTO: 1.7 % — SIGNIFICANT CHANGE UP (ref 0–8)
GLUCOSE SERPL-MCNC: 103 MG/DL — HIGH (ref 70–99)
HCT VFR BLD CALC: 38.7 % — SIGNIFICANT CHANGE UP (ref 37–47)
HGB BLD-MCNC: 12.1 G/DL — SIGNIFICANT CHANGE UP (ref 12–16)
IMM GRANULOCYTES NFR BLD AUTO: 0.5 % — HIGH (ref 0.1–0.3)
LITHIUM SERPL-MCNC: 0.47 MMOL/L — LOW (ref 0.6–1.2)
LYMPHOCYTES # BLD AUTO: 2.66 K/UL — SIGNIFICANT CHANGE UP (ref 1.2–3.4)
LYMPHOCYTES # BLD AUTO: 26.1 % — SIGNIFICANT CHANGE UP (ref 20.5–51.1)
MCHC RBC-ENTMCNC: 31.1 PG — HIGH (ref 27–31)
MCHC RBC-ENTMCNC: 31.3 G/DL — LOW (ref 32–37)
MCV RBC AUTO: 99.5 FL — HIGH (ref 81–99)
MONOCYTES # BLD AUTO: 0.72 K/UL — HIGH (ref 0.1–0.6)
MONOCYTES NFR BLD AUTO: 7.1 % — SIGNIFICANT CHANGE UP (ref 1.7–9.3)
NEUTROPHILS # BLD AUTO: 6.52 K/UL — HIGH (ref 1.4–6.5)
NEUTROPHILS NFR BLD AUTO: 63.7 % — SIGNIFICANT CHANGE UP (ref 42.2–75.2)
NRBC # BLD: 0 /100 WBCS — SIGNIFICANT CHANGE UP (ref 0–0)
PLATELET # BLD AUTO: 288 K/UL — SIGNIFICANT CHANGE UP (ref 130–400)
POTASSIUM SERPL-MCNC: 4 MMOL/L — SIGNIFICANT CHANGE UP (ref 3.5–5)
POTASSIUM SERPL-SCNC: 4 MMOL/L — SIGNIFICANT CHANGE UP (ref 3.5–5)
RBC # BLD: 3.89 M/UL — LOW (ref 4.2–5.4)
RBC # FLD: 12.6 % — SIGNIFICANT CHANGE UP (ref 11.5–14.5)
SALICYLATES SERPL-MCNC: <0.3 MG/DL — LOW (ref 4–30)
SODIUM SERPL-SCNC: 142 MMOL/L — SIGNIFICANT CHANGE UP (ref 135–146)
WBC # BLD: 10.21 K/UL — SIGNIFICANT CHANGE UP (ref 4.8–10.8)
WBC # FLD AUTO: 10.21 K/UL — SIGNIFICANT CHANGE UP (ref 4.8–10.8)

## 2019-12-25 PROCEDURE — 93010 ELECTROCARDIOGRAM REPORT: CPT

## 2019-12-25 PROCEDURE — 99223 1ST HOSP IP/OBS HIGH 75: CPT

## 2019-12-25 PROCEDURE — 99285 EMERGENCY DEPT VISIT HI MDM: CPT

## 2019-12-25 RX ORDER — CLONAZEPAM 1 MG
1 TABLET ORAL ONCE
Refills: 0 | Status: DISCONTINUED | OUTPATIENT
Start: 2019-12-25 | End: 2019-12-25

## 2019-12-25 RX ORDER — LITHIUM CARBONATE 300 MG/1
1 TABLET, EXTENDED RELEASE ORAL
Qty: 0 | Refills: 0 | DISCHARGE

## 2019-12-25 RX ORDER — OMEGA-3 ACID ETHYL ESTERS 1 G
1 CAPSULE ORAL
Qty: 0 | Refills: 0 | DISCHARGE

## 2019-12-25 RX ORDER — LITHIUM CARBONATE 300 MG/1
450 TABLET, EXTENDED RELEASE ORAL
Refills: 0 | Status: DISCONTINUED | OUTPATIENT
Start: 2019-12-26 | End: 2020-01-07

## 2019-12-25 RX ORDER — TOPIRAMATE 25 MG
1 TABLET ORAL
Qty: 0 | Refills: 0 | DISCHARGE

## 2019-12-25 RX ORDER — HYDROCORTISONE 1 %
1 OINTMENT (GRAM) TOPICAL DAILY
Refills: 0 | Status: DISCONTINUED | OUTPATIENT
Start: 2019-12-25 | End: 2020-01-07

## 2019-12-25 RX ORDER — DOCUSATE SODIUM 100 MG
100 CAPSULE ORAL ONCE
Refills: 0 | Status: COMPLETED | OUTPATIENT
Start: 2019-12-25 | End: 2019-12-25

## 2019-12-25 RX ORDER — TOPIRAMATE 25 MG
200 TABLET ORAL ONCE
Refills: 0 | Status: COMPLETED | OUTPATIENT
Start: 2019-12-25 | End: 2019-12-25

## 2019-12-25 RX ORDER — CHOLECALCIFEROL (VITAMIN D3) 125 MCG
1 CAPSULE ORAL
Qty: 0 | Refills: 0 | DISCHARGE

## 2019-12-25 RX ORDER — LEVOTHYROXINE SODIUM 125 MCG
137 TABLET ORAL DAILY
Refills: 0 | Status: DISCONTINUED | OUTPATIENT
Start: 2019-12-25 | End: 2020-01-07

## 2019-12-25 RX ORDER — ENOXAPARIN SODIUM 100 MG/ML
40 INJECTION SUBCUTANEOUS AT BEDTIME
Refills: 0 | Status: DISCONTINUED | OUTPATIENT
Start: 2019-12-25 | End: 2020-01-07

## 2019-12-25 RX ORDER — LITHIUM CARBONATE 300 MG/1
450 TABLET, EXTENDED RELEASE ORAL ONCE
Refills: 0 | Status: COMPLETED | OUTPATIENT
Start: 2019-12-25 | End: 2019-12-25

## 2019-12-25 RX ORDER — CHLORHEXIDINE GLUCONATE 213 G/1000ML
1 SOLUTION TOPICAL
Refills: 0 | Status: DISCONTINUED | OUTPATIENT
Start: 2019-12-25 | End: 2020-01-07

## 2019-12-25 RX ORDER — QUETIAPINE FUMARATE 200 MG/1
200 TABLET, FILM COATED ORAL AT BEDTIME
Refills: 0 | Status: DISCONTINUED | OUTPATIENT
Start: 2019-12-25 | End: 2020-01-07

## 2019-12-25 RX ORDER — CLONAZEPAM 1 MG
1 TABLET ORAL THREE TIMES A DAY
Refills: 0 | Status: DISCONTINUED | OUTPATIENT
Start: 2019-12-26 | End: 2020-01-02

## 2019-12-25 RX ORDER — LACTULOSE 10 G/15ML
20 SOLUTION ORAL AT BEDTIME
Refills: 0 | Status: DISCONTINUED | OUTPATIENT
Start: 2019-12-25 | End: 2020-01-07

## 2019-12-25 RX ORDER — SENNA PLUS 8.6 MG/1
1 TABLET ORAL ONCE
Refills: 0 | Status: COMPLETED | OUTPATIENT
Start: 2019-12-25 | End: 2019-12-25

## 2019-12-25 RX ORDER — SENNA PLUS 8.6 MG/1
1 TABLET ORAL
Qty: 0 | Refills: 0 | DISCHARGE

## 2019-12-25 RX ORDER — TOPIRAMATE 25 MG
200 TABLET ORAL
Refills: 0 | Status: DISCONTINUED | OUTPATIENT
Start: 2019-12-26 | End: 2020-01-07

## 2019-12-25 RX ORDER — LATANOPROST 0.05 MG/ML
1 SOLUTION/ DROPS OPHTHALMIC; TOPICAL AT BEDTIME
Refills: 0 | Status: DISCONTINUED | OUTPATIENT
Start: 2019-12-25 | End: 2020-01-07

## 2019-12-25 RX ADMIN — Medication 100 MILLIGRAM(S): at 22:12

## 2019-12-25 RX ADMIN — QUETIAPINE FUMARATE 200 MILLIGRAM(S): 200 TABLET, FILM COATED ORAL at 22:12

## 2019-12-25 RX ADMIN — Medication 200 MILLIGRAM(S): at 22:12

## 2019-12-25 RX ADMIN — SENNA PLUS 1 TABLET(S): 8.6 TABLET ORAL at 22:12

## 2019-12-25 RX ADMIN — Medication 1 MILLIGRAM(S): at 22:12

## 2019-12-25 RX ADMIN — LITHIUM CARBONATE 450 MILLIGRAM(S): 300 TABLET, EXTENDED RELEASE ORAL at 22:12

## 2019-12-25 NOTE — ED ADULT NURSE NOTE - IN THE PAST 12 MONTHS HAVE YOU USED DRUGS OTHER THAN THOSE REQUIRED FOR MEDICAL REASON?
Problem: Safety  Goal: Will remain free from injury  Outcome: PROGRESSING AS EXPECTED  Pt rings for assistance; nonskid socks on.    Problem: Respiratory:  Goal: Respiratory status will improve  Outcome: PROGRESSING SLOWER THAN EXPECTED  Pt requests oxymask over nasal cannula; able to wean from 8L oxymask to 5L/min. Pt ambulated in the hallway SBA. Did not feel SOB.       No

## 2019-12-25 NOTE — H&P ADULT - ATTENDING COMMENTS
Patient remain psychotic. Frequently screaming and crying in the emergency room needing one to one observation.   Patient requires psychiatry evaluation. Patient herself asking for psychiatry evaluation.   Await Psychiatry eval also need placement.   Currently one to one observation   Family couldn't reach.

## 2019-12-25 NOTE — ED PROVIDER NOTE - OBJECTIVE STATEMENT
39 F to ED with acute agitation at Group home  pt does not want to live at group home any more as she feels its not the best place for her  No trauma or fall  pt is compliant with her meds
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
## 2019-12-25 NOTE — H&P ADULT - ASSESSMENT
38 yo F w/ hx of bipolar disorder, intellectual disability, hypothyroidism presents to ED for placement    #Placement  -social work consult    #Bipolar disorder  -continue with Topamax, lithium, Seroquel, Klonopin    #Hypothyroidism  -continue with levothyroxine    #Misc  -Lovenox  -regular diet

## 2019-12-25 NOTE — H&P ADULT - NSHPPHYSICALEXAM_GEN_ALL_CORE
Vital Signs Last 24 Hrs  T(C): 37.3 (25 Dec 2019 17:08), Max: 37.3 (25 Dec 2019 17:08)  T(F): 99.1 (25 Dec 2019 17:08), Max: 99.1 (25 Dec 2019 17:08)  HR: 102 (25 Dec 2019 17:08) (102 - 102)  BP: 139/89 (25 Dec 2019 17:08) (139/89 - 139/89)  BP(mean): --  RR: 17 (25 Dec 2019 17:08) (17 - 17)  SpO2: 96% (25 Dec 2019 17:08) (96% - 96%)    GEN: NAD  HEENT: NCAT  CV: RRR  RESP CTAB  ABD: NTND, soft  EXT: SCHAFFER  NEURO: awake and alert

## 2019-12-25 NOTE — ED PROVIDER NOTE - NS ED ROS FT
ROS  Constitutional:  See HPI.  Eyes:  No visual changes, eye pain or discharge.  ENMT:  No hearing changes, pain, discharge or infections. No neck pain or stiffness.  Cardiac:  No chest pain, SOB or edema. No chest pain with exertion.  Respiratory:  No cough or respiratory distress. No hemoptysis.  GI:  No nausea, vomiting, diarrhea or abdominal pain.  :  No dysuria, frequency or burning.  MS:  No myalgia, muscle weakness, joint pain or back pain.     Skin:  No skin rash.  Except as documented in the HPI,  all other systems are negative.
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
## 2019-12-25 NOTE — ED PROVIDER NOTE - NSFOLLOWUPINSTRUCTIONS_ED_ALL_ED_FT
Dysphoria  Dysphoria is an emotion in which a person feels unpleasant or uncomfortable. It may involve mood changes and feelings of sadness, anxiety, irritability, and restlessness.  Dysphoria is often caused by normal life stress and usually goes away within several days. Dysphoria that lasts longer than several days may be a symptom of a mental illness, such as major depression or bipolar disorder.  Follow these instructions at home:            Monitor your mood for any changes. Take these steps to help with discomfort and unpleasant feelings:  Lifestyle     Maintain a healthy lifestyle.  Eat a healthy diet that includes mood boosters such as nuts, fatty fish, fruits, and vegetables.Exercise regularly. Aim for about 20 minutes a day of physical activity.Get enough sleep. Try to sleep at least 8 hours every night.Avoid alcohol and drugs.Learn ways to reduce stress and cope with stress, such as with yoga and meditation.Make time to do things that you enjoy.Do not use any products that contain nicotine or tobacco, such as cigarettes and e-cigarettes. If you need help quitting, ask your health care provider.General instructions     Take over-the-counter and prescription medicines only as told by your health care provider.Check with your health care provider before taking any herbs or supplements.Talk about your feelings with family members or health care providers.Keep all follow-up visits as told by your health care provider. This is important.Contact a health care provider if you:  Were given medicine and it does not seem to be helping.Feel hopeless and overwhelmed.Feel like you cannot leave your house.Have trouble taking care of yourself.Get help right away if you:  Have serious thoughts about hurting yourself or others.If you ever feel like you may hurt yourself or others, or have thoughts about taking your own life, get help right away. You can go to your nearest emergency department or call:   Your local emergency services (911 in the [place] A suicide crisis helpline, such as the National Suicide Prevention Lifeline at 1-313.455.2382. This is open 24 hours a day. Summary  Dysphoria is an emotion in which a person feels unpleasant or uncomfortable. It may involve mood changes and feelings of sadness, anxiety, irritability, and restlessness.Dysphoria that lasts longer than several days may be a symptom of a mental illness, such as major depression or bipolar disorder.Take steps to boost your mood by eating well and getting enough sleep and exercise. Be sure to see a health care provider if your symptoms worsen or do not go away with treatment.This information is not intended to replace advice given to you by your health care provider. Make sure you discuss any questions you have with your health care provider.

## 2019-12-25 NOTE — ED PROVIDER NOTE - PROGRESS NOTE DETAILS
d/w Charge RN as pt refusing to leave become acutely agitated.   States she wants to see SW , will not leave her room.  care plan with RN team, plan to place in OBS till aM d/w obs team and hosp, given care needed, will admit to inpt medicine

## 2019-12-25 NOTE — ED BEHAVIORAL HEALTH ASSESSMENT NOTE - DESCRIPTION
pt with 1:1 in private room. Originally calm, but became agitated during interview, yelling at provider. Pt was able to become calm when provided hospital food. She refused further assessment. Emergency psych medicine not required. arthritis, ID lives in group home since 2016

## 2019-12-25 NOTE — ED PROVIDER NOTE - NSFOLLOWUPCLINICS_GEN_ALL_ED_FT
[facility]  OP [facility]  [address] [place] [id number]  Phone: [phone]  Fax:   Follow Up Time: 1-3 Days

## 2019-12-25 NOTE — ED ADULT NURSE NOTE - NSIMPLEMENTINTERV_GEN_ALL_ED
Implemented All Universal Safety Interventions:  [place] to call system. Call bell, personal items and telephone within reach. Instruct patient to call for assistance. Room bathroom lighting operational. Non-slip footwear when patient is off stretcher. Physically safe environment: no spills, clutter or unnecessary equipment. Stretcher in lowest position, wheels locked, appropriate side rails in place.

## 2019-12-25 NOTE — ED PROVIDER NOTE - PATIENT PORTAL LINK FT
You can access the FollowMyHealth Patient Portal offered by  by registering at the following website: [email or web address]. By joining [employer]’s FollowMyHealth portal, you will also be able to view your health information using other applications (apps) compatible with our system.
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
## 2019-12-25 NOTE — ED BEHAVIORAL HEALTH ASSESSMENT NOTE - SAFETY PLAN DETAILS
should symptoms worsen please go to nearest ED, call 911, 1 [phone] life net, [phone] [facility] well

## 2019-12-25 NOTE — H&P ADULT - NSHPLABSRESULTS_GEN_ALL_CORE
LABS                        12.1   10.21 )-----------( 288      ( 12-25 @ 18:10 )             38.7     12-25    142  |  108  |  19  ----------------------------<  103<H>  4.0   |  20  |  0.9    Ca    9.7      25 Dec 2019 18:10
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
## 2019-12-25 NOTE — ED ADULT NURSE REASSESSMENT NOTE - NS ED NURSE REASSESS COMMENT FT1
pt admitted for placement . pt awaiting for social work in am. pt remains calm . 1;1 sit at bedside. medications given to security. rn will cont to monitor,
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
## 2019-12-25 NOTE — ED PROVIDER NOTE - PHYSICAL EXAMINATION
EXAM:  CONSTITUTIONAL: WA / WN / NAD  HEAD: NCAT  EYES: PERRL; EOMI; anicteric.  ENT: Normal pharynx; mucous membranes pink/moist, no erythema.  NECK: Supple; no meningeal signs  CARD: RRR; nl S1/S2; no M/R/G. Pulses equal bilaterally.  RESP: Respiratory rate and effort are normal; breath sounds clear and equal bilaterally.  ABD: Soft, NT ND nl bowel sounds; no masses; no rebound  MSK/EXT: No gross deformities; full range of motion.  SKIN: Warm and dry;   NEURO: AAOx3, Motor 5/5 x 4 extremities, Sensations intact to pain and palpation, Cerebellar testing normal  PSYCH: bizarre affect, t

## 2019-12-26 ENCOUNTER — TRANSCRIPTION ENCOUNTER (OUTPATIENT)
Age: 39
End: 2019-12-26

## 2019-12-26 RX ORDER — ACETAMINOPHEN 500 MG
650 TABLET ORAL ONCE
Refills: 0 | Status: COMPLETED | OUTPATIENT
Start: 2019-12-26 | End: 2019-12-26

## 2019-12-26 RX ADMIN — ENOXAPARIN SODIUM 40 MILLIGRAM(S): 100 INJECTION SUBCUTANEOUS at 22:48

## 2019-12-26 RX ADMIN — Medication 137 MICROGRAM(S): at 06:01

## 2019-12-26 RX ADMIN — Medication 1 SUPPOSITORY(S): at 16:09

## 2019-12-26 RX ADMIN — Medication 650 MILLIGRAM(S): at 01:58

## 2019-12-26 RX ADMIN — Medication 1 MILLIGRAM(S): at 06:03

## 2019-12-26 RX ADMIN — Medication 1 MILLIGRAM(S): at 10:15

## 2019-12-26 RX ADMIN — Medication 650 MILLIGRAM(S): at 02:30

## 2019-12-26 RX ADMIN — Medication 1 MILLIGRAM(S): at 21:50

## 2019-12-26 RX ADMIN — LACTULOSE 20 GRAM(S): 10 SOLUTION ORAL at 22:47

## 2019-12-26 RX ADMIN — LITHIUM CARBONATE 450 MILLIGRAM(S): 300 TABLET, EXTENDED RELEASE ORAL at 18:07

## 2019-12-26 RX ADMIN — Medication 200 MILLIGRAM(S): at 06:01

## 2019-12-26 RX ADMIN — LITHIUM CARBONATE 450 MILLIGRAM(S): 300 TABLET, EXTENDED RELEASE ORAL at 06:01

## 2019-12-26 RX ADMIN — QUETIAPINE FUMARATE 200 MILLIGRAM(S): 200 TABLET, FILM COATED ORAL at 22:46

## 2019-12-26 RX ADMIN — Medication 200 MILLIGRAM(S): at 18:07

## 2019-12-26 RX ADMIN — LATANOPROST 1 DROP(S): 0.05 SOLUTION/ DROPS OPHTHALMIC; TOPICAL at 22:47

## 2019-12-26 NOTE — DISCHARGE NOTE PROVIDER - NSDCMRMEDTOKEN_GEN_ALL_CORE_FT
Calcium 600+D 600 mg-200 intl units (5 mcg) oral tablet: 1 tab(s) orally once a day  clonazePAM 1 mg oral tablet: 1 tab(s) orally 3 times a day  docusate sodium 100 mg oral tablet: 1 tab(s) orally 2 times a day  lactulose 10 g/15 mL oral syrup: 30 milliliter(s) orally once a day (at bedtime)  latanoprost 0.005% ophthalmic solution: 1 drop(s) to each affected eye once a day (at bedtime)  levothyroxine 137 mcg (0.137 mg) oral tablet: 1 tab(s) orally once a day  lithium 450 mg oral tablet, extended release: 1 tab(s) orally 2 times a day  Omega-3 1000 mg oral capsule: 1 cap(s) orally 2 times a day  QUEtiapine 200 mg oral tablet, extended release: 1 tab(s) orally once a day (in the evening)  senna 8.6 mg oral tablet: 1 tab(s) orally once a day (at bedtime)  topiramate 200 mg oral tablet: 1 tab(s) orally 2 times a day  Vitamin D3 1000 intl units (25 mcg) oral tablet: 1 tab(s) orally once a day
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
## 2019-12-26 NOTE — DISCHARGE NOTE PROVIDER - NSDCCPCAREPLAN_GEN_ALL_CORE_FT
PRINCIPAL DISCHARGE DIAGNOSIS  Diagnosis: Adjustment disorder, unspecified type  Assessment and Plan of Treatment: Pt advised to follow up with her psychiatrist as outpatient.  Take medications as prescribed
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
## 2019-12-27 LAB
APPEARANCE UR: CLEAR — SIGNIFICANT CHANGE UP
BILIRUB UR-MCNC: NEGATIVE — SIGNIFICANT CHANGE UP
COLOR SPEC: COLORLESS — SIGNIFICANT CHANGE UP
DIFF PNL FLD: SIGNIFICANT CHANGE UP
GLUCOSE UR QL: NEGATIVE — SIGNIFICANT CHANGE UP
KETONES UR-MCNC: NEGATIVE — SIGNIFICANT CHANGE UP
LEUKOCYTE ESTERASE UR-ACNC: NEGATIVE — SIGNIFICANT CHANGE UP
NITRITE UR-MCNC: NEGATIVE — SIGNIFICANT CHANGE UP
PH UR: 7 — SIGNIFICANT CHANGE UP (ref 5–8)
PROT UR-MCNC: NEGATIVE — SIGNIFICANT CHANGE UP
SP GR SPEC: 1.01 — LOW (ref 1.01–1.02)
UROBILINOGEN FLD QL: SIGNIFICANT CHANGE UP

## 2019-12-27 PROCEDURE — 90792 PSYCH DIAG EVAL W/MED SRVCS: CPT

## 2019-12-27 PROCEDURE — 99231 SBSQ HOSP IP/OBS SF/LOW 25: CPT

## 2019-12-27 RX ORDER — HALOPERIDOL DECANOATE 100 MG/ML
5 INJECTION INTRAMUSCULAR ONCE
Refills: 0 | Status: DISCONTINUED | OUTPATIENT
Start: 2019-12-27 | End: 2019-12-27

## 2019-12-27 RX ADMIN — LITHIUM CARBONATE 450 MILLIGRAM(S): 300 TABLET, EXTENDED RELEASE ORAL at 18:00

## 2019-12-27 RX ADMIN — LACTULOSE 20 GRAM(S): 10 SOLUTION ORAL at 21:46

## 2019-12-27 RX ADMIN — Medication 137 MICROGRAM(S): at 06:24

## 2019-12-27 RX ADMIN — LATANOPROST 1 DROP(S): 0.05 SOLUTION/ DROPS OPHTHALMIC; TOPICAL at 21:46

## 2019-12-27 RX ADMIN — Medication 200 MILLIGRAM(S): at 18:00

## 2019-12-27 RX ADMIN — Medication 1 SUPPOSITORY(S): at 11:46

## 2019-12-27 RX ADMIN — ENOXAPARIN SODIUM 40 MILLIGRAM(S): 100 INJECTION SUBCUTANEOUS at 21:46

## 2019-12-27 RX ADMIN — Medication 1 MILLIGRAM(S): at 06:24

## 2019-12-27 RX ADMIN — Medication 1 MILLIGRAM(S): at 21:46

## 2019-12-27 RX ADMIN — Medication 200 MILLIGRAM(S): at 06:24

## 2019-12-27 RX ADMIN — Medication 1 MILLIGRAM(S): at 12:52

## 2019-12-27 RX ADMIN — LITHIUM CARBONATE 450 MILLIGRAM(S): 300 TABLET, EXTENDED RELEASE ORAL at 06:24

## 2019-12-27 RX ADMIN — QUETIAPINE FUMARATE 200 MILLIGRAM(S): 200 TABLET, FILM COATED ORAL at 21:46
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
## 2019-12-27 NOTE — PROGRESS NOTE BEHAVIORAL HEALTH - NSBHCHARTREVIEWLAB_PSY_A_CORE FT
12.1   10.21 )-----------( 288      ( 25 Dec 2019 18:10 )             38.7     12-25    142  |  108  |  19  ----------------------------<  103<H>  4.0   |  20  |  0.9    Ca    9.7      25 Dec 2019 18:10
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
## 2019-12-27 NOTE — PROGRESS NOTE BEHAVIORAL HEALTH - NSBHCHARTREVIEWVS_PSY_A_CORE FT
Vital Signs Last 24 Hrs  T(C): 36.2 (27 Dec 2019 13:00), Max: 36.7 (26 Dec 2019 21:00)  T(F): 97.1 (27 Dec 2019 13:00), Max: 98 (26 Dec 2019 21:00)  HR: 89 (27 Dec 2019 13:00) (56 - 89)  BP: 113/78 (27 Dec 2019 13:00) (108/67 - 136/63)  BP(mean): 92 (27 Dec 2019 13:00) (92 - 92)  RR: 20 (27 Dec 2019 13:00) (18 - 20)  SpO2: 99% (26 Dec 2019 21:00) (99% - 99%)
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
## 2019-12-28 PROCEDURE — 99231 SBSQ HOSP IP/OBS SF/LOW 25: CPT

## 2019-12-28 RX ORDER — QUETIAPINE FUMARATE 200 MG/1
50 TABLET, FILM COATED ORAL EVERY 6 HOURS
Refills: 0 | Status: DISCONTINUED | OUTPATIENT
Start: 2019-12-28 | End: 2020-01-07

## 2019-12-28 RX ORDER — ACETAMINOPHEN 500 MG
650 TABLET ORAL ONCE
Refills: 0 | Status: COMPLETED | OUTPATIENT
Start: 2019-12-28 | End: 2019-12-28

## 2019-12-28 RX ADMIN — Medication 650 MILLIGRAM(S): at 19:34

## 2019-12-28 RX ADMIN — ENOXAPARIN SODIUM 40 MILLIGRAM(S): 100 INJECTION SUBCUTANEOUS at 21:07

## 2019-12-28 RX ADMIN — Medication 1 SUPPOSITORY(S): at 15:52

## 2019-12-28 RX ADMIN — Medication 1 MILLIGRAM(S): at 21:06

## 2019-12-28 RX ADMIN — QUETIAPINE FUMARATE 50 MILLIGRAM(S): 200 TABLET, FILM COATED ORAL at 15:12

## 2019-12-28 RX ADMIN — Medication 1 MILLIGRAM(S): at 15:12

## 2019-12-28 RX ADMIN — Medication 1 MILLIGRAM(S): at 05:30

## 2019-12-28 RX ADMIN — LITHIUM CARBONATE 450 MILLIGRAM(S): 300 TABLET, EXTENDED RELEASE ORAL at 17:22

## 2019-12-28 RX ADMIN — LITHIUM CARBONATE 450 MILLIGRAM(S): 300 TABLET, EXTENDED RELEASE ORAL at 05:30

## 2019-12-28 RX ADMIN — LATANOPROST 1 DROP(S): 0.05 SOLUTION/ DROPS OPHTHALMIC; TOPICAL at 21:07

## 2019-12-28 RX ADMIN — Medication 200 MILLIGRAM(S): at 17:22

## 2019-12-28 RX ADMIN — Medication 137 MICROGRAM(S): at 05:30

## 2019-12-28 RX ADMIN — QUETIAPINE FUMARATE 200 MILLIGRAM(S): 200 TABLET, FILM COATED ORAL at 21:06

## 2019-12-28 RX ADMIN — Medication 200 MILLIGRAM(S): at 05:30

## 2019-12-28 RX ADMIN — LACTULOSE 20 GRAM(S): 10 SOLUTION ORAL at 21:06
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
## 2019-12-28 NOTE — PROGRESS NOTE ADULT - SUBJECTIVE AND OBJECTIVE BOX
Hospital Day:  3d    Subjective: Patient is a 39y old  Female who presents with a chief complaint of placement (27 Dec 2019 12:26)    Pt seen and evaluated at bedside. No over the night acute events.   yesterday pt   Past Medical Hx:   Hypothyroid  MR (mental retardation)  Bipolar disorder    Past Sx:  No significant past surgical history    Allergies:  haloperidol (Unknown)  Risperdal (Unknown)  Tegretol (Unknown)    Current Meds:   Standng Meds:  chlorhexidine 4% Liquid 1 Application(s) Topical <User Schedule>  clonazePAM  Tablet 1 milliGRAM(s) Oral three times a day  enoxaparin Injectable 40 milliGRAM(s) SubCutaneous at bedtime  hydrocortisone hemorrhoidal Suppository 1 Suppository(s) Rectal daily  lactulose Syrup 20 Gram(s) Oral at bedtime  latanoprost 0.005% Ophthalmic Solution 1 Drop(s) Both EYES at bedtime  levothyroxine 137 MICROGram(s) Oral daily  lithium CR (ESKALITH-CR) 450 milliGRAM(s) Oral two times a day  QUEtiapine 200 milliGRAM(s) Oral at bedtime  topiramate 200 milliGRAM(s) Oral two times a day    PRN Meds:  QUEtiapine 50 milliGRAM(s) Oral every 6 hours PRN Agitation    HOME MEDICATIONS:  Calcium 600+D 600 mg-200 intl units (5 mcg) oral tablet: 1 tab(s) orally once a day  clonazePAM 1 mg oral tablet: 1 tab(s) orally 3 times a day  docusate sodium 100 mg oral tablet: 1 tab(s) orally 2 times a day  lactulose 10 g/15 mL oral syrup: 30 milliliter(s) orally once a day (at bedtime)  levothyroxine 137 mcg (0.137 mg) oral tablet: 1 tab(s) orally once a day  lithium 450 mg oral tablet, extended release: 1 tab(s) orally 2 times a day  Omega-3 1000 mg oral capsule: 1 cap(s) orally 2 times a day  QUEtiapine 200 mg oral tablet, extended release: 1 tab(s) orally once a day (in the evening)  senna 8.6 mg oral tablet: 1 tab(s) orally once a day (at bedtime)  topiramate 200 mg oral tablet: 1 tab(s) orally 2 times a day  Vitamin D3 1000 intl units (25 mcg) oral tablet: 1 tab(s) orally once a day      Vital Signs:   T(F): 96.5 (19 @ 06:40), Max: 97.5 (19 @ 21:59)  HR: 75 (19 @ 06:40) (75 - 89)  BP: 133/76 (19 @ 06:40) (113/78 - 133/76)  RR: 18 (19 @ 06:40) (18 - 20)  SpO2: --        Physical Exam:   GENERAL: NAD  HEENT: NCAT  CHEST/LUNG: CTAB  HEART: Regular rate and rhythm; s1 s2 appreciated, No murmurs, rubs, or gallops  ABDOMEN: Soft, Nontender, Nondistended; Bowel sounds present  EXTREMITIES: No LE edema b/l  NERVOUS SYSTEM:  Alert & Oriented X3        Labs:                             Urinalysis Basic - ( 27 Dec 2019 00:03 )    Color: Colorless / Appearance: Clear / S.006 / pH: x  Gluc: x / Ketone: Negative  / Bili: Negative / Urobili: <2 mg/dL   Blood: x / Protein: Negative / Nitrite: Negative   Leuk Esterase: Negative / RBC: x / WBC x   Sq Epi: x / Non Sq Epi: x / Bacteria: x            Radiology:       Assessment and Plan:     DVT ppx:    GI ppx:     Code Status:     DISPO: Hospital Day:  3d    Subjective: Patient is a 39y old  Female who presents with a chief complaint of placement (27 Dec 2019 12:26)    Pt seen and evaluated at bedside. No over the night acute events.   yesterday pt had a panic attack and was reported to choking herself and saying wanted to die, s/p psych eval - no need for inpatient psych  no new complaints, on 1:1 sit     Past Medical Hx:   Hypothyroid  MR (mental retardation)  Bipolar disorder    Past Sx:  No significant past surgical history    Allergies:  haloperidol (Unknown)  Risperdal (Unknown)  Tegretol (Unknown)    Current Meds:   Standng Meds:  chlorhexidine 4% Liquid 1 Application(s) Topical <User Schedule>  clonazePAM  Tablet 1 milliGRAM(s) Oral three times a day  enoxaparin Injectable 40 milliGRAM(s) SubCutaneous at bedtime  hydrocortisone hemorrhoidal Suppository 1 Suppository(s) Rectal daily  lactulose Syrup 20 Gram(s) Oral at bedtime  latanoprost 0.005% Ophthalmic Solution 1 Drop(s) Both EYES at bedtime  levothyroxine 137 MICROGram(s) Oral daily  lithium CR (ESKALITH-CR) 450 milliGRAM(s) Oral two times a day  QUEtiapine 200 milliGRAM(s) Oral at bedtime  topiramate 200 milliGRAM(s) Oral two times a day    PRN Meds:  QUEtiapine 50 milliGRAM(s) Oral every 6 hours PRN Agitation    HOME MEDICATIONS:  Calcium 600+D 600 mg-200 intl units (5 mcg) oral tablet: 1 tab(s) orally once a day  clonazePAM 1 mg oral tablet: 1 tab(s) orally 3 times a day  docusate sodium 100 mg oral tablet: 1 tab(s) orally 2 times a day  lactulose 10 g/15 mL oral syrup: 30 milliliter(s) orally once a day (at bedtime)  levothyroxine 137 mcg (0.137 mg) oral tablet: 1 tab(s) orally once a day  lithium 450 mg oral tablet, extended release: 1 tab(s) orally 2 times a day  Omega-3 1000 mg oral capsule: 1 cap(s) orally 2 times a day  QUEtiapine 200 mg oral tablet, extended release: 1 tab(s) orally once a day (in the evening)  senna 8.6 mg oral tablet: 1 tab(s) orally once a day (at bedtime)  topiramate 200 mg oral tablet: 1 tab(s) orally 2 times a day  Vitamin D3 1000 intl units (25 mcg) oral tablet: 1 tab(s) orally once a day      Vital Signs:   T(F): 96.5 (19 @ 06:40), Max: 97.5 (19 @ 21:59)  HR: 75 (19 @ 06:40) (75 - 89)  BP: 133/76 (19 @ 06:40) (113/78 - 133/76)  RR: 18 (19 @ 06:40) (18 - 20)  SpO2: --        Physical Exam:   GENERAL: appears anxious, resting in bed.   HEENT: NCAT  CHEST/LUNG: CTAB, no wheezing or respiratory distress  HEART: Regular rate and rhythm; s1 s2 appreciated, No murmurs, rubs, or gallops  ABDOMEN: Soft, obese, Nontender, Nondistended; Bowel sounds present  EXTREMITIES: No LE edema b/l  NERVOUS SYSTEM:  Alert & Oriented X3, Pressured speech.         Labs:     Urinalysis Basic - ( 27 Dec 2019 00:03 )    Color: Colorless / Appearance: Clear / S.006 / pH: x  Gluc: x / Ketone: Negative  / Bili: Negative / Urobili: <2 mg/dL   Blood: x / Protein: Negative / Nitrite: Negative   Leuk Esterase: Negative / RBC: x / WBC x   Sq Epi: x / Non Sq Epi: x / Bacteria: x      Radiology:       Assessment and Plan:   39 years old female known to have of bipolar disorder, intellectual disability, hypothyroidism, hemorrhoids, presented to ED for placement    #Placement  - as per     #Bipolar disorder  -continue with Topamax, lithium, Seroquel, Klonopin  - as per psych - not a candidate for IPP  - lithium level subtherapeutic, psych contacted - no lithium dose adjustment  - as per psych:  STOP PRN Ativan,  Add Seroquel 50mg q6 PRN for agitation that is not redirectable with behavioral modifications.     #Hypothyroidism  -continue with levothyroxine    #DVT ppx - Lovenox  # Diet - regular  # GI ppx - not indicated  Full code
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
## 2019-12-29 PROCEDURE — 99231 SBSQ HOSP IP/OBS SF/LOW 25: CPT

## 2019-12-29 RX ORDER — ACETAMINOPHEN 500 MG
650 TABLET ORAL EVERY 6 HOURS
Refills: 0 | Status: DISCONTINUED | OUTPATIENT
Start: 2019-12-29 | End: 2020-01-07

## 2019-12-29 RX ADMIN — Medication 650 MILLIGRAM(S): at 00:56

## 2019-12-29 RX ADMIN — QUETIAPINE FUMARATE 200 MILLIGRAM(S): 200 TABLET, FILM COATED ORAL at 21:56

## 2019-12-29 RX ADMIN — LACTULOSE 20 GRAM(S): 10 SOLUTION ORAL at 21:57

## 2019-12-29 RX ADMIN — ENOXAPARIN SODIUM 40 MILLIGRAM(S): 100 INJECTION SUBCUTANEOUS at 21:56

## 2019-12-29 RX ADMIN — Medication 1 MILLIGRAM(S): at 13:29

## 2019-12-29 RX ADMIN — Medication 200 MILLIGRAM(S): at 05:27

## 2019-12-29 RX ADMIN — Medication 1 SUPPOSITORY(S): at 12:00

## 2019-12-29 RX ADMIN — Medication 1 MILLIGRAM(S): at 05:27

## 2019-12-29 RX ADMIN — LITHIUM CARBONATE 450 MILLIGRAM(S): 300 TABLET, EXTENDED RELEASE ORAL at 17:16

## 2019-12-29 RX ADMIN — Medication 200 MILLIGRAM(S): at 17:16

## 2019-12-29 RX ADMIN — Medication 137 MICROGRAM(S): at 05:27

## 2019-12-29 RX ADMIN — LATANOPROST 1 DROP(S): 0.05 SOLUTION/ DROPS OPHTHALMIC; TOPICAL at 21:57

## 2019-12-29 RX ADMIN — LITHIUM CARBONATE 450 MILLIGRAM(S): 300 TABLET, EXTENDED RELEASE ORAL at 05:27

## 2019-12-29 RX ADMIN — Medication 1 MILLIGRAM(S): at 21:56
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
## 2019-12-30 PROCEDURE — 99231 SBSQ HOSP IP/OBS SF/LOW 25: CPT

## 2019-12-30 RX ADMIN — LITHIUM CARBONATE 450 MILLIGRAM(S): 300 TABLET, EXTENDED RELEASE ORAL at 17:21

## 2019-12-30 RX ADMIN — Medication 1 MILLIGRAM(S): at 13:03

## 2019-12-30 RX ADMIN — Medication 200 MILLIGRAM(S): at 05:55

## 2019-12-30 RX ADMIN — Medication 650 MILLIGRAM(S): at 14:05

## 2019-12-30 RX ADMIN — Medication 650 MILLIGRAM(S): at 13:34

## 2019-12-30 RX ADMIN — Medication 1 MILLIGRAM(S): at 05:55

## 2019-12-30 RX ADMIN — LATANOPROST 1 DROP(S): 0.05 SOLUTION/ DROPS OPHTHALMIC; TOPICAL at 21:49

## 2019-12-30 RX ADMIN — Medication 200 MILLIGRAM(S): at 17:21

## 2019-12-30 RX ADMIN — Medication 1 SUPPOSITORY(S): at 11:48

## 2019-12-30 RX ADMIN — LACTULOSE 20 GRAM(S): 10 SOLUTION ORAL at 21:50

## 2019-12-30 RX ADMIN — Medication 650 MILLIGRAM(S): at 21:52

## 2019-12-30 RX ADMIN — LITHIUM CARBONATE 450 MILLIGRAM(S): 300 TABLET, EXTENDED RELEASE ORAL at 05:55

## 2019-12-30 RX ADMIN — QUETIAPINE FUMARATE 50 MILLIGRAM(S): 200 TABLET, FILM COATED ORAL at 13:46

## 2019-12-30 RX ADMIN — Medication 137 MICROGRAM(S): at 05:55

## 2019-12-30 RX ADMIN — Medication 1 MILLIGRAM(S): at 21:52

## 2019-12-30 RX ADMIN — QUETIAPINE FUMARATE 200 MILLIGRAM(S): 200 TABLET, FILM COATED ORAL at 21:50

## 2019-12-30 RX ADMIN — ENOXAPARIN SODIUM 40 MILLIGRAM(S): 100 INJECTION SUBCUTANEOUS at 21:50
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
## 2019-12-30 NOTE — PROGRESS NOTE ADULT - ASSESSMENT
39 years old female known to have of bipolar disorder, intellectual disability, hypothyroidism, hemorrhoids, presented to ED for placement    #Placement  - as per - Initially we were considering that pt may need to be transferred to [facility] facility, however psych has seen and recommended that Pt to return to group home and eventually would get moved to new group home from there.  But pt currently refusing to return to her group home.  will need chk w Soc work/CSM on Monday as pt continues to refuse to go back to grp home    #Bipolar disorder  -continue with Topamax, lithium, Seroquel, Klonopin  - as per psych - not a candidate for IPP  - lithium level subtherapeutic, psych contacted - no lithium dose adjustment  - as per psych:  STOP PRN Ativan,  Add Seroquel 50mg q6 PRN for agitation that is not redirectable with behavioral modifications.   - currently on 1:1 sits    #Hypothyroidism  -continue with levothyroxine    #DVT ppx - Lovenox  # Diet - regular  # GI ppx - not indicated  Full code

## 2019-12-31 PROCEDURE — 99231 SBSQ HOSP IP/OBS SF/LOW 25: CPT

## 2019-12-31 RX ADMIN — LATANOPROST 1 DROP(S): 0.05 SOLUTION/ DROPS OPHTHALMIC; TOPICAL at 21:09

## 2019-12-31 RX ADMIN — Medication 1 MILLIGRAM(S): at 05:20

## 2019-12-31 RX ADMIN — Medication 3 MILLIGRAM(S): at 10:55

## 2019-12-31 RX ADMIN — QUETIAPINE FUMARATE 200 MILLIGRAM(S): 200 TABLET, FILM COATED ORAL at 21:09

## 2019-12-31 RX ADMIN — ENOXAPARIN SODIUM 40 MILLIGRAM(S): 100 INJECTION SUBCUTANEOUS at 21:09

## 2019-12-31 RX ADMIN — Medication 1 MILLIGRAM(S): at 21:10

## 2019-12-31 RX ADMIN — Medication 1 MILLIGRAM(S): at 13:35

## 2019-12-31 RX ADMIN — LACTULOSE 20 GRAM(S): 10 SOLUTION ORAL at 21:09

## 2019-12-31 RX ADMIN — Medication 200 MILLIGRAM(S): at 05:20

## 2019-12-31 RX ADMIN — LITHIUM CARBONATE 450 MILLIGRAM(S): 300 TABLET, EXTENDED RELEASE ORAL at 17:50

## 2019-12-31 RX ADMIN — Medication 650 MILLIGRAM(S): at 05:21

## 2019-12-31 RX ADMIN — Medication 137 MICROGRAM(S): at 05:20

## 2019-12-31 RX ADMIN — Medication 650 MILLIGRAM(S): at 15:27

## 2019-12-31 RX ADMIN — LITHIUM CARBONATE 450 MILLIGRAM(S): 300 TABLET, EXTENDED RELEASE ORAL at 05:20

## 2019-12-31 RX ADMIN — Medication 200 MILLIGRAM(S): at 17:50
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
## 2019-12-31 NOTE — PROGRESS NOTE ADULT - ASSESSMENT
39 years old female known to have of bipolar disorder, intellectual disability, hypothyroidism, hemorrhoids, presented to ED for placement    #Placement  - as per - Initially we were considering that pt may need to be transferred to [place] [facility] facility, however psych has seen and recommended that Pt to return to group home and eventually would get moved to new group home from there.  But pt currently refusing to return to her group home.  will need chk w Soc work/CSM on Monday as pt continues to refuse to go back to grp home    #Bipolar disorder  -continue with Topamax, lithium, Seroquel, Klonopin  - as per psych - not a candidate for IPP  - lithium level subtherapeutic, psych contacted - no lithium dose adjustment  - as per psych:  STOP PRN Ativan,  Add Seroquel 50mg q6 PRN for agitation that is not redirectable with behavioral modifications.   - currently on 1:1 sits    #Hypothyroidism  -continue with levothyroxine    #DVT ppx - Lovenox  # Diet - regular  # GI ppx - not indicated  Full code
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
## 2020-01-01 PROCEDURE — 99232 SBSQ HOSP IP/OBS MODERATE 35: CPT

## 2020-01-01 RX ADMIN — LITHIUM CARBONATE 450 MILLIGRAM(S): 300 TABLET, EXTENDED RELEASE ORAL at 05:18

## 2020-01-01 RX ADMIN — Medication 650 MILLIGRAM(S): at 01:03

## 2020-01-01 RX ADMIN — Medication 200 MILLIGRAM(S): at 17:22

## 2020-01-01 RX ADMIN — Medication 1 MILLIGRAM(S): at 05:18

## 2020-01-01 RX ADMIN — LITHIUM CARBONATE 450 MILLIGRAM(S): 300 TABLET, EXTENDED RELEASE ORAL at 17:22

## 2020-01-01 RX ADMIN — Medication 650 MILLIGRAM(S): at 23:05

## 2020-01-01 RX ADMIN — Medication 650 MILLIGRAM(S): at 01:30

## 2020-01-01 RX ADMIN — LACTULOSE 20 GRAM(S): 10 SOLUTION ORAL at 21:43

## 2020-01-01 RX ADMIN — ENOXAPARIN SODIUM 40 MILLIGRAM(S): 100 INJECTION SUBCUTANEOUS at 21:43

## 2020-01-01 RX ADMIN — Medication 1 MILLIGRAM(S): at 14:30

## 2020-01-01 RX ADMIN — Medication 1 MILLIGRAM(S): at 21:43

## 2020-01-01 RX ADMIN — LATANOPROST 1 DROP(S): 0.05 SOLUTION/ DROPS OPHTHALMIC; TOPICAL at 21:43

## 2020-01-01 RX ADMIN — QUETIAPINE FUMARATE 200 MILLIGRAM(S): 200 TABLET, FILM COATED ORAL at 21:43

## 2020-01-01 RX ADMIN — Medication 200 MILLIGRAM(S): at 06:29

## 2020-01-01 RX ADMIN — Medication 137 MICROGRAM(S): at 05:18

## 2020-01-01 NOTE — DIETITIAN INITIAL EVALUATION ADULT. - FACTORS AFF FOOD INTAKE
Pt reports good appetite. Consuming >75% of meal trays, confirmed by EMR. Follows regular diet. No issues chewing/swallowing. NKFA. No nutrition supplement use PTA. LBM 12/29. Pt on constant observation.

## 2020-01-01 NOTE — DIETITIAN INITIAL EVALUATION ADULT. - ENERGY NEEDS
calorie: 1924 - 2116 kcals/day (MSJ x 1.0 - 1.1 for obesity)  protein: 60 - 70 gms/day (1.2 - 1.4 gm/kg IBW for obesity)  fluid: 1ml/kcal or per LIP

## 2020-01-01 NOTE — PROGRESS NOTE ADULT - SUBJECTIVE AND OBJECTIVE BOX
LEO LADD  39y  [facility] [place] [address] [address]      Patient is a 39y old  Female who presents with a chief complaint of placement (31 Dec 2019 14:16)      INTERVAL HPI/OVERNIGHT EVENTS:    no acute events overnight     REVIEW OF SYSTEMS:  CONSTITUTIONAL: No fever, weight loss, or fatigue  EYES: No eye pain, visual disturbances, or discharge  ENMT:  No difficulty hearing, tinnitus, vertigo; No sinus or throat pain  NECK: No pain or stiffness  BREASTS: No pain, masses, or nipple discharge  RESPIRATORY: No cough, wheezing, chills or hemoptysis; No shortness of breath  CARDIOVASCULAR: No chest pain, palpitations, dizziness, or leg swelling  GASTROINTESTINAL: No abdominal or epigastric pain. No nausea, vomiting, or hematemesis; No diarrhea or constipation. No melena or hematochezia.  GENITOURINARY: No dysuria, frequency, hematuria, or incontinence  NEUROLOGICAL: No headaches, memory loss, loss of strength, numbness, or tremors  SKIN: No itching, burning, rashes, or lesions   LYMPH NODES: No enlarged glands  ENDOCRINE: No heat or cold intolerance; No hair loss  MUSCULOSKELETAL endorsing occasional left knee pain  PSYCHIATRIC: No depression, anxiety, mood swings, or difficulty sleeping  HEME/LYMPH: No easy bruising, or bleeding gums  ALLERY AND IMMUNOLOGIC: No hives or eczema  FAMILY HISTORY:  No pertinent family history in first degree relatives    T(C): 37.1 (01-01-20 @ 06:39), Max: 37.1 (01-01-20 @ 06:39)  HR: 77 (01-01-20 @ 06:39) (77 - 81)  BP: 104/58 (01-01-20 @ 06:39) (104/58 - 109/56)  RR: 18 (01-01-20 @ 06:39) (18 - 18)  SpO2: --  Wt(kg): --Vital Signs Last 24 Hrs  T(C): 37.1 (01 Jan 2020 06:39), Max: 37.1 (01 Jan 2020 06:39)  T(F): 98.7 (01 Jan 2020 06:39), Max: 98.7 (01 Jan 2020 06:39)  HR: 77 (01 Jan 2020 06:39) (77 - 81)  BP: 104/58 (01 Jan 2020 06:39) (104/58 - 109/56)  BP(mean): --  RR: 18 (01 Jan 2020 06:39) (18 - 18)  SpO2: --    PHYSICAL EXAM:  GENERAL: NAD,   HEAD:  Atraumatic, Normocephalic  EYES: EOMI, PERRLA, conjunctiva and sclera clear  ENMT: No tonsillar erythema, exudates, or enlargement; \  NECK: Supple, No JVD, Normal thyroid  PULM: Clear to auscultation bilaterally  CARDIAC: Regular rate and rhythm; No murmurs, rubs, or gallops  GI: Soft, Nontender, Nondistended; Bowel sounds present  EXTREMITIES:  2+ Peripheral Pulses, No clubbing, cyanosis, or edema, no crepitus  LYMPH: No lymphadenopathy noted  SKIN: No rashes or lesions    Consultant(s) Notes Reviewed:  [x ] YES  [ ] NO  Care Discussed with Consultants/Other Providers [ x] YES  [ ] NO    LABS:                    acetaminophen   Tablet .. 650 milliGRAM(s) Oral every 6 hours PRN  chlorhexidine 4% Liquid 1 Application(s) Topical <User Schedule>  clonazePAM  Tablet 1 milliGRAM(s) Oral three times a day  enoxaparin Injectable 40 milliGRAM(s) SubCutaneous at bedtime  hydrocortisone hemorrhoidal Suppository 1 Suppository(s) Rectal daily  lactulose Syrup 20 Gram(s) Oral at bedtime  latanoprost 0.005% Ophthalmic Solution 1 Drop(s) Both EYES at bedtime  levothyroxine 137 MICROGram(s) Oral daily  lithium CR (ESKALITH-CR) 450 milliGRAM(s) Oral two times a day  LORazepam   Injectable 2 milliGRAM(s) IntraMuscular daily PRN  QUEtiapine 50 milliGRAM(s) Oral every 6 hours PRN  QUEtiapine 200 milliGRAM(s) Oral at bedtime  topiramate 200 milliGRAM(s) Oral two times a day      HEALTH ISSUES - PROBLEM Dx:  Adjustment disorder, unspecified type          Case Discussed with House Staff      Spectra [phone]

## 2020-01-01 NOTE — DIETITIAN INITIAL EVALUATION ADULT. - PHYSICAL APPEARANCE
obese/BMI 52.4  IBW: 50 kg Denies changes in weight recently. BS 22 skin intact. No physical signs of malnutrition

## 2020-01-01 NOTE — PROGRESS NOTE ADULT - ASSESSMENT
Patient is a 39y old  Female who presents with a chief complaint of placement (31 Dec 2019 14:16)    #Intellectual disability with bipolar disorder complicated by frequent emotional outpburst  medications adjusted by psychiatry   currently stable on medications and 1:1     #Obesity Morbid BMI (kg/m2): 52.4 (26 Dec 2019 22:15) patient needs to see dieitian outpatient for further evaluation     #Suspect osteoarthritis of left knee due to body habitus - recommend weight loss and physical therapy     #Hypothyroidism on synthroid    Progress Note Handoff    Pending:  Placement   Disposition: Group home

## 2020-01-02 ENCOUNTER — TRANSCRIPTION ENCOUNTER (OUTPATIENT)
Age: 40
End: 2020-01-02

## 2020-01-02 PROCEDURE — 99231 SBSQ HOSP IP/OBS SF/LOW 25: CPT

## 2020-01-02 RX ORDER — QUETIAPINE FUMARATE 200 MG/1
100 TABLET, FILM COATED ORAL ONCE
Refills: 0 | Status: COMPLETED | OUTPATIENT
Start: 2020-01-02 | End: 2020-01-02

## 2020-01-02 RX ADMIN — CHLORHEXIDINE GLUCONATE 1 APPLICATION(S): 213 SOLUTION TOPICAL at 05:41

## 2020-01-02 RX ADMIN — Medication 1 MILLIGRAM(S): at 14:15

## 2020-01-02 RX ADMIN — LITHIUM CARBONATE 450 MILLIGRAM(S): 300 TABLET, EXTENDED RELEASE ORAL at 05:41

## 2020-01-02 RX ADMIN — LITHIUM CARBONATE 450 MILLIGRAM(S): 300 TABLET, EXTENDED RELEASE ORAL at 17:10

## 2020-01-02 RX ADMIN — Medication 137 MICROGRAM(S): at 05:41

## 2020-01-02 RX ADMIN — Medication 1 MILLIGRAM(S): at 05:41

## 2020-01-02 RX ADMIN — QUETIAPINE FUMARATE 200 MILLIGRAM(S): 200 TABLET, FILM COATED ORAL at 22:23

## 2020-01-02 RX ADMIN — ENOXAPARIN SODIUM 40 MILLIGRAM(S): 100 INJECTION SUBCUTANEOUS at 22:24

## 2020-01-02 RX ADMIN — Medication 1 SUPPOSITORY(S): at 12:02

## 2020-01-02 RX ADMIN — Medication 1 MILLIGRAM(S): at 22:23

## 2020-01-02 RX ADMIN — Medication 2 MILLIGRAM(S): at 13:24

## 2020-01-02 RX ADMIN — LACTULOSE 20 GRAM(S): 10 SOLUTION ORAL at 22:23

## 2020-01-02 RX ADMIN — Medication 200 MILLIGRAM(S): at 17:10

## 2020-01-02 RX ADMIN — LATANOPROST 1 DROP(S): 0.05 SOLUTION/ DROPS OPHTHALMIC; TOPICAL at 22:23

## 2020-01-02 RX ADMIN — Medication 650 MILLIGRAM(S): at 23:28

## 2020-01-02 RX ADMIN — Medication 200 MILLIGRAM(S): at 05:41

## 2020-01-02 RX ADMIN — QUETIAPINE FUMARATE 100 MILLIGRAM(S): 200 TABLET, FILM COATED ORAL at 17:10

## 2020-01-02 NOTE — PROGRESS NOTE ADULT - SUBJECTIVE AND OBJECTIVE BOX
LEO LADD  39y  [facility] [address] [address]      Patient is a 39y old  Female who presents with a chief complaint of placement (02 Jan 2020 11:40)      INTERVAL HPI/OVERNIGHT EVENTS:    no acute overnight events     REVIEW OF SYSTEMS:  CONSTITUTIONAL: No fever, weight loss, or fatigue  EYES: No eye pain, visual disturbances, or discharge  ENMT:  No difficulty hearing, tinnitus, vertigo; No sinus or throat pain  NECK: No pain or stiffness  BREASTS: No pain, masses, or nipple discharge  RESPIRATORY: No cough, wheezing, chills or hemoptysis; No shortness of breath  CARDIOVASCULAR: No chest pain, palpitations, dizziness, or leg swelling  GASTROINTESTINAL: No abdominal or epigastric pain. No nausea, vomiting, or hematemesis; No diarrhea or constipation. No melena or hematochezia.  GENITOURINARY: No dysuria, frequency, hematuria, or incontinence  NEUROLOGICAL: No headaches, memory loss, loss of strength, numbness, or tremors  SKIN: No itching, burning, rashes, or lesions   LYMPH NODES: No enlarged glands  ENDOCRINE: No heat or cold intolerance; No hair loss  MUSCULOSKELETAL: No joint pain or swelling; No muscle, back, or extremity pain  PSYCHIATRIC: No depression, anxiety, mood swings, or difficulty sleeping  HEME/LYMPH: No easy bruising, or bleeding gums  ALLERY AND IMMUNOLOGIC: No hives or eczema  FAMILY HISTORY:  No pertinent family history in first degree relatives    T(C): 36.2 (01-02-20 @ 05:47), Max: 36.4 (01-01-20 @ 14:39)  HR: 72 (01-02-20 @ 05:47) (72 - 91)  BP: 116/75 (01-02-20 @ 05:47) (102/51 - 129/56)  RR: 18 (01-02-20 @ 05:47) (18 - 18)  SpO2: 98% (01-02-20 @ 08:20) (98% - 98%)  Wt(kg): --Vital Signs Last 24 Hrs  T(C): 36.2 (02 Jan 2020 05:47), Max: 36.4 (01 Jan 2020 14:39)  T(F): 97.2 (02 Jan 2020 05:47), Max: 97.5 (01 Jan 2020 14:39)  HR: 72 (02 Jan 2020 05:47) (72 - 91)  BP: 116/75 (02 Jan 2020 05:47) (102/51 - 129/56)  BP(mean): --  RR: 18 (02 Jan 2020 05:47) (18 - 18)  SpO2: 98% (02 Jan 2020 08:20) (98% - 98%)    PHYSICAL EXAM:  GENERAL: NAD, well-groomed, well-developed  HEAD:  Atraumatic, Normocephalic  EYES: EOMI, PERRLA, conjunctiva and sclera clear  ENMT: No tonsillar erythema, exudates, or enlargement; Moist mucous membranes  NECK: Supple, No JVD, Normal thyroid  NERVOUS SYSTEM:  Alert & Oriented X3,   PULM: Clear to auscultation bilaterally  CARDIAC: Regular rate and rhythm; No murmurs, rubs, or gallops  GI: Soft, Nontender, Nondistended; Bowel sounds present obese   EXTREMITIES:  2+ Peripheral Pulses, No clubbing, cyanosis, or edema  LYMPH: No lymphadenopathy noted  SKIN: No rashes or lesions      LABS:        acetaminophen   Tablet .. 650 milliGRAM(s) Oral every 6 hours PRN  chlorhexidine 4% Liquid 1 Application(s) Topical <User Schedule>  clonazePAM  Tablet 1 milliGRAM(s) Oral three times a day  enoxaparin Injectable 40 milliGRAM(s) SubCutaneous at bedtime  hydrocortisone hemorrhoidal Suppository 1 Suppository(s) Rectal daily  lactulose Syrup 20 Gram(s) Oral at bedtime  latanoprost 0.005% Ophthalmic Solution 1 Drop(s) Both EYES at bedtime  levothyroxine 137 MICROGram(s) Oral daily  lithium CR (ESKALITH-CR) 450 milliGRAM(s) Oral two times a day  LORazepam   Injectable 2 milliGRAM(s) IntraMuscular daily PRN  QUEtiapine 50 milliGRAM(s) Oral every 6 hours PRN  QUEtiapine 200 milliGRAM(s) Oral at bedtime  topiramate 200 milliGRAM(s) Oral two times a day      HEALTH ISSUES - PROBLEM Dx:  Adjustment disorder, unspecified type          Case Discussed with House Staff     Spectra [phone]

## 2020-01-02 NOTE — PROGRESS NOTE ADULT - ASSESSMENT
39 years old female known to have of bipolar disorder, intellectual disability, hypothyroidism, hemorrhoids, presented to ED for placement    #Placement  - as per - Initially we were considering that pt may need to be transferred to [place] [facility] facility, however psych has seen and recommended that Pt to return to group home and eventually would get moved to new group home from there.  But pt currently refusing to return to her group home.  will need chk w Soc work/CSM on Monday as pt continues to refuse to go back to grp home    #Bipolar disorder  -continue with Topamax, lithium, Seroquel, Klonopin  - as per psych - not a candidate for IPP  - lithium level subtherapeutic, psych contacted - no lithium dose adjustment  - as per psych:  STOP PRN Ativan,  Add Seroquel 50mg q6 PRN for agitation that is not redirectable with behavioral modifications.   - currently on 1:1 sits    #Hypothyroidism  -continue with levothyroxine    #DVT ppx - Lovenox  # Diet - regular  # GI ppx - not indicated  Full code

## 2020-01-02 NOTE — DISCHARGE NOTE NURSING/CASE MANAGEMENT/SOCIAL WORK - PATIENT PORTAL LINK FT
You can access the FollowMyHealth Patient Portal offered by [facility] by registering at the following website: [email or web address]. By joining [employer]’s FollowMyHealth portal, you will also be able to view your health information using other applications (apps) compatible with our system.

## 2020-01-02 NOTE — PROGRESS NOTE ADULT - ASSESSMENT
Patient is a 39y old  Female who presents with a chief complaint of placement (31 Dec 2019 14:16)    #Intellectual disability with bipolar disorder complicated by frequent emotional outpburst  medications adjusted by psychiatry   currently stable on medications and 1:1     #Obesity Morbid BMI (kg/m2): 52.4 (26 Dec 2019 22:15) patient needs to see dieitian outpatient for further evaluation     #Suspect osteoarthritis of left knee due to body habitus - pt and weight loss, if progresses may need ortho eval    #Hypothyroidism on synthroid    Progress Note Handoff    Pending:  Placement , there is social issue in regards to placement regarding safety of disposition , social work and case management aware  Disposition: Group home

## 2020-01-02 NOTE — CHART NOTE - NSCHARTNOTEFT_GEN_A_CORE
Stat consult placed as patient made suicidal comments after being told she was being discharged back to her group home. Case was discussed with medicine team, who reported that patient was agitated earlier in the context of not wanting to return to group home. As per chart review, patient has frequent emotional outbursts due to her intellectual limitation and has also made suicidal comments in the past again in the context of not wanting to return back to group home. Writer went to the floor to evaluate patient, at which time she was calmly sleeping. Medical team confirmed that her discharge was delayed till tomorrow, recommended recalling c/l team PRN as waking up the patient at this time may make her more aggravated and there is no indication for urgency as she is calmly sleeping, with 1:1 bedside and will be staying over night. Medical team denied any acute concerns  with plan.

## 2020-01-02 NOTE — PROGRESS NOTE ADULT - SUBJECTIVE AND OBJECTIVE BOX
SUBJECTIVE:    Patient is a 39y old Female who presents with a chief complaint of placement (01 Jan 2020 12:19)    Currently admitted to medicine with the primary diagnosis of Adjustment disorder, unspecified type     Today is hospital day 8d. This morning she is resting comfortably in bed and reports no new issues or overnight events.     PAST MEDICAL & SURGICAL HISTORY  Hypothyroid  MR (mental retardation): mild  Bipolar disorder  No significant past surgical history    SOCIAL HISTORY:  Negative for smoking/alcohol/drug use.     ALLERGIES:  haloperidol (Unknown)  Risperdal (Unknown)  Tegretol (Unknown)    MEDICATIONS:  STANDING MEDICATIONS  chlorhexidine 4% Liquid 1 Application(s) Topical <User Schedule>  clonazePAM  Tablet 1 milliGRAM(s) Oral three times a day  enoxaparin Injectable 40 milliGRAM(s) SubCutaneous at bedtime  hydrocortisone hemorrhoidal Suppository 1 Suppository(s) Rectal daily  lactulose Syrup 20 Gram(s) Oral at bedtime  latanoprost 0.005% Ophthalmic Solution 1 Drop(s) Both EYES at bedtime  levothyroxine 137 MICROGram(s) Oral daily  lithium CR (ESKALITH-CR) 450 milliGRAM(s) Oral two times a day  QUEtiapine 200 milliGRAM(s) Oral at bedtime  topiramate 200 milliGRAM(s) Oral two times a day    PRN MEDICATIONS  acetaminophen   Tablet .. 650 milliGRAM(s) Oral every 6 hours PRN  LORazepam   Injectable 2 milliGRAM(s) IntraMuscular daily PRN  QUEtiapine 50 milliGRAM(s) Oral every 6 hours PRN    VITALS:   T(F): 97.2  HR: 72  BP: 116/75  RR: 18  SpO2: 98%    LABS:                        RADIOLOGY:  no radiology in past 24 hours  PHYSICAL EXAM:  ENERAL: NAD,   HEAD:  Atraumatic, Normocephalic  EYES: EOMI, PERRLA, conjunctiva and sclera clear  ENMT: No tonsillar erythema, exudates, or enlargement; \  NECK: Supple, No JVD, Normal thyroid  PULM: Clear to auscultation bilaterally  CARDIAC: Regular rate and rhythm; No murmurs, rubs, or gallops  GI: Soft, Nontender, Nondistended; Bowel sounds present  EXTREMITIES:  2+ Peripheral Pulses, No clubbing, cyanosis, or edema, no crepitus  LYMPH: No lymphadenopathy noted  SKIN: No rashes or lesions

## 2020-01-03 PROCEDURE — 99231 SBSQ HOSP IP/OBS SF/LOW 25: CPT

## 2020-01-03 PROCEDURE — 99233 SBSQ HOSP IP/OBS HIGH 50: CPT

## 2020-01-03 RX ORDER — CLONAZEPAM 1 MG
1 TABLET ORAL ONCE
Refills: 0 | Status: DISCONTINUED | OUTPATIENT
Start: 2020-01-03 | End: 2020-01-03

## 2020-01-03 RX ORDER — CLONAZEPAM 1 MG
1 TABLET ORAL THREE TIMES A DAY
Refills: 0 | Status: DISCONTINUED | OUTPATIENT
Start: 2020-01-03 | End: 2020-01-03

## 2020-01-03 RX ADMIN — Medication 1 MILLIGRAM(S): at 10:28

## 2020-01-03 RX ADMIN — Medication 200 MILLIGRAM(S): at 06:52

## 2020-01-03 RX ADMIN — Medication 200 MILLIGRAM(S): at 17:12

## 2020-01-03 RX ADMIN — LATANOPROST 1 DROP(S): 0.05 SOLUTION/ DROPS OPHTHALMIC; TOPICAL at 22:08

## 2020-01-03 RX ADMIN — LITHIUM CARBONATE 450 MILLIGRAM(S): 300 TABLET, EXTENDED RELEASE ORAL at 17:12

## 2020-01-03 RX ADMIN — Medication 1 SUPPOSITORY(S): at 12:27

## 2020-01-03 RX ADMIN — QUETIAPINE FUMARATE 200 MILLIGRAM(S): 200 TABLET, FILM COATED ORAL at 22:08

## 2020-01-03 RX ADMIN — Medication 650 MILLIGRAM(S): at 23:47

## 2020-01-03 RX ADMIN — LACTULOSE 20 GRAM(S): 10 SOLUTION ORAL at 22:09

## 2020-01-03 RX ADMIN — LITHIUM CARBONATE 450 MILLIGRAM(S): 300 TABLET, EXTENDED RELEASE ORAL at 06:52

## 2020-01-03 RX ADMIN — Medication 137 MICROGRAM(S): at 06:52

## 2020-01-03 RX ADMIN — ENOXAPARIN SODIUM 40 MILLIGRAM(S): 100 INJECTION SUBCUTANEOUS at 22:08
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
## 2020-01-03 NOTE — PROGRESS NOTE BEHAVIORAL HEALTH - NSBHADMITCOUNSEL_PSY_A_CORE
risk factor reduction/diagnostic results/impressions and/or recommended studies
diagnostic results/impressions and/or recommended studies/client/family/caregiver education/risk factor reduction/instructions for management, treatment and follow up
diagnostic results/impressions and/or recommended studies/instructions for management, treatment and follow up/risk factor reduction

## 2020-01-03 NOTE — PROGRESS NOTE BEHAVIORAL HEALTH - NSBHFUPINTERVALHXFT_PSY_A_CORE
Pt was reported threatening that she would kill herself if she were to be sent back to her prior residence. She said the team told her to look for new residence for her. She was too scared to return to her old place.    Pt said she felt safe on the unit and the team has taken good care of her. She did not mean to kill herself on the unit. SHe said her claim was just her desperate cry for help.  No self-harming behaviors displayed at this assessment.    Continue her current medication regimen. Seroquel 50mg po PRN q6hr for agitation.
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
## 2020-01-03 NOTE — PROGRESS NOTE ADULT - SUBJECTIVE AND OBJECTIVE BOX
LEO LADD  39y  [facility] A      Patient is a 39y old  Female who presents with a chief complaint of placement (03 Jan 2020 08:08)      INTERVAL HPI/OVERNIGHT EVENTS:    Last night refused to go to group home and threatned to hurt herself     REVIEW OF SYSTEMS:  CONSTITUTIONAL: No fever, weight loss, or fatigue  EYES: No eye pain, visual disturbances, or discharge  ENMT:  No difficulty hearing, tinnitus, vertigo; No sinus or throat pain  NECK: No pain or stiffness  BREASTS: No pain, masses, or nipple discharge  RESPIRATORY: No cough, wheezing, chills or hemoptysis; No shortness of breath  CARDIOVASCULAR: No chest pain, palpitations, dizziness, or leg swelling  GASTROINTESTINAL: No abdominal or epigastric pain. No nausea, vomiting, or hematemesis; No diarrhea or constipation. No melena or hematochezia.  GENITOURINARY: No dysuria, frequency, hematuria, or incontinence  NEUROLOGICAL: No headaches, memory loss, loss of strength, numbness, or tremors  SKIN: No itching, burning, rashes, or lesions   LYMPH NODES: No enlarged glands  ENDOCRINE: No heat or cold intolerance; No hair loss  MUSCULOSKELETAL: No joint pain or swelling; No muscle, back, or extremity pain  PSYCHIATRIC: +Anxiety  HEME/LYMPH: No easy bruising, or bleeding gums  ALLERY AND IMMUNOLOGIC: No hives or eczema  FAMILY HISTORY:  No pertinent family history in first degree relatives    T(C): 36.3 (01-03-20 @ 06:50), Max: 36.7 (01-02-20 @ 14:36)  HR: 77 (01-03-20 @ 06:50) (77 - 77)  BP: 95/55 (01-03-20 @ 06:50) (95/55 - 117/72)  RR: 18 (01-03-20 @ 06:50) (18 - 19)  SpO2: --  Wt(kg): --Vital Signs Last 24 Hrs  T(C): 36.3 (03 Jan 2020 06:50), Max: 36.7 (02 Jan 2020 14:36)  T(F): 97.3 (03 Jan 2020 06:50), Max: 98.1 (02 Jan 2020 14:36)  HR: 77 (03 Jan 2020 06:50) (77 - 77)  BP: 95/55 (03 Jan 2020 06:50) (95/55 - 117/72)  BP(mean): --  RR: 18 (03 Jan 2020 06:50) (18 - 19)  SpO2: --    PHYSICAL EXAM:  GEN Lying in no acute distress  HEENT Pupils equal and reactive to light and accommodationSupple Neck  PULM Clear to auscultation bilaterally  CV s1s2 regular rate and rhythm  GI + bowel sounds nontnender  EXT no cyanosis or edema  INTEG No Lesions  NEURO SCHAFFER        acetaminophen   Tablet .. 650 milliGRAM(s) Oral every 6 hours PRN  chlorhexidine 4% Liquid 1 Application(s) Topical <User Schedule>  enoxaparin Injectable 40 milliGRAM(s) SubCutaneous at bedtime  hydrocortisone hemorrhoidal Suppository 1 Suppository(s) Rectal daily  lactulose Syrup 20 Gram(s) Oral at bedtime  latanoprost 0.005% Ophthalmic Solution 1 Drop(s) Both EYES at bedtime  levothyroxine 137 MICROGram(s) Oral daily  lithium CR (ESKALITH-CR) 450 milliGRAM(s) Oral two times a day  LORazepam   Injectable 2 milliGRAM(s) IntraMuscular daily PRN  QUEtiapine 50 milliGRAM(s) Oral every 6 hours PRN  QUEtiapine 200 milliGRAM(s) Oral at bedtime  topiramate 200 milliGRAM(s) Oral two times a day      HEALTH ISSUES - PROBLEM Dx:  Adjustment disorder, unspecified type          Case Discussed with House Staff   45 minutes spent on total encounter; more than 50% of the visit was spent counseling and/or coordinating care by the attending physician.   Spectra [phone]

## 2020-01-03 NOTE — PROGRESS NOTE ADULT - ASSESSMENT
39 years old female known to have of bipolar disorder, intellectual disability, hypothyroidism, hemorrhoids, presented to ED for placement    #Placement  - as per - Initially we were considering that pt may need to be transferred to [place] [facility] facility, however psych has seen and recommended that Pt to return to group home and eventually would get moved to new group home from there.  But pt currently refusing to return to her group home.  will need chk w Soc work/CSM on Monday as pt continues to refuse to go back to grp home  - pt was prepared to be discharged on 1/3/20, at time of discharge got aggravated and threatened to commit suicide. Discharge delayed for now.    #Bipolar disorder  -continue with Topamax, lithium, Seroquel, Klonopin  - as per psych - not a candidate for IPP  - lithium level subtherapeutic, psych contacted - no lithium dose adjustment  - as per psych:  STOP PRN Ativan,  Add Seroquel 50mg q6 PRN for agitation that is not redirectable with behavioral modifications.   - currently on 1:1 sits    #Hypothyroidism  -continue with levothyroxine    #DVT ppx - Lovenox  # Diet - regular  # GI ppx - not indicated  Full code 39 years old female known to have of bipolar disorder, intellectual disability, hypothyroidism, hemorrhoids, presented to ED for placement    #Placement  - as per - Initially we were considering that pt may need to be transferred to [place] [facility] facility, however psych has seen and recommended that Pt to return to group home and eventually would get moved to new group home from there.  But pt currently refusing to return to her group home.  will need chk w Soc work/CSM on Monday as pt continues to refuse to go back to grp home  - pt was prepared to be discharged on 1/3/20, at time of discharge got aggravated and threatened to commit suicide. Discharge delayed for now.  - c/s psych for in patient psych, will follow up    #Bipolar disorder  -continue with Topamax, lithium, Seroquel, Klonopin  - as per psych - not a candidate for IPP  - lithium level subtherapeutic, psych contacted - no lithium dose adjustment  - as per psych:  STOP PRN Ativan,  Add Seroquel 50mg q6 PRN for agitation that is not redirectable with behavioral modifications.   - currently on 1:1 sits    #Hypothyroidism  -continue with levothyroxine    #DVT ppx - Lovenox  # Diet - regular  # GI ppx - not indicated  Full code

## 2020-01-03 NOTE — PROGRESS NOTE ADULT - ASSESSMENT
Patient is a 39y old  Female who presents with a chief complaint of placement (31 Dec 2019 14:16)    #Intellectual disability with bipolar disorder complicated by frequent emotional outburst and refusal for dc  psychiatry follow up especially in light of threatening to hurt herself , continue with 1:1 for safety  appreciate psych chart note     #Obesity Morbid BMI (kg/m2): 52.4 (26 Dec 2019 22:15) patient needs to see dieitian outpatient for further evaluation     #Suspect osteoarthritis of left knee due to body habitus - pt and weight loss,     #Hypothyroidism on synthroid    Progress Note Handoff    Pending:  Psychiatry for evaluation  Disposition: Group home

## 2020-01-04 PROCEDURE — 99232 SBSQ HOSP IP/OBS MODERATE 35: CPT

## 2020-01-04 RX ADMIN — LITHIUM CARBONATE 450 MILLIGRAM(S): 300 TABLET, EXTENDED RELEASE ORAL at 17:06

## 2020-01-04 RX ADMIN — LATANOPROST 1 DROP(S): 0.05 SOLUTION/ DROPS OPHTHALMIC; TOPICAL at 21:18

## 2020-01-04 RX ADMIN — Medication 200 MILLIGRAM(S): at 05:59

## 2020-01-04 RX ADMIN — LACTULOSE 20 GRAM(S): 10 SOLUTION ORAL at 21:16

## 2020-01-04 RX ADMIN — Medication 137 MICROGRAM(S): at 05:58

## 2020-01-04 RX ADMIN — QUETIAPINE FUMARATE 200 MILLIGRAM(S): 200 TABLET, FILM COATED ORAL at 21:17

## 2020-01-04 RX ADMIN — Medication 1 SUPPOSITORY(S): at 12:20

## 2020-01-04 RX ADMIN — Medication 200 MILLIGRAM(S): at 17:06

## 2020-01-04 RX ADMIN — ENOXAPARIN SODIUM 40 MILLIGRAM(S): 100 INJECTION SUBCUTANEOUS at 21:17

## 2020-01-04 RX ADMIN — LITHIUM CARBONATE 450 MILLIGRAM(S): 300 TABLET, EXTENDED RELEASE ORAL at 05:58

## 2020-01-04 NOTE — PROGRESS NOTE ADULT - SUBJECTIVE AND OBJECTIVE BOX
LEO LADD  39y  FemaleSUH-N M3-4C [place] [address] A      Patient is a 39y old  Female who presents with a chief complaint of placement (03 Jan 2020 12:43)      INTERVAL HPI/OVERNIGHT EVENTS:  sp psych eval      REVIEW OF SYSTEMS:ROS neg    No pertinent family history in first degree relatives    T(C): --  HR: --  BP: --  RR: --  SpO2: --  Wt(kg): --Vital Signs Last 24 Hrs  T(C): --  T(F): --  HR: --  BP: --  BP(mean): --  RR: --  SpO2: --    PHYSICAL EXAM:    GEN Lying in no acute distress  HEENT Pupils equal and reactive to light and accommodationSupple Neck  PULM Clear to auscultation bilaterally  CV s1s2 regular rate and rhythm  GI + bowel sounds nontnender  EXT no cyanosis or edema  INTEG No Lesions  NEURO SCHAFFER        acetaminophen   Tablet .. 650 milliGRAM(s) Oral every 6 hours PRN  chlorhexidine 4% Liquid 1 Application(s) Topical <User Schedule>  enoxaparin Injectable 40 milliGRAM(s) SubCutaneous at bedtime  hydrocortisone hemorrhoidal Suppository 1 Suppository(s) Rectal daily  lactulose Syrup 20 Gram(s) Oral at bedtime  latanoprost 0.005% Ophthalmic Solution 1 Drop(s) Both EYES at bedtime  levothyroxine 137 MICROGram(s) Oral daily  lithium CR (ESKALITH-CR) 450 milliGRAM(s) Oral two times a day  LORazepam   Injectable 2 milliGRAM(s) IntraMuscular daily PRN  QUEtiapine 50 milliGRAM(s) Oral every 6 hours PRN  QUEtiapine 200 milliGRAM(s) Oral at bedtime  topiramate 200 milliGRAM(s) Oral two times a day      HEALTH ISSUES - PROBLEM Dx:  Adjustment disorder, unspecified type          Case Discussed with House Staff     Spectra [phone]

## 2020-01-04 NOTE — PROGRESS NOTE ADULT - ASSESSMENT
Patient is a 39y old  Female who presents with a chief complaint of placement (31 Dec 2019 14:16)    #Intellectual disability with bipolar disorder complicated by frequent emotional outburst and refusal for dc  appreciate psych follow up , will need to establish safe dc plan to group home     #Obesity Morbid BMI (kg/m2): 52.4 (26 Dec 2019 22:15) patient needs to see dieitian outpatient for further evaluation     #Suspect osteoarthritis of left knee due to body habitus - pt and weight loss,     #Hypothyroidism on synthroid    Progress Note Handoff    Pending:  sw for dispo planning   Disposition: Group home

## 2020-01-05 PROCEDURE — 99232 SBSQ HOSP IP/OBS MODERATE 35: CPT

## 2020-01-05 RX ADMIN — LITHIUM CARBONATE 450 MILLIGRAM(S): 300 TABLET, EXTENDED RELEASE ORAL at 05:27

## 2020-01-05 RX ADMIN — Medication 200 MILLIGRAM(S): at 17:17

## 2020-01-05 RX ADMIN — QUETIAPINE FUMARATE 200 MILLIGRAM(S): 200 TABLET, FILM COATED ORAL at 21:10

## 2020-01-05 RX ADMIN — Medication 650 MILLIGRAM(S): at 01:26

## 2020-01-05 RX ADMIN — Medication 1 SUPPOSITORY(S): at 12:02

## 2020-01-05 RX ADMIN — Medication 650 MILLIGRAM(S): at 22:11

## 2020-01-05 RX ADMIN — LATANOPROST 1 DROP(S): 0.05 SOLUTION/ DROPS OPHTHALMIC; TOPICAL at 21:10

## 2020-01-05 RX ADMIN — Medication 650 MILLIGRAM(S): at 15:36

## 2020-01-05 RX ADMIN — Medication 650 MILLIGRAM(S): at 16:00

## 2020-01-05 RX ADMIN — Medication 137 MICROGRAM(S): at 05:27

## 2020-01-05 RX ADMIN — Medication 200 MILLIGRAM(S): at 05:27

## 2020-01-05 RX ADMIN — Medication 2 MILLIGRAM(S): at 22:11

## 2020-01-05 RX ADMIN — LACTULOSE 20 GRAM(S): 10 SOLUTION ORAL at 21:10

## 2020-01-05 RX ADMIN — LITHIUM CARBONATE 450 MILLIGRAM(S): 300 TABLET, EXTENDED RELEASE ORAL at 17:17
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
## 2020-01-05 NOTE — PROGRESS NOTE ADULT - SUBJECTIVE AND OBJECTIVE BOX
LEO LADD  39[facility]  [facility] [place] [address] [place]      Patient is a 39y old  Female who presents with a chief complaint of placement (04 Jan 2020 14:44)      INTERVAL HPI/OVERNIGHT EVENTS:    no acute events overnight     REVIEW OF SYSTEMS:  ROS neg   FAMILY HISTORY:  No pertinent family history in first degree relatives    T(C): 35.8 (01-05-20 @ 05:30), Max: 36.4 (01-04-20 @ 21:13)  HR: 75 (01-05-20 @ 05:30) (75 - 84)  BP: 107/54 (01-05-20 @ 05:30) (107/54 - 119/76)  RR: 18 (01-05-20 @ 05:30) (18 - 18)  SpO2: 97% (01-05-20 @ 05:30) (97% - 97%)  Wt(kg): --Vital Signs Last 24 Hrs  T(C): 35.8 (05 Jan 2020 05:30), Max: 36.4 (04 Jan 2020 21:13)  T(F): 96.5 (05 Jan 2020 05:30), Max: 97.6 (04 Jan 2020 21:13)  HR: 75 (05 Jan 2020 05:30) (75 - 84)  BP: 107/54 (05 Jan 2020 05:30) (107/54 - 119/76)  BP(mean): --  RR: 18 (05 Jan 2020 05:30) (18 - 18)  SpO2: 97% (05 Jan 2020 05:30) (97% - 97%)    PHYSICAL EXAM:  GENERAL: NAD, well-groomed, well-developed  HEAD:  Atraumatic, Normocephalic  EYES: EOMI, PERRLA, conjunctiva and sclera clear  ENMT: No tonsillar erythema, exudates, or enlargement; Moist mucous membranes, Good dentition, No lesions  NECK: Supple, No JVD, Normal thyroid  PULM: Clear to auscultation bilaterally  CARDIAC: Regular rate and rhythm; No murmurs, rubs, or gallops  GI: Soft, Nontender, Nondistended; Bowel sounds present  EXTREMITIES:  2+ Peripheral Pulses, No clubbing, cyanosis, or edema  LYMPH: No lymphadenopathy noted  SKIN: No rashes or lesions        acetaminophen   Tablet .. 650 milliGRAM(s) Oral every 6 hours PRN  chlorhexidine 4% Liquid 1 Application(s) Topical <User Schedule>  enoxaparin Injectable 40 milliGRAM(s) SubCutaneous at bedtime  hydrocortisone hemorrhoidal Suppository 1 Suppository(s) Rectal daily  lactulose Syrup 20 Gram(s) Oral at bedtime  latanoprost 0.005% Ophthalmic Solution 1 Drop(s) Both EYES at bedtime  levothyroxine 137 MICROGram(s) Oral daily  lithium CR (ESKALITH-CR) 450 milliGRAM(s) Oral two times a day  LORazepam   Injectable 2 milliGRAM(s) IntraMuscular daily PRN  QUEtiapine 50 milliGRAM(s) Oral every 6 hours PRN  QUEtiapine 200 milliGRAM(s) Oral at bedtime  topiramate 200 milliGRAM(s) Oral two times a day      HEALTH ISSUES - PROBLEM Dx:  Adjustment disorder, unspecified type          Case Discussed with House Staff   Spectra [phone]
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
## 2020-01-06 VITALS
SYSTOLIC BLOOD PRESSURE: 106 MMHG | DIASTOLIC BLOOD PRESSURE: 59 MMHG | TEMPERATURE: 99 F | HEART RATE: 77 BPM | RESPIRATION RATE: 116 BRPM

## 2020-01-06 PROCEDURE — 99232 SBSQ HOSP IP/OBS MODERATE 35: CPT | Mod: GC

## 2020-01-06 PROCEDURE — 99232 SBSQ HOSP IP/OBS MODERATE 35: CPT

## 2020-01-06 RX ADMIN — Medication 200 MILLIGRAM(S): at 17:18

## 2020-01-06 RX ADMIN — QUETIAPINE FUMARATE 50 MILLIGRAM(S): 200 TABLET, FILM COATED ORAL at 17:19

## 2020-01-06 RX ADMIN — LATANOPROST 1 DROP(S): 0.05 SOLUTION/ DROPS OPHTHALMIC; TOPICAL at 21:08

## 2020-01-06 RX ADMIN — LITHIUM CARBONATE 450 MILLIGRAM(S): 300 TABLET, EXTENDED RELEASE ORAL at 17:18

## 2020-01-06 RX ADMIN — LITHIUM CARBONATE 450 MILLIGRAM(S): 300 TABLET, EXTENDED RELEASE ORAL at 05:18

## 2020-01-06 RX ADMIN — Medication 137 MICROGRAM(S): at 05:18

## 2020-01-06 RX ADMIN — QUETIAPINE FUMARATE 200 MILLIGRAM(S): 200 TABLET, FILM COATED ORAL at 21:07

## 2020-01-06 RX ADMIN — LACTULOSE 20 GRAM(S): 10 SOLUTION ORAL at 21:07

## 2020-01-06 RX ADMIN — Medication 200 MILLIGRAM(S): at 05:18

## 2020-01-06 RX ADMIN — ENOXAPARIN SODIUM 40 MILLIGRAM(S): 100 INJECTION SUBCUTANEOUS at 21:07
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
## 2020-01-06 NOTE — PROGRESS NOTE BEHAVIORAL HEALTH - NSBHCHARTREVIEWVS_PSY_A_CORE FT
Vital Signs Last 24 Hrs  T(C): 36.6 (06 Jan 2020 14:22), Max: 36.6 (06 Jan 2020 14:22)  T(F): 97.9 (06 Jan 2020 14:22), Max: 97.9 (06 Jan 2020 14:22)  HR: 72 (06 Jan 2020 14:22) (68 - 94)  BP: 136/72 (06 Jan 2020 14:22) (130/82 - 157/70)  BP(mean): --  RR: 20 (06 Jan 2020 14:22) (18 - 20)  SpO2: --
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
## 2020-01-06 NOTE — PROGRESS NOTE ADULT - ASSESSMENT
39 years old female known to have of bipolar disorder, intellectual disability, hypothyroidism, hemorrhoids, presented to ED for placement    #Placement  - as per - Initially we were considering that pt may need to be transferred to [place] [facility] facility, however psych has seen and recommended that Pt to return to group home and eventually would get moved to new group home from there.  But pt currently refusing to return to her group home.  will need chk w Soc work/CSM on Monday as pt continues to refuse to go back to grp home  - pt was prepared to be discharged on 1/3/20, at time of discharge got aggravated and threatened to commit suicide. Discharge delayed for now.  - c/s psych for in patient psych, will follow up    #Bipolar disorder  -continue with Topamax, lithium, Seroquel, Klonopin  - as per psych - not a candidate for IPP  - lithium level subtherapeutic, psych contacted - no lithium dose adjustment  - as per psych:  STOP PRN Ativan,  Add Seroquel 50mg q6 PRN for agitation that is not redirectable with behavioral modifications.   - currently on 1:1 sits    #Hypothyroidism  -continue with levothyroxine    #DVT ppx - Lovenox  # Diet - regular  # GI ppx - not indicated  # Full code

## 2020-01-06 NOTE — PROGRESS NOTE ADULT - ASSESSMENT
Patient is a 39y old  Female who presents with a chief complaint of placement (31 Dec 2019 14:16)    #Intellectual disability with bipolar disorder complicated by frequent emotional outburst and refusal for dc  given recent event will need psych follow up and ashley garcia regarding appropriate disposition    #Obesity Morbid BMI (kg/m2): 52.4 (26 Dec 2019 22:15) patient needs to see dieitian outpatient for further evaluation     #Suspect osteoarthritis of left knee due to body habitus - pt and weight loss,     #Hypothyroidism on synthroid    Progress Note Handoff    Pending:  social issue for disposition planning  Disposition: Group home
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
## 2020-01-06 NOTE — PROGRESS NOTE BEHAVIORAL HEALTH - NSBHATTESTSEENBY_PSY_A_CORE
attending Psychiatrist without NP/Trainee
attending Psychiatrist without NP/Trainee
Attending Psychiatrist supervising NP/Trainee, meeting pt...
Attending Psychiatrist supervising NP/Trainee, meeting pt...
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
## 2020-01-06 NOTE — PROGRESS NOTE BEHAVIORAL HEALTH - NSBHCONSULTFOLLOWAFTERCARE_PSY_A_CORE FT
pt to return to group home There is no acute psychiatric reason why patient can not be discharged back to her residence. She can follow up with the psychotherapist and psychiatrist affiliated with her group home upon discharge.

## 2020-01-07 PROCEDURE — 99239 HOSP IP/OBS DSCHRG MGMT >30: CPT

## 2020-01-07 RX ORDER — CHLORPROMAZINE HCL 10 MG
1 TABLET ORAL
Qty: 0 | Refills: 0 | DISCHARGE
Start: 2020-01-07

## 2020-01-07 RX ORDER — QUETIAPINE FUMARATE 200 MG/1
1 TABLET, FILM COATED ORAL
Qty: 0 | Refills: 0 | DISCHARGE

## 2020-01-07 RX ORDER — CHLORPROMAZINE HCL 10 MG
50 TABLET ORAL EVERY 6 HOURS
Refills: 0 | Status: DISCONTINUED | OUTPATIENT
Start: 2020-01-07 | End: 2020-01-07

## 2020-01-07 RX ADMIN — Medication 137 MICROGRAM(S): at 06:21

## 2020-01-07 RX ADMIN — Medication 200 MILLIGRAM(S): at 06:21

## 2020-01-07 RX ADMIN — LITHIUM CARBONATE 450 MILLIGRAM(S): 300 TABLET, EXTENDED RELEASE ORAL at 06:22

## 2020-01-07 NOTE — PROGRESS NOTE ADULT - REASON FOR ADMISSION
placement
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
## 2020-01-07 NOTE — PROGRESS NOTE ADULT - ASSESSMENT
Patient is a 39y old  Female who presents with a chief complaint of placement (31 Dec 2019 14:16)    #Intellectual disability with bipolar disorder complicated by frequent emotional outburst and refusal for dc  agreeable to dc , group home staff at bedside    #Obesity Morbid BMI (kg/m2): 52.4 (26 Dec 2019 22:15) patient needs to see dieitian outpatient for further evaluation     #Suspect osteoarthritis of left knee due to body habitus - pt and weight loss,     #Hypothyroidism on synthroid    Progress Note Handoff    Pending:  dc today , time spent coordinating care 44 min  Disposition: Group home

## 2020-01-07 NOTE — PROGRESS NOTE ADULT - PROVIDER SPECIALTY LIST ADULT
Hospitalist
Internal Medicine
Hospitalist
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
## 2020-01-07 NOTE — PROGRESS NOTE ADULT - SUBJECTIVE AND OBJECTIVE BOX
LEO LADD  39[facility] [address]      Patient is a 39y old  Female who presents with a chief complaint of placement (07 Jan 2020 08:17)      INTERVAL HPI/OVERNIGHT EVENTS:      no acute events overnight   REVIEW OF SYSTEMS:  ROS neg  FAMILY HISTORY:  No pertinent family history in first degree relatives    T(C): 37.2 (01-06-20 @ 21:04), Max: 37.2 (01-06-20 @ 21:04)  HR: 77 (01-06-20 @ 21:04) (72 - 77)  BP: 106/59 (01-06-20 @ 21:04) (106/59 - 136/72)  RR: 116 (01-06-20 @ 21:04) (20 - 116)  SpO2: --  Wt(kg): --Vital Signs Last 24 Hrs  T(C): 37.2 (06 Jan 2020 21:04), Max: 37.2 (06 Jan 2020 21:04)  T(F): 98.9 (06 Jan 2020 21:04), Max: 98.9 (06 Jan 2020 21:04)  HR: 77 (06 Jan 2020 21:04) (72 - 77)  BP: 106/59 (06 Jan 2020 21:04) (106/59 - 136/72)  BP(mean): --  RR: 116 (06 Jan 2020 21:04) (20 - 116)  SpO2: --    PHYSICAL EXAM:    GEN Lying in no acute distress  HEENT Pupils equal and reactive to light and accommodationSupple Neck  PULM Clear to auscultation bilaterally  CV s1s2 regular rate and rhythm  GI + bowel sounds nontnender  EXT no cyanosis or edema  INTEG No Lesions  NEURO SCHAFFER              acetaminophen   Tablet .. 650 milliGRAM(s) Oral every 6 hours PRN  chlorhexidine 4% Liquid 1 Application(s) Topical <User Schedule>  chlorproMAZINE    Tablet 50 milliGRAM(s) Oral every 6 hours  chlorproMAZINE    Tablet 50 milliGRAM(s) Oral every 6 hours  enoxaparin Injectable 40 milliGRAM(s) SubCutaneous at bedtime  hydrocortisone hemorrhoidal Suppository 1 Suppository(s) Rectal daily  lactulose Syrup 20 Gram(s) Oral at bedtime  latanoprost 0.005% Ophthalmic Solution 1 Drop(s) Both EYES at bedtime  levothyroxine 137 MICROGram(s) Oral daily  lithium CR (ESKALITH-CR) 450 milliGRAM(s) Oral two times a day  QUEtiapine 200 milliGRAM(s) Oral at bedtime  topiramate 200 milliGRAM(s) Oral two times a day      HEALTH ISSUES - PROBLEM Dx:  Adjustment disorder, unspecified type          Case Discussed with House Staff     Spectra [phone]

## 2020-01-13 NOTE — ED PROVIDER NOTE - CARE PLAN
Addended by: Judson Favorite on: 1/13/2020 02:58 PM     Modules accepted: Orders
Principal Discharge DX:	Impulse control disorder  Secondary Diagnosis:	Bipolar disorder

## 2020-01-14 DIAGNOSIS — R45.851 SUICIDAL IDEATIONS: ICD-10-CM

## 2020-01-14 DIAGNOSIS — E03.9 HYPOTHYROIDISM, UNSPECIFIED: ICD-10-CM

## 2020-01-14 DIAGNOSIS — F31.9 BIPOLAR DISORDER, UNSPECIFIED: ICD-10-CM

## 2020-01-14 DIAGNOSIS — R45.1 RESTLESSNESS AND AGITATION: ICD-10-CM

## 2020-01-14 DIAGNOSIS — F79 UNSPECIFIED INTELLECTUAL DISABILITIES: ICD-10-CM

## 2020-01-14 DIAGNOSIS — Z75.1 PERSON AWAITING ADMISSION TO ADEQUATE FACILITY ELSEWHERE: ICD-10-CM

## 2020-01-14 DIAGNOSIS — K64.9 UNSPECIFIED HEMORRHOIDS: ICD-10-CM

## 2020-01-14 DIAGNOSIS — Z88.8 ALLERGY STATUS TO OTHER DRUGS, MEDICAMENTS AND BIOLOGICAL SUBSTANCES STATUS: ICD-10-CM

## 2020-01-14 DIAGNOSIS — E66.01 MORBID (SEVERE) OBESITY DUE TO EXCESS CALORIES: ICD-10-CM

## 2020-01-14 DIAGNOSIS — M17.12 UNILATERAL PRIMARY OSTEOARTHRITIS, LEFT KNEE: ICD-10-CM

## 2020-01-14 DIAGNOSIS — F43.20 ADJUSTMENT DISORDER, UNSPECIFIED: ICD-10-CM

## 2020-02-03 ENCOUNTER — EMERGENCY (EMERGENCY)
Facility: HOSPITAL | Age: 40
LOS: 1 days | Discharge: HOME | End: 2020-02-03
Attending: STUDENT IN AN ORGANIZED HEALTH CARE EDUCATION/TRAINING PROGRAM | Admitting: EMERGENCY MEDICINE
Payer: MEDICARE

## 2020-02-03 VITALS
SYSTOLIC BLOOD PRESSURE: 112 MMHG | DIASTOLIC BLOOD PRESSURE: 77 MMHG | HEART RATE: 115 BPM | TEMPERATURE: 97 F | OXYGEN SATURATION: 96 % | RESPIRATION RATE: 18 BRPM

## 2020-02-03 PROCEDURE — 99285 EMERGENCY DEPT VISIT HI MDM: CPT

## 2020-02-03 RX ORDER — ACETAMINOPHEN 500 MG
650 TABLET ORAL ONCE
Refills: 0 | Status: COMPLETED | OUTPATIENT
Start: 2020-02-03 | End: 2020-02-03

## 2020-02-03 RX ORDER — OLANZAPINE 15 MG/1
10 TABLET, FILM COATED ORAL ONCE
Refills: 0 | Status: COMPLETED | OUTPATIENT
Start: 2020-02-03 | End: 2020-02-03

## 2020-02-03 RX ORDER — CHLORPROMAZINE HCL 10 MG
50 TABLET ORAL ONCE
Refills: 0 | Status: COMPLETED | OUTPATIENT
Start: 2020-02-03 | End: 2020-02-03

## 2020-02-03 RX ADMIN — Medication 2 MILLIGRAM(S): at 21:32

## 2020-02-03 RX ADMIN — Medication 650 MILLIGRAM(S): at 23:06

## 2020-02-03 RX ADMIN — Medication 2 MILLIGRAM(S): at 19:23

## 2020-02-03 RX ADMIN — Medication 50 MILLIGRAM(S): at 19:22

## 2020-02-03 RX ADMIN — Medication 650 MILLIGRAM(S): at 22:06

## 2020-02-03 RX ADMIN — Medication 50 MILLIGRAM(S): at 21:32

## 2020-02-03 RX ADMIN — OLANZAPINE 10 MILLIGRAM(S): 15 TABLET, FILM COATED ORAL at 22:43
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
## 2020-02-03 NOTE — ED ADULT NURSE NOTE - OBJECTIVE STATEMENT
Received pt to  A&Ox4 agitated, pt arrives from group home r/t wanting to leave group home. Pt known to department. Meds administered as ordered, will reassess.

## 2020-02-03 NOTE — ED BEHAVIORAL HEALTH NOTE - BEHAVIORAL HEALTH NOTE
Writer attempted to reach out to Lars, Senior Coordinator of Operations, at [phone] to inform of discharge. Writer received VM and message was left. Writer contacted main number listed and was unable to get through. Information signed out to overnight  staff.

## 2020-02-03 NOTE — ED PROVIDER NOTE - NSFOLLOWUPCLINICS_GEN_ALL_ED_FT
[facility]  Behavioral Health  [address] [place] [address]  Phone: [phone]  Fax:   Follow Up Time:

## 2020-02-03 NOTE — ED PROVIDER NOTE - PATIENT PORTAL LINK FT
You can access the FollowMyHealth Patient Portal offered by [facility] by registering at the following website: [email or web address]. By joining [email or web address]’s FollowMyHealth portal, you will also be able to view your health information using other applications (apps) compatible with our system. You can access the FollowMyHealth Patient Portal offered by [facility] by registering at the following website: [email or web address]. By joining [employer]’s FollowMyHealth portal, you will also be able to view your health information using other applications (apps) compatible with our system.

## 2020-02-03 NOTE — ED PROVIDER NOTE - CLINICAL SUMMARY MEDICAL DECISION MAKING FREE TEXT BOX
Pt referred from group home for evaluation, agitation, without suicidality, for medical/psychiatric evaluation/social work and reassess.

## 2020-02-03 NOTE — ED ADULT TRIAGE NOTE - CHIEF COMPLAINT QUOTE
Pt from group home, [facility] sent for being combative after trying to elope. Pt is tearful and agitated in triage, not answering questions. History Bipolar, MR, hypothyroid
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
## 2020-02-03 NOTE — ED ADULT NURSE REASSESSMENT NOTE - NS ED NURSE REASSESS COMMENT FT1
Patient became agitated, combative, and started yelling, began to bang on nurses station glass yelling "I don't want to go back to my group home". Security called to bedside for safety, SONG Martinez at bedside. Patient placed in 4 point restraints and placed in front of nurses station. Medicated as per MD orders. Patient appears in no distress at this time. 1:1 observation maintained.
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
## 2020-02-03 NOTE — ED PROVIDER NOTE - OBJECTIVE STATEMENT
Pt sent from group home for psychiatric evaluation- seen initially by ANN Tilley.  Pt is at a group home [place] house and endorses that she does not want to go back. has not acute medical complaints.  Referred for a psychiatric/behavioral evaluation.  No suicidality, hallucinations.

## 2020-02-04 VITALS
DIASTOLIC BLOOD PRESSURE: 71 MMHG | OXYGEN SATURATION: 100 % | SYSTOLIC BLOOD PRESSURE: 131 MMHG | TEMPERATURE: 98 F | HEART RATE: 78 BPM | RESPIRATION RATE: 16 BRPM

## 2020-02-04 DIAGNOSIS — F43.24 ADJUSTMENT DISORDER WITH DISTURBANCE OF CONDUCT: ICD-10-CM

## 2020-02-04 PROCEDURE — 90792 PSYCH DIAG EVAL W/MED SRVCS: CPT

## 2020-02-04 RX ORDER — OLANZAPINE 15 MG/1
10 TABLET, FILM COATED ORAL ONCE
Refills: 0 | Status: COMPLETED | OUTPATIENT
Start: 2020-02-04 | End: 2020-02-04

## 2020-02-04 RX ORDER — CHLORPROMAZINE HCL 10 MG
50 TABLET ORAL ONCE
Refills: 0 | Status: COMPLETED | OUTPATIENT
Start: 2020-02-04 | End: 2020-02-04

## 2020-02-04 RX ORDER — DIPHENHYDRAMINE HCL 50 MG
50 CAPSULE ORAL EVERY 4 HOURS
Refills: 0 | Status: DISCONTINUED | OUTPATIENT
Start: 2020-02-04 | End: 2020-02-07

## 2020-02-04 RX ORDER — CHLORPROMAZINE HCL 10 MG
100 TABLET ORAL ONCE
Refills: 0 | Status: COMPLETED | OUTPATIENT
Start: 2020-02-04 | End: 2020-02-04

## 2020-02-04 RX ADMIN — Medication 50 MILLIGRAM(S): at 09:54

## 2020-02-04 RX ADMIN — Medication 2 MILLIGRAM(S): at 18:55

## 2020-02-04 RX ADMIN — Medication 100 MILLIGRAM(S): at 18:55

## 2020-02-04 RX ADMIN — Medication 50 MILLIGRAM(S): at 20:59

## 2020-02-04 RX ADMIN — Medication 2 MILLIGRAM(S): at 01:55

## 2020-02-04 RX ADMIN — OLANZAPINE 10 MILLIGRAM(S): 15 TABLET, FILM COATED ORAL at 03:21

## 2020-02-04 RX ADMIN — Medication 50 MILLIGRAM(S): at 04:27

## 2020-02-04 RX ADMIN — Medication 50 MILLIGRAM(S): at 02:23

## 2020-02-04 RX ADMIN — OLANZAPINE 10 MILLIGRAM(S): 15 TABLET, FILM COATED ORAL at 21:13

## 2020-02-04 RX ADMIN — Medication 2 MILLIGRAM(S): at 04:26

## 2020-02-04 NOTE — ED ADULT NURSE REASSESSMENT NOTE - NS ED NURSE REASSESS COMMENT FT1
pt screaming and trying to get out of restraints on stretcher. waiting MD order will continue to monitor.
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
## 2020-02-04 NOTE — ED BEHAVIORAL HEALTH ASSESSMENT NOTE - VIOLENCE RISK FACTORS:
Impulsivity/Lack of insight into violence risk/need for treatment/Affective dysregulation/Community stressors that increase the risk of destabilization/Irritability
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
## 2020-02-04 NOTE — ED ADULT NURSE REASSESSMENT NOTE - NS ED NURSE REASSESS COMMENT FT1
EMS transported patient back to group home.  Patient calm and cooperative.  EMS given transport documents.
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
## 2020-02-04 NOTE — ED ADULT NURSE REASSESSMENT NOTE - NS ED NURSE REASSESS COMMENT FT1
Spoke with , Lars Mckinley @ [phone], pt is ok to return as per Mr. Sousa via ems to [facility] home. Manager is aware group [place] [facility] staff is not escorting pt home. EMS transport arranged. Will continue to monitor pt for safety.
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
## 2020-02-04 NOTE — ED ADULT NURSE REASSESSMENT NOTE - NS ED NURSE REASSESS COMMENT FT1
Pt in NAD, social work still attempting to work on pts transportation back to facility, pt is banging on equipment at times but able to re-direct, pt was medicated as ordered, will continue to monitor.

## 2020-02-04 NOTE — ED BEHAVIORAL HEALTH ASSESSMENT NOTE - SAFETY PLAN ADDT'L DETAILS
Education provided regarding environmental safety / lethal means restriction/Provision of National Suicide Prevention Lifeline 7-040-603-BKZW (3232)/Safety plan discussed with...

## 2020-02-04 NOTE — ED ADULT NURSE REASSESSMENT NOTE - NS ED NURSE REASSESS COMMENT FT1
pt yelling and screaming, punching walls and trying to hit head against wall. multiple attempts at verbal deescalation were unsuccessful, medication given as per NPs orders. pt pending discharge and transportation. will cont to monitor.

## 2020-02-04 NOTE — ED BEHAVIORAL HEALTH ASSESSMENT NOTE - DESCRIPTION
see HPI    Vital Signs Last 24 Hrs  T(C): 36.6 (04 Feb 2020 05:00), Max: 36.7 (04 Feb 2020 00:15)  T(F): 97.9 (04 Feb 2020 05:00), Max: 98 (04 Feb 2020 00:15)  HR: 69 (04 Feb 2020 10:23) (69 - 117)  BP: 115/60 (04 Feb 2020 10:23) (112/77 - 134/58)  BP(mean): --  RR: 18 (04 Feb 2020 10:23) (17 - 18)  SpO2: 98% (04 Feb 2020 10:23) (96% - 98%) obesity, hypothyroidism, glaucoma Lives in [facility] group home since Summer 2015

## 2020-02-04 NOTE — ED ADULT NURSE REASSESSMENT NOTE - NS ED NURSE REASSESS COMMENT FT1
+ effect from IM meds received, 4pt restraint and 1:1 obs dc'd at 2325. No injuries observed or reported. Pt remains awake, VSS, NAD, able to follow verbal command. Ambulated to bathroom with steady gait. PO fluids provided. MC for DC by Enrrique ZULETA. Awaiting contact with group home to accept pt back via ems. Will hold in ED until safe for DC.

## 2020-02-04 NOTE — ED ADULT NURSE REASSESSMENT NOTE - NS ED NURSE REASSESS COMMENT FT1
Patient calm, cooperative and sleeping.  Released from 4 point restraints.  Constant Observation and level II restraints discontinue.  Skin assessed, intact, no swelling or redness noted.  ROM performed and patient given a glass of water prior to her falling asleep.  Vitals taken and will continue to monitor patient.
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
## 2020-02-04 NOTE — ED ADULT NURSE REASSESSMENT NOTE - NS ED NURSE REASSESS COMMENT FT1
Patient agitated, aggressive, banging on the glass window at the nurses station, yelling "I don't want to go home" and unable to redirect patient.  Medicated per MD orders and will continue to monitor patient.
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
## 2020-02-04 NOTE — ED BEHAVIORAL HEALTH NOTE - BEHAVIORAL HEALTH NOTE
Per provider, SONG Sheppard, patient is cleared and is able to return to their previous residence, [facility].  has spoken to Lars Mckinley, Senior Coordinator of Operations at [facility], [phone] who reported staff are not available to come pick patient up at this time.  confirmed that patients mode of transportation is AMBULANCE and that patient travels INDEPENDENTLY. Clinical provider is in agreement with AMBULANCE back to group home.  Nursing staff to arrange transport.

## 2020-02-04 NOTE — ED ADULT NURSE REASSESSMENT NOTE - NS ED NURSE REASSESS COMMENT FT1
Patient is asleep, appears comfortable and in no apparent distress.  Will continue to monitor patient.

## 2020-02-06 NOTE — ED PROVIDER NOTE - PROGRESS NOTE DETAILS
Subjective   54-year-old female presents for reevaluation of wounds to lower leg.  Patient states she had one lesion starting 3 days ago and then noticed 2 more lesions yesterday.  All 3 lesions are located at posterior lower left leg.  Patient states they are itchy but not painful.  Patient was evaluated by primary care yesterday, was told to use Benadryl and anti-itch cream.  Patient denies fevers or chills.  No nausea or vomiting.  No muscle aches numbness, tingling, weakness.  Patient does not recall any specific bite.  Patient has primary care follow-up scheduled for tomorrow for reevaluation.          Review of Systems   All other systems reviewed and are negative.      Past Medical History:   Diagnosis Date   • Bipolar disorder (CMS/HCC)    • Depression    • Hyperlipidemia    • Hypothyroidism    • Kidney stone        Allergies   Allergen Reactions   • Sulfa Antibiotics Rash     fever       Past Surgical History:   Procedure Laterality Date   • BLADDER SUSPENSION      TVT   • KIDNEY STONE SURGERY     • KNEE MENISCAL REPAIR Bilateral    • KNEE SURGERY         Family History   Problem Relation Age of Onset   • Cancer Mother         lung   • Heart attack Father    • Colon cancer Maternal Grandmother    • Breast cancer Neg Hx    • Ovarian cancer Neg Hx    • Pulmonary embolism Neg Hx    • Deep vein thrombosis Neg Hx        Social History     Socioeconomic History   • Marital status:      Spouse name: Not on file   • Number of children: Not on file   • Years of education: Not on file   • Highest education level: Not on file   Tobacco Use   • Smoking status: Never Smoker   • Smokeless tobacco: Never Used   Substance and Sexual Activity   • Alcohol use: No   • Drug use: No   • Sexual activity: Yes     Partners: Male     Birth control/protection: Surgical     Comment: vas           Objective   Physical Exam   Constitutional: She is oriented to person, place, and time. She appears well-developed and  well-nourished. No distress.   HENT:   Head: Normocephalic and atraumatic.   Eyes: Pupils are equal, round, and reactive to light. Conjunctivae and EOM are normal.   Neck: Normal range of motion. Neck supple.   Cardiovascular: Normal rate and regular rhythm.   Pulmonary/Chest: Effort normal and breath sounds normal.   Abdominal: Soft. She exhibits no distension. There is no tenderness.   Musculoskeletal: Normal range of motion. She exhibits no tenderness or deformity.   Trace bilateral lower extremity edema   Neurological: She is alert and oriented to person, place, and time. No cranial nerve deficit or sensory deficit. She exhibits normal muscle tone.   Skin: She is not diaphoretic.   3 distinct lesions to the posterior lower left leg.  All 3 lesions have necrotic centers without purulent drainage or bleeding.  All 3 lesions have some degree of erythema migrans.  No underlying fluctuance.  No tenderness to palpation.   Psychiatric: She has a normal mood and affect. Her behavior is normal.       Procedures           ED Course  ED Course as of Feb 06 0957   Thu Feb 06, 2020   0955 PCP note and images from yesterday reviewed.  Lesions do not appear to be progressing in size but do possibly have more erythema, although this may simply be difference in lighting from pictures to today.  Patient denies that these lesions were ever particularly painful.  Patient has no systemic symptoms.  Given that there are 3 lesions, time of year, feel tick illness highly unlikely.  Patient does have some erythema and minimal warmth to lesions, so will cover for cellulitis.  Wounds are now several days old with minimal progression over the past 24 hours, so do not feel inpatient monitoring or further evaluation today is warranted.  Advised patient to keep primary care appointment tomorrow.  Return precautions discussed.    [TD]      ED Course User Index  [TD] Marino Sanchez MD                                                MDM    Final diagnoses:   Insect bite of left lower leg, initial encounter   Cellulitis, unspecified cellulitis site            Marino Sanchez MD  02/06/20 6879     Fredy THOMPSON: CT and lab work do not show any acute abnormality; patient tolerating PO intake w/o difficulty in the ER.  Patient stable for d/c.  Discussed with social work.

## 2020-04-23 NOTE — ED PROVIDER NOTE - SIGNIFICANT NEGATIVE FINDINGS
MANJIT STRANGE  [id number]  1947 DOA 2020 DR KYLER SOUSA    ALLERGY  shellfish                 Initial evaluation/Pulmonary Critical Care consultation requested on  2020  by Dr Dominguez  from Dr Scott   Patient examined chart reviewed    HOSPITAL ADMISSION   PATIENT CAME  FROM (if information available)      MANJIT STRANGE  58 [id number]  1947 DOA 2020 DR KYLER SOUSA       REVIEW OF SYMPTOMS      Able to give ROS  NO     PHYSICAL EXAM    HEENT Unremarkable PERRLA atraumatic   RESP Fair air entry EXP prolonged    Harsh breath sound Resp distres mild   CARDIAC S1 S2 No S3     NO JVD    ABDOMEN SOFT BS PRESENT NOT DISTENDED No hepatosplenomegaly PEDAL EDEMA present No calf tenderness  NO rash   GENERAL Not TOXIC looking    VITALS/LABS       2020 96 52 91/55   2020 w 8.7 Hb 12.5 Plt 152 Na 147 K 4.1 CO2 28 Cr 1.3       PT DATA/BEST PRACTICE  ALLERGY     NOTEWORTHY  POINTS/CHANGES ROS/PE                                  WT  95 (2020)                    BMI    30 (2020)   CrCl           ADVANCED DIRECTIVE       Goals of care discussion                                                                                      HEAD OF BED ELEVATION Yes  DYSPHAGIA EVAL                                  DIET    mech soift cons carb ()                                      IV F                                                       DVT PROPHYLAXIS        apixaban 10.2 () (cta  john pe subseg)                   PATIENT SUMMARY   Patient is a 72M with a PMH of CAD s/p MI w/stents , CHF with 20% LVEF in 2016,  HTN, HLD, DM, gout, CKD, COPD, PVD s/p aortobifemoral bypass who presentd to the ED with dyspnea.  Patient  AAOx1 and unable to provide history.  As per  family members co cough and dyspnea. Feeling ill since COVID pandemic started.  Vitals stable.  Labs showed hypercalcemia, mild tropinema and elevated bilirubin.  CT head performed and was negative.  CT chest shows  bilateral PNA and a JOHN acute PE.  Started on therapeutic lovenox, will admit to tele.              PATIENT DATA/ASSESSMENT/RECOMMENDATIONS       DYSPNEA POA 2020   OXYGENATION  2020 ra 97%   GAS EXCHANGE   2020 ra 744/39/62   PNEUMONIA POA 2020 w 8.7   2020 blod c n   cta ch  bl pneum sophia lll subsegmental john pulmonary embolism   zosyn () (Dr Agudelo)   RO COVID    covid pcr n     PULMONARY EMBOLISM  POA 2020 d-dimer 2264  cta ch  bl pneum sophia lll subsegmental john pulmonary embolism   apixaban 10.2 ()   A/R Cont rx change to apixaban 5.2 after 7d   PULMONARY HYPERTENSION    E 2020 echo ef 25% severely decr segmental lvsf dialted cmpthy dd2 pasp 53   HO COPD      prov ()   TROPONINEMIA POA 2020   HO CAD W STENTS 2007   coreg 25.2 ()   lasix 40 ()   HO CHF EF 20% IN 2016   E 2020 echo ef 25% severely decr segmental lvsf dialted cmpthy dd2 pasp 53   sacubitril 24 valsartan 26x2 ()   HO PVD SP AORTOBIFEMORAL BPG   HO DM   iss ()   ELEVATED BILIRIBUN POA 2020   DB 2020 DB 5.3  IB 2020 2.2   US abd  sl hepatomeg Hepatic veins patent   2020 Seen by Dr Patel Recommen  r/o congestive liver, meds, cirrhosis , Bud Chiari  1) abdominal sonogram with doppler 2) Treat CHF 3) Hold Lipitor 4) f/u labs 5) additional blood work    ACE   2020 Cr 1.3  HYPERCALCEMIA POA 2020 Ca i 1.41   ALTERED MENTAL STATE POA 2020 ct h n            ASSESSMENT PLAN  DYSPNEA POA 2020   Likely sec CHF PE possible pneumonia On rx  PNEUMONIA POA 2020   On zosyn Chck mrsa pc   PULMONARY EMBOLISM  POA    On apixaban Check v duplex   PULMONARY HYPERTENSION  Will need further workup as out pts Pl refer to my office   HO COPD      TROPONINEMIA POA 2020   HO CAD W STENTS 2007   Felt to be demand ischemia  Cardio on case   HO CHF EF 20% IN 2016   On sacubitril 24 valsartan 26x2 (  HO PVD SP AORTOBIFEMORAL BPG   HO DM   ACE   HYPERCALCEMIA POA 2020   ELEVATED BILIRIBUN POA 2020   Likely sec r hf   ALTERED MENTAL STATE POA 2020                               ct h was n                                           TIME SPENT Over 55 minutes aggregate care time spent on encounter; activities included   direct pt care, counseling and/or coordinating care reviewing notes, lab data/ imaging , discussion with multidisciplinary team/ pt /family. Risks, benefits, alternatives  discussed in detail.      MANJIT STRANGE  [id number]  1947 DOA 2020 DR KYLER SOUSA 71 yo male with persistent dyspnea and cough.  Significant h/o ischemic cardiomyopathy and h/o heart failure.  Respiratory distress may be multi factorial - evidence on CXR for pulm vascular congestion but CT chest shows bilateral pneumonia and possible JUAREZ acute PE.  Minimally elevated cardiac enzymes appear consistent with demand ischemia rather than primary cardiac event.    Suggest:  Continue tx for CAP as per medicine.   ON PE dosing of Lovenox, segue to oral anticoagulant in AM.  Suggest LE dopplers for source of embolism.  Treated empirically for acute on chronic systolic/diastolic heart failure but patient appears clinically euvolemic.  Continue low dose ASA, bbl and would change Lisinopril to Entresto.   TTE to re-evaluate LVEF dysfunction vs right heart failure.  No ischemia evaluation needed at present. HPI:  Patient is a 72M with a PMH of CAD s/p MI w/stents 2007, CHF with 20% LVEF in 2016,  HTN, HLD, DM, gout, CKD, COPD, PVD s/p aortobifemoral bypass who presents to the ED for dyspnea.  Patient currently AAOx1 and unable to provide history.  As per ED attending, patient was sent in by family members due to cough and dyspnea.  Reportedly has been feeling ill since COVID pandemic started.  Vitals stable.  Labs show hypercalcemia, mild tropinema and elevated bilirubin.  CT head performed and was negative.  CT chest shows  bilateral PNA and a JUAREZ acute PE.  Started on therapeutic lovenox, will admit to tele. (21 Apr 2020 18:15)  --------------- As Above ------------ Patient seen earlier today  Consult called for elevated bilirubin. Patient confused. The patient denies history of liver disease, melena, hematochezia, hematemesis, nausea, vomiting, abdominal pain, constipation, diarrhea, or change in bowel movements Denies ETOH abuse. History of EF ~ 20 %  On Zocor  See labs / CT scan    Elevated LFTs  Bili>>  10.4 / 83 / 64 / 111--> 9.3 / 81 / 62 / 101 ( 9.6 total, 6.37 direct ). r/o congestive liver, meds, cirrhosis , Bud Chiari  1) abdominal sonogram with doppler 2) Treat CHF 3) Hold Lipitor 4) f/u labs 5) additional blood work no fevers, dysuria

## 2020-05-04 ENCOUNTER — APPOINTMENT (OUTPATIENT)
Dept: OBGYN | Facility: CLINIC | Age: 40
End: 2020-05-04

## 2020-05-26 NOTE — ED BEHAVIORAL HEALTH ASSESSMENT NOTE - CURRENT MEDICATION
Please see the Refill Note   and consider in Roxanna Borja MD 's absence.      Thank you!  [facility]   Note composed:10:38 AM 05/26/2020        
Lithium CR 450mg BID, Seroquel 200mg HS,  Topamax 200mg BID,  Trazodone 50mg HS, Klonopin 1mg BID, Cogentin 1mg BID

## 2020-06-08 ENCOUNTER — APPOINTMENT (OUTPATIENT)
Dept: ENDOCRINOLOGY | Facility: CLINIC | Age: 40
End: 2020-06-08

## 2020-06-11 NOTE — ED PROVIDER NOTE - PRINCIPAL DIAGNOSIS
"Anesthesia Transfer of Care Note    Patient: Kristy Hernandez    Procedure(s) Performed: Procedure(s) (LRB):  INCISION AND DRAINAGE, ABSCESS (Left)    Patient location: PACU    Anesthesia Type: general    Transport from OR: Transported from OR on room air with adequate spontaneous ventilation    Post pain: adequate analgesia    Post assessment: no apparent anesthetic complications    Post vital signs: stable    Level of consciousness: sedated and responds to stimulation    Nausea/Vomiting: no nausea/vomiting    Complications: none    Transfer of care protocol was followed      Last vitals:   Visit Vitals  BP (!) 133/92 (BP Location: Right arm, Patient Position: Lying)   Pulse 87   Temp 37.1 °C (98.7 °F) (Oral)   Resp 18   Ht 5' 2" (1.575 m)   Wt 91.2 kg (201 lb)   SpO2 98%   Breastfeeding? No   BMI 36.76 kg/m²     " Costochondral chest pain

## 2020-07-21 ENCOUNTER — EMERGENCY (EMERGENCY)
Facility: HOSPITAL | Age: 40
LOS: 1 days | Discharge: HOME | End: 2020-07-21
Attending: EMERGENCY MEDICINE | Admitting: EMERGENCY MEDICINE
Payer: MEDICARE

## 2020-07-21 VITALS
HEART RATE: 78 BPM | OXYGEN SATURATION: 98 % | RESPIRATION RATE: 18 BRPM | DIASTOLIC BLOOD PRESSURE: 61 MMHG | SYSTOLIC BLOOD PRESSURE: 111 MMHG | TEMPERATURE: 98 F

## 2020-07-21 VITALS
RESPIRATION RATE: 17 BRPM | HEART RATE: 79 BPM | SYSTOLIC BLOOD PRESSURE: 104 MMHG | DIASTOLIC BLOOD PRESSURE: 82 MMHG | OXYGEN SATURATION: 97 % | TEMPERATURE: 98 F

## 2020-07-21 LAB
ALBUMIN SERPL ELPH-MCNC: 4 G/DL — SIGNIFICANT CHANGE UP (ref 3.3–5)
ALP SERPL-CCNC: 57 U/L — SIGNIFICANT CHANGE UP (ref 40–120)
ALT FLD-CCNC: 21 U/L — SIGNIFICANT CHANGE UP (ref 4–33)
ANION GAP SERPL CALC-SCNC: 11 MMO/L — SIGNIFICANT CHANGE UP (ref 7–14)
APAP SERPL-MCNC: < 15 UG/ML — LOW (ref 15–25)
AST SERPL-CCNC: 20 U/L — SIGNIFICANT CHANGE UP (ref 4–32)
BASOPHILS # BLD AUTO: 0.08 K/UL — SIGNIFICANT CHANGE UP (ref 0–0.2)
BASOPHILS NFR BLD AUTO: 0.7 % — SIGNIFICANT CHANGE UP (ref 0–2)
BILIRUB SERPL-MCNC: 0.2 MG/DL — SIGNIFICANT CHANGE UP (ref 0.2–1.2)
BUN SERPL-MCNC: 19 MG/DL — SIGNIFICANT CHANGE UP (ref 7–23)
CALCIUM SERPL-MCNC: 9.2 MG/DL — SIGNIFICANT CHANGE UP (ref 8.4–10.5)
CHLORIDE SERPL-SCNC: 109 MMOL/L — HIGH (ref 98–107)
CO2 SERPL-SCNC: 19 MMOL/L — LOW (ref 22–31)
CREAT SERPL-MCNC: 0.88 MG/DL — SIGNIFICANT CHANGE UP (ref 0.5–1.3)
EOSINOPHIL # BLD AUTO: 0.11 K/UL — SIGNIFICANT CHANGE UP (ref 0–0.5)
EOSINOPHIL NFR BLD AUTO: 1 % — SIGNIFICANT CHANGE UP (ref 0–6)
ETHANOL BLD-MCNC: < 10 MG/DL — SIGNIFICANT CHANGE UP
GLUCOSE SERPL-MCNC: 104 MG/DL — HIGH (ref 70–99)
HCG SERPL-ACNC: < 5 MIU/ML — SIGNIFICANT CHANGE UP
HCT VFR BLD CALC: 37 % — SIGNIFICANT CHANGE UP (ref 34.5–45)
HGB BLD-MCNC: 11.1 G/DL — LOW (ref 11.5–15.5)
IMM GRANULOCYTES NFR BLD AUTO: 0.4 % — SIGNIFICANT CHANGE UP (ref 0–1.5)
LITHIUM SERPL-MCNC: 0.44 MMOL/L — LOW (ref 0.6–1.2)
LYMPHOCYTES # BLD AUTO: 2.74 K/UL — SIGNIFICANT CHANGE UP (ref 1–3.3)
LYMPHOCYTES # BLD AUTO: 24.9 % — SIGNIFICANT CHANGE UP (ref 13–44)
MCHC RBC-ENTMCNC: 30 % — LOW (ref 32–36)
MCHC RBC-ENTMCNC: 30.7 PG — SIGNIFICANT CHANGE UP (ref 27–34)
MCV RBC AUTO: 102.5 FL — HIGH (ref 80–100)
MONOCYTES # BLD AUTO: 0.64 K/UL — SIGNIFICANT CHANGE UP (ref 0–0.9)
MONOCYTES NFR BLD AUTO: 5.8 % — SIGNIFICANT CHANGE UP (ref 2–14)
NEUTROPHILS # BLD AUTO: 7.41 K/UL — HIGH (ref 1.8–7.4)
NEUTROPHILS NFR BLD AUTO: 67.2 % — SIGNIFICANT CHANGE UP (ref 43–77)
NRBC # FLD: 0 K/UL — SIGNIFICANT CHANGE UP (ref 0–0)
PLATELET # BLD AUTO: 271 K/UL — SIGNIFICANT CHANGE UP (ref 150–400)
PMV BLD: 10.4 FL — SIGNIFICANT CHANGE UP (ref 7–13)
POTASSIUM SERPL-MCNC: 3.4 MMOL/L — LOW (ref 3.5–5.3)
POTASSIUM SERPL-SCNC: 3.4 MMOL/L — LOW (ref 3.5–5.3)
PROT SERPL-MCNC: 7.4 G/DL — SIGNIFICANT CHANGE UP (ref 6–8.3)
RBC # BLD: 3.61 M/UL — LOW (ref 3.8–5.2)
RBC # FLD: 12.9 % — SIGNIFICANT CHANGE UP (ref 10.3–14.5)
SALICYLATES SERPL-MCNC: < 5 MG/DL — LOW (ref 15–30)
SODIUM SERPL-SCNC: 139 MMOL/L — SIGNIFICANT CHANGE UP (ref 135–145)
TSH SERPL-MCNC: 1.39 UIU/ML — SIGNIFICANT CHANGE UP (ref 0.27–4.2)
WBC # BLD: 11.02 K/UL — HIGH (ref 3.8–10.5)
WBC # FLD AUTO: 11.02 K/UL — HIGH (ref 3.8–10.5)

## 2020-07-21 PROCEDURE — 99285 EMERGENCY DEPT VISIT HI MDM: CPT

## 2020-07-21 PROCEDURE — 90792 PSYCH DIAG EVAL W/MED SRVCS: CPT

## 2020-07-21 RX ORDER — DIPHENHYDRAMINE HCL 50 MG
50 CAPSULE ORAL ONCE
Refills: 0 | Status: COMPLETED | OUTPATIENT
Start: 2020-07-21 | End: 2020-07-21

## 2020-07-21 RX ORDER — CHLORPROMAZINE HCL 10 MG
50 TABLET ORAL ONCE
Refills: 0 | Status: COMPLETED | OUTPATIENT
Start: 2020-07-21 | End: 2020-07-21

## 2020-07-21 RX ORDER — LITHIUM CARBONATE 300 MG/1
600 TABLET, EXTENDED RELEASE ORAL ONCE
Refills: 0 | Status: COMPLETED | OUTPATIENT
Start: 2020-07-21 | End: 2020-07-21

## 2020-07-21 RX ORDER — TOPIRAMATE 25 MG
200 TABLET ORAL ONCE
Refills: 0 | Status: COMPLETED | OUTPATIENT
Start: 2020-07-21 | End: 2020-07-21

## 2020-07-21 RX ORDER — ACETAMINOPHEN 500 MG
650 TABLET ORAL ONCE
Refills: 0 | Status: COMPLETED | OUTPATIENT
Start: 2020-07-21 | End: 2020-07-21

## 2020-07-21 RX ADMIN — Medication 50 MILLIGRAM(S): at 02:30

## 2020-07-21 RX ADMIN — Medication 200 MILLIGRAM(S): at 09:00

## 2020-07-21 RX ADMIN — Medication 650 MILLIGRAM(S): at 08:30

## 2020-07-21 RX ADMIN — Medication 2 MILLIGRAM(S): at 02:31

## 2020-07-21 RX ADMIN — Medication 650 MILLIGRAM(S): at 04:43

## 2020-07-21 RX ADMIN — LITHIUM CARBONATE 600 MILLIGRAM(S): 300 TABLET, EXTENDED RELEASE ORAL at 09:00

## 2020-07-21 RX ADMIN — LITHIUM CARBONATE 600 MILLIGRAM(S): 300 TABLET, EXTENDED RELEASE ORAL at 00:00
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
## 2020-07-21 NOTE — ED BEHAVIORAL HEALTH NOTE - BEHAVIORAL HEALTH NOTE
Writer spoke to Lars [phone] who states they don't have a staff member Carolina, but they do have someone named Pamela.  He states Pamela will not be there today.
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
## 2020-07-21 NOTE — ED BEHAVIORAL HEALTH NOTE - BEHAVIORAL HEALTH NOTE
Writer was informed pt was medically and psychiatrically cleared for discharge.  Valeria completed.  Writer called Lars the Sr. Coord. Of Operations [phone] who states staff will pick pt up.
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
## 2020-07-21 NOTE — ED BEHAVIORAL HEALTH ASSESSMENT NOTE - VIOLENCE RISK FACTORS:
Irritability/Impulsivity/Community stressors that increase the risk of destabilization/Lack of insight into violence risk/need for treatment/Affective dysregulation
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
## 2020-07-21 NOTE — ED BEHAVIORAL HEALTH ASSESSMENT NOTE - DESCRIPTION
pt upon initial presentation received [id number] and Benadryl 50mg IM for agitation.  Vital Signs Last 24 Hrs  T(C): 36.7 (21 Jul 2020 01:38), Max: 36.7 (21 Jul 2020 01:38)  T(F): 98 (21 Jul 2020 01:38), Max: 98 (21 Jul 2020 01:38)  HR: 79 (21 Jul 2020 01:38) (79 - 79)  BP: 104/82 (21 Jul 2020 01:38) (104/82 - 104/82)  BP(mean): --  RR: 17 (21 Jul 2020 01:38) (17 - 17)  SpO2: 97% (21 Jul 2020 01:38) (97% - 97%) obesity, hypothyroidism, glaucoma Lives in [facility] since Summer 2015

## 2020-07-21 NOTE — ED PROVIDER NOTE - PATIENT PORTAL LINK FT
You can access the FollowMyHealth Patient Portal offered by [facility] by registering at the following website: [email or web address]. By joining [employer]’s FollowMyHealth portal, you will also be able to view your health information using other applications (apps) compatible with our system.

## 2020-07-21 NOTE — ED ADULT NURSE REASSESSMENT NOTE - NS ED NURSE REASSESS COMMENT FT1
Pt was in restroom showering from the sink, unclothed and covered in suds. Pt was initially uncooperative and won't stop soaping stating "I need to shower." Pt eventually stepped outside after being told she will she will be brought in an actual shower room. MD @ scene.
Psych eval completed. Pt remains awake, labile, refusing PO meds as ordered despite multiple attempts. MD Mann made aware. Pending dispo. Will continue to monitor
Received pt from RN break coverage. Pt observed drowsy s/p IM meds received. Labs drawn, NAD, able to follow verbal command. Pending SW and psych consult. Will continue to monitor for safety.
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
## 2020-07-21 NOTE — ED BEHAVIORAL HEALTH ASSESSMENT NOTE - SUICIDE PROTECTIVE FACTORS
Identifies reasons for living/Responsibility to family and others/Supportive social network of family or friends
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
## 2020-07-21 NOTE — ED PROVIDER NOTE - NSFOLLOWUPINSTRUCTIONS_ED_ALL_ED_FT
1. TAKE ALL OF YOUR PRESCRIBED OR OVER THE COUNTER MEDICATIONS AS DIRECTED.    2. FOR PAIN OR FEVER YOU CAN TAKE IBUPROFEN (MOTRIN, ADVIL), NAPROXEN (ALLEVE) OR ACETAMINOPHEN (TYLENOL) AS NEEDED, AS DIRECTED ON PACKAGING.  3. FOLLOW UP WITH YOUR PRIMARY DOCTOR WITHIN 5 DAYS, OR SOONER IF DIRECTED.  4. IF YOU HAD LABS OR IMAGING DONE, YOU WERE GIVEN COPIES OF ALL LABS AND/OR IMAGING RESULTS FROM YOUR ER VISIT--PLEASE TAKE THEM WITH YOU TO YOUR FOLLOW UP APPOINTMENTS.  5. IF NEEDED, CALL PATIENT ACCESS SERVICES AT [phone] TO FIND A PRIMARY CARE PHYSICIAN.  OR CALL [phone] TO MAKE AN APPOINTMENT WITH THE CLINIC.  6. YOU CAN FIND PHYSICIANS OF ALL SPECIALITIES BY VISITING [facility] AND CLICKING ON "FIND A DOCTOR".  7. RETURN TO THE ER FOR ANY WORSENING SYMPTOMS OR CONCERNS.    THANK YOU FOR COMING TO [facility].  HAVE A NICE DAY.

## 2020-07-21 NOTE — ED BEHAVIORAL HEALTH ASSESSMENT NOTE - SAFETY PLAN ADDT'L DETAILS
Safety plan discussed with.../Education provided regarding environmental safety / lethal means restriction/Provision of National Suicide Prevention Lifeline 4-615-410-IDJK ([id number])
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
## 2020-07-21 NOTE — ED PROVIDER NOTE - PHYSICAL EXAMINATION
CONSTITUTIONAL: Well appearing, well nourished, awake, alert  ENMT: Airway patent, head atraumatic  EYES: Clear bilaterally  CARDIAC: Normal rate, regular rhythm.  RESPIRATORY: Breath sounds clear and equal bilaterally.  ABDOMEN: Abdomen soft, non-tender, no guarding  MUSCULOSKELETAL: Spine appears normal, no deformities, equal active FROM bilaterally  NEUROLOGIC: CN II-XII grossly intact, moves all extremities without lateralization  SKIN: Exposed skin normal color for race, warm, dry and intact  PSYCH: Animated, agitated, pressured speech, does not redirect

## 2020-07-21 NOTE — ED BEHAVIORAL HEALTH ASSESSMENT NOTE - THOUGHT ASSOCIATIONS
Called and left message on mom's preferred number to schedule Dagoberto's 1 month Accutane follow up.    Normal
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
## 2020-07-22 NOTE — ED BEHAVIORAL HEALTH NOTE - BEHAVIORAL HEALTH NOTE
High Risk Log: Writer called pt's group home spoke to Sousa [phone] who states pt did not have a good night, ended up calling 911 and is currently in the emergency room at a different hospital with plans to return to the group home.

## 2020-09-19 ENCOUNTER — EMERGENCY (EMERGENCY)
Facility: HOSPITAL | Age: 40
LOS: 1 days | Discharge: HOME | End: 2020-09-19
Admitting: EMERGENCY MEDICINE
Payer: MEDICARE

## 2020-09-19 VITALS
OXYGEN SATURATION: 100 % | SYSTOLIC BLOOD PRESSURE: 148 MMHG | DIASTOLIC BLOOD PRESSURE: 71 MMHG | HEART RATE: 83 BPM | RESPIRATION RATE: 18 BRPM | HEIGHT: 62 IN | TEMPERATURE: 99 F

## 2020-09-19 LAB
APPEARANCE UR: CLEAR — SIGNIFICANT CHANGE UP
BACTERIA # UR AUTO: SIGNIFICANT CHANGE UP
BILIRUB UR-MCNC: NEGATIVE — SIGNIFICANT CHANGE UP
BLOOD UR QL VISUAL: NEGATIVE — SIGNIFICANT CHANGE UP
COLOR SPEC: COLORLESS — SIGNIFICANT CHANGE UP
GLUCOSE UR-MCNC: NEGATIVE — SIGNIFICANT CHANGE UP
HCG UR-SCNC: NEGATIVE — SIGNIFICANT CHANGE UP
HYALINE CASTS # UR AUTO: NEGATIVE — SIGNIFICANT CHANGE UP
KETONES UR-MCNC: NEGATIVE — SIGNIFICANT CHANGE UP
LEUKOCYTE ESTERASE UR-ACNC: SIGNIFICANT CHANGE UP
NITRITE UR-MCNC: NEGATIVE — SIGNIFICANT CHANGE UP
PH UR: 7.5 — SIGNIFICANT CHANGE UP (ref 5–8)
PROT UR-MCNC: NEGATIVE — SIGNIFICANT CHANGE UP
RBC CASTS # UR COMP ASSIST: SIGNIFICANT CHANGE UP (ref 0–?)
SP GR SPEC: 1.01 — SIGNIFICANT CHANGE UP (ref 1–1.04)
SP GR UR: 1.01 — SIGNIFICANT CHANGE UP (ref 1–1.04)
SQUAMOUS # UR AUTO: SIGNIFICANT CHANGE UP
UROBILINOGEN FLD QL: NORMAL — SIGNIFICANT CHANGE UP
WBC UR QL: HIGH (ref 0–?)

## 2020-09-19 PROCEDURE — 12002 RPR S/N/AX/GEN/TRNK2.6-7.5CM: CPT

## 2020-09-19 PROCEDURE — 70450 CT HEAD/BRAIN W/O DYE: CPT | Mod: 26

## 2020-09-19 PROCEDURE — 99285 EMERGENCY DEPT VISIT HI MDM: CPT | Mod: 25

## 2020-09-19 PROCEDURE — 72125 CT NECK SPINE W/O DYE: CPT | Mod: 26

## 2020-09-19 RX ORDER — TETANUS TOXOID, REDUCED DIPHTHERIA TOXOID AND ACELLULAR PERTUSSIS VACCINE, ADSORBED 5; 2.5; 8; 8; 2.5 [IU]/.5ML; [IU]/.5ML; UG/.5ML; UG/.5ML; UG/.5ML
0.5 SUSPENSION INTRAMUSCULAR ONCE
Refills: 0 | Status: COMPLETED | OUTPATIENT
Start: 2020-09-19 | End: 2020-09-19

## 2020-09-19 RX ORDER — CHLORPROMAZINE HCL 10 MG
50 TABLET ORAL ONCE
Refills: 0 | Status: COMPLETED | OUTPATIENT
Start: 2020-09-19 | End: 2020-09-19

## 2020-09-19 RX ADMIN — Medication 2 MILLIGRAM(S): at 15:46

## 2020-09-19 RX ADMIN — Medication 50 MILLIGRAM(S): at 15:46

## 2020-09-19 RX ADMIN — TETANUS TOXOID, REDUCED DIPHTHERIA TOXOID AND ACELLULAR PERTUSSIS VACCINE, ADSORBED 0.5 MILLILITER(S): 5; 2.5; 8; 8; 2.5 SUSPENSION INTRAMUSCULAR at 16:13
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
## 2020-09-19 NOTE — ED ADULT NURSE NOTE - NSIMPLEMENTINTERV_GEN_ALL_ED
Implemented All Universal Safety Interventions:  [place] to call system. Call bell, personal items and telephone within reach. Instruct patient to call for assistance. Room bathroom lighting operational. Non-slip footwear when patient is off stretcher. Physically safe environment: no spills, clutter or unnecessary equipment. Stretcher in lowest position, wheels locked, appropriate side rails in place.
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
## 2020-09-19 NOTE — ED PROVIDER NOTE - NSFOLLOWUPINSTRUCTIONS_ED_ALL_ED_FT
Laceration    A laceration is a cut that goes through all of the layers of the skin and into the tissue that is right under the skin. Some lacerations heal on their own. Others need to be closed with skin adhesive strips, skin glue, stitches (sutures), or staples. Proper laceration care minimizes the risk of infection and helps the laceration to heal better.  If non-absorbable stitches or staples have been placed, they must be taken out within the time frame instructed by your healthcare provider.    SEEK IMMEDIATE MEDICAL CARE IF YOU HAVE ANY OF THE FOLLOWING SYMPTOMS: swelling around the wound, worsening pain, drainage from the wound, red streaking going away from your wound, or discoloration of skin near the laceration.     ***Please have patient reuturn to the ED in 5 days for staple removal.*** Laceration    A laceration is a cut that goes through all of the layers of the skin and into the tissue that is right under the skin. Some lacerations heal on their own. Others need to be closed with skin adhesive strips, skin glue, stitches (sutures), or staples. Proper laceration care minimizes the risk of infection and helps the laceration to heal better.  If non-absorbable stitches or staples have been placed, they must be taken out within the time frame instructed by your healthcare provider.    SEEK IMMEDIATE MEDICAL CARE IF YOU HAVE ANY OF THE FOLLOWING SYMPTOMS: swelling around the wound, worsening pain, drainage from the wound, red streaking going away from your wound, or discoloration of skin near the laceration.     ***Please have patient return to the ED in 5 days for staple removal.***

## 2020-09-19 NOTE — ED PROVIDER NOTE - CLINICAL SUMMARY MEDICAL DECISION MAKING FREE TEXT BOX
40F PMH of mental retardation and Bipolar d/o [facility].  Patient presents screaming, cursing and agitated.  Medications provided to patient to ensure safety of self and others, staples placed for wound closure without incident after washout.  Although no clinical evidence of exam of c-spine, ICH, depressed skull fracture given patient presentation of agitation and patient poor historian due to agitation as well as baseline functioning, will obtain imaging.  If imaging is negative and
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
## 2020-09-19 NOTE — ED PROVIDER NOTE - NSFOLLOWUPCLINICS_GEN_ALL_ED_FT
[facility]  Behavioral Health  [address] [place] [address]  Phone: [phone]  Fax:   Follow Up Time:

## 2020-09-19 NOTE — ED PROVIDER NOTE - PHYSICAL EXAMINATION
3cm linear laceration to left parietal scalp.  No evidence of depressed skull fracture, no haider signs, no raccoon eyes.  No midline spinal tenderness on exam.
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
## 2020-09-19 NOTE — ED PROVIDER NOTE - PROGRESS NOTE DETAILS
Writer spoke to Kellen who is representative from [facility] and discussed patient is ready for discharge.  Kellen will pick patient up.  Writer also told Kellen patient will need to return to the ED in 5 days for staple removal.  Return precautions provided and Kellen verbalized understanding.  Patient currently calm and cooperative.

## 2020-09-24 ENCOUNTER — EMERGENCY (EMERGENCY)
Facility: HOSPITAL | Age: 40
LOS: 1 days | Discharge: HOME | End: 2020-09-24
Attending: STUDENT IN AN ORGANIZED HEALTH CARE EDUCATION/TRAINING PROGRAM | Admitting: EMERGENCY MEDICINE
Payer: MEDICARE

## 2020-09-24 VITALS
HEIGHT: 62 IN | HEART RATE: 86 BPM | DIASTOLIC BLOOD PRESSURE: 89 MMHG | SYSTOLIC BLOOD PRESSURE: 132 MMHG | OXYGEN SATURATION: 99 % | TEMPERATURE: 98 F | RESPIRATION RATE: 15 BRPM

## 2020-09-24 PROCEDURE — L9995: CPT

## 2020-09-24 NOTE — ED PROVIDER NOTE - PATIENT PORTAL LINK FT
You can access the FollowMyHealth Patient Portal offered by [facility] by registering at the following website: [email or web address]. By joining [employer]’s FollowMyHealth portal, you will also be able to view your health information using other applications (apps) compatible with our system.

## 2020-09-24 NOTE — ED PROVIDER NOTE - OBJECTIVE STATEMENT
40F w/ pmh MR, bipolar, well appearing returns for suture removal, accompanied by facility staff, 5 days after 4 staples placed to scalp for laceration s/p fall. Patient has no complaints, no fevers, anything else.
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
## 2020-11-04 NOTE — PROGRESS NOTE BEHAVIORAL HEALTH - NS ED BHA MSE SPEECH RATE
MA was already on phone relay siblings Lab results. Handed phone over to Keke Haas RN. Relayed. Mother verbalized understanding.   
TSH remains slightly high but not in range requiring treatment.  Continue to monitor in clinic
Normal
Fast, difficult to interrupt

## 2020-12-07 ENCOUNTER — TRANSCRIPTION ENCOUNTER (OUTPATIENT)
Age: 40
End: 2020-12-07

## 2020-12-09 ENCOUNTER — RESULT REVIEW (OUTPATIENT)
Age: 40
End: 2020-12-09

## 2020-12-10 ENCOUNTER — APPOINTMENT (OUTPATIENT)
Dept: OBGYN | Facility: HOSPITAL | Age: 40
End: 2020-12-10

## 2020-12-10 ENCOUNTER — OUTPATIENT (OUTPATIENT)
Dept: OUTPATIENT SERVICES | Facility: HOSPITAL | Age: 40
LOS: 1 days | End: 2020-12-10

## 2020-12-10 VITALS
BODY MASS INDEX: 37.65 KG/M2 | HEIGHT: 65 IN | TEMPERATURE: 97.7 F | WEIGHT: 226 LBS | SYSTOLIC BLOOD PRESSURE: 111 MMHG | HEART RATE: 74 BPM | DIASTOLIC BLOOD PRESSURE: 75 MMHG

## 2020-12-10 NOTE — HISTORY OF PRESENT ILLNESS
[FreeTextEntry1] : 39 yo P0 LMP last month with bipolar disorder, mild intellectual disability presents with aid for GYN annual. Is without complaints today. Currently being treated with topical bacitracin and clindamycin for abcess on mons. Per pt is resolving and not bothering her. \par Not sexually active, denies any unwanted sexual contact. \par Last pap 7/2017 WNL, per last notes pt does not easily tolerate spec exam but will tolerate bimanual\par Last mammogram 9/29/20- BIRADS 0 for asymmetry in L breast, followup diagnostic 11/2020 BIRADS 3 likely benign glandular tissue. \par \par new medication list and mammograms scanned into chart

## 2020-12-10 NOTE — DISCUSSION/SUMMARY
[FreeTextEntry1] : 39 yo P0 LMP last month with bipolar disorder, mild intellectual disability presents with aid for GYN annual. Is without complaints today. \par \par 1. GYN annual\par -pap to be repeated 2022\par -routine labs\par -declines STI testing is not sexually active\par -will recheck TFTs, PRL 2/2 hx\par -mammogram reviewed, will schedule followup mammogram 5/2021 for birads 3\par \par RTC 1 year or prn

## 2020-12-11 DIAGNOSIS — E22.1 HYPERPROLACTINEMIA: ICD-10-CM

## 2020-12-11 DIAGNOSIS — E03.9 HYPOTHYROIDISM, UNSPECIFIED: ICD-10-CM

## 2020-12-11 DIAGNOSIS — Z01.419 ENCOUNTER FOR GYNECOLOGICAL EXAMINATION (GENERAL) (ROUTINE) WITHOUT ABNORMAL FINDINGS: ICD-10-CM

## 2020-12-11 LAB
ALBUMIN SERPL ELPH-MCNC: 4.4 G/DL — SIGNIFICANT CHANGE UP (ref 3.3–5)
ALP SERPL-CCNC: 67 U/L — SIGNIFICANT CHANGE UP (ref 40–120)
ALT FLD-CCNC: 18 U/L — SIGNIFICANT CHANGE UP (ref 4–33)
ANION GAP SERPL CALC-SCNC: 13 MMOL/L — SIGNIFICANT CHANGE UP (ref 7–14)
AST SERPL-CCNC: 19 U/L — SIGNIFICANT CHANGE UP (ref 4–32)
BASOPHILS # BLD AUTO: 0.09 K/UL — SIGNIFICANT CHANGE UP (ref 0–0.2)
BASOPHILS NFR BLD AUTO: 0.9 % — SIGNIFICANT CHANGE UP (ref 0–2)
BILIRUB SERPL-MCNC: <0.2 MG/DL — SIGNIFICANT CHANGE UP (ref 0.2–1.2)
BUN SERPL-MCNC: 29 MG/DL — HIGH (ref 7–23)
CALCIUM SERPL-MCNC: 9.8 MG/DL — SIGNIFICANT CHANGE UP (ref 8.4–10.5)
CHLORIDE SERPL-SCNC: 103 MMOL/L — SIGNIFICANT CHANGE UP (ref 98–107)
CO2 SERPL-SCNC: 22 MMOL/L — SIGNIFICANT CHANGE UP (ref 22–31)
CREAT SERPL-MCNC: 1.1 MG/DL — SIGNIFICANT CHANGE UP (ref 0.5–1.3)
EOSINOPHIL # BLD AUTO: 0.22 K/UL — SIGNIFICANT CHANGE UP (ref 0–0.5)
EOSINOPHIL NFR BLD AUTO: 2.1 % — SIGNIFICANT CHANGE UP (ref 0–6)
GLUCOSE SERPL-MCNC: 23 MG/DL — CRITICAL LOW (ref 70–99)
HCT VFR BLD CALC: 40.2 % — SIGNIFICANT CHANGE UP (ref 34.5–45)
HGB BLD-MCNC: 12 G/DL — SIGNIFICANT CHANGE UP (ref 11.5–15.5)
IANC: 6.92 K/UL — SIGNIFICANT CHANGE UP (ref 1.5–8.5)
IMM GRANULOCYTES NFR BLD AUTO: 0.4 % — SIGNIFICANT CHANGE UP (ref 0–1.5)
LYMPHOCYTES # BLD AUTO: 2.46 K/UL — SIGNIFICANT CHANGE UP (ref 1–3.3)
LYMPHOCYTES # BLD AUTO: 23.4 % — SIGNIFICANT CHANGE UP (ref 13–44)
MCHC RBC-ENTMCNC: 29.9 GM/DL — LOW (ref 32–36)
MCHC RBC-ENTMCNC: 31.5 PG — SIGNIFICANT CHANGE UP (ref 27–34)
MCV RBC AUTO: 105.5 FL — HIGH (ref 80–100)
MONOCYTES # BLD AUTO: 0.77 K/UL — SIGNIFICANT CHANGE UP (ref 0–0.9)
MONOCYTES NFR BLD AUTO: 7.3 % — SIGNIFICANT CHANGE UP (ref 2–14)
NEUTROPHILS # BLD AUTO: 6.92 K/UL — SIGNIFICANT CHANGE UP (ref 1.8–7.4)
NEUTROPHILS NFR BLD AUTO: 65.9 % — SIGNIFICANT CHANGE UP (ref 43–77)
NRBC # BLD: 0 /100 WBCS — SIGNIFICANT CHANGE UP
NRBC # FLD: 0 K/UL — SIGNIFICANT CHANGE UP
PLATELET # BLD AUTO: 350 K/UL — SIGNIFICANT CHANGE UP (ref 150–400)
POTASSIUM SERPL-MCNC: 3.6 MMOL/L — SIGNIFICANT CHANGE UP (ref 3.5–5.3)
POTASSIUM SERPL-SCNC: 3.6 MMOL/L — SIGNIFICANT CHANGE UP (ref 3.5–5.3)
PROLACTIN SERPL-MCNC: 13.1 NG/ML — SIGNIFICANT CHANGE UP (ref 3.4–24.1)
PROT SERPL-MCNC: 8.1 G/DL — SIGNIFICANT CHANGE UP (ref 6–8.3)
RBC # BLD: 3.81 M/UL — SIGNIFICANT CHANGE UP (ref 3.8–5.2)
RBC # FLD: 13.3 % — SIGNIFICANT CHANGE UP (ref 10.3–14.5)
SODIUM SERPL-SCNC: 138 MMOL/L — SIGNIFICANT CHANGE UP (ref 135–145)
T4 FREE SERPL-MCNC: 0.8 NG/DL — LOW (ref 0.9–1.8)
TSH SERPL-MCNC: 17.18 UIU/ML — HIGH (ref 0.27–4.2)
WBC # BLD: 10.5 K/UL — SIGNIFICANT CHANGE UP (ref 3.8–10.5)
WBC # FLD AUTO: 10.5 K/UL — SIGNIFICANT CHANGE UP (ref 3.8–10.5)

## 2020-12-12 ENCOUNTER — NON-APPOINTMENT (OUTPATIENT)
Age: 40
End: 2020-12-12

## 2020-12-16 PROBLEM — N76.0 BACTERIAL VAGINOSIS: Status: RESOLVED | Noted: 2018-08-27 | Resolved: 2020-12-16

## 2021-01-04 ENCOUNTER — EMERGENCY (EMERGENCY)
Facility: HOSPITAL | Age: 41
LOS: 0 days | Discharge: HOME | End: 2021-01-04
Payer: MEDICARE

## 2021-01-04 VITALS
DIASTOLIC BLOOD PRESSURE: 71 MMHG | WEIGHT: 220.02 LBS | OXYGEN SATURATION: 100 % | HEART RATE: 73 BPM | HEIGHT: 62 IN | SYSTOLIC BLOOD PRESSURE: 126 MMHG | RESPIRATION RATE: 18 BRPM | TEMPERATURE: 98 F

## 2021-01-04 DIAGNOSIS — E03.9 HYPOTHYROIDISM, UNSPECIFIED: ICD-10-CM

## 2021-01-04 DIAGNOSIS — F31.9 BIPOLAR DISORDER, UNSPECIFIED: ICD-10-CM

## 2021-01-04 DIAGNOSIS — M25.511 PAIN IN RIGHT SHOULDER: ICD-10-CM
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
## 2021-01-04 DIAGNOSIS — S20.212A CONTUSION OF LEFT FRONT WALL OF THORAX, INITIAL ENCOUNTER: ICD-10-CM

## 2021-01-04 DIAGNOSIS — S43.491A OTHER SPRAIN OF RIGHT SHOULDER JOINT, INITIAL ENCOUNTER: ICD-10-CM

## 2021-01-04 DIAGNOSIS — F70 MILD INTELLECTUAL DISABILITIES: ICD-10-CM
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
## 2021-01-04 PROCEDURE — 99284 EMERGENCY DEPT VISIT MOD MDM: CPT

## 2021-01-04 PROCEDURE — 71046 X-RAY EXAM CHEST 2 VIEWS: CPT | Mod: 26

## 2021-01-04 PROCEDURE — 73030 X-RAY EXAM OF SHOULDER: CPT | Mod: 26,RT

## 2021-01-04 RX ORDER — CLONAZEPAM 1 MG
1 TABLET ORAL
Qty: 0 | Refills: 0 | DISCHARGE

## 2021-01-04 RX ORDER — LACTULOSE 10 G/15ML
30 SOLUTION ORAL
Qty: 0 | Refills: 0 | DISCHARGE

## 2021-01-04 RX ORDER — DOCUSATE SODIUM 100 MG
1 CAPSULE ORAL
Qty: 0 | Refills: 0 | DISCHARGE

## 2021-01-04 RX ORDER — LEVOTHYROXINE SODIUM 125 MCG
1 TABLET ORAL
Qty: 0 | Refills: 0 | DISCHARGE

## 2021-01-04 NOTE — ED PROVIDER NOTE - NSCAREINITIATED _GEN_ER
History & Physical  Gynecology      SUBJECTIVE:     Chief Complaint: Vaginal Bleeding       History of Present Illness:  Ms. Murillo is a 41 yr old female who presents for irregular cycles x 2 mos. Of note, she was recently diagnosed with hypothyroidism and was started on synthroid a few months ago. Repeat labs were drawn yesterday and TSH was elevated. She reports history of regular cycles prior to last month. She denies any hx of abnormal paps. Currently sexually active. Denies dizziness, lightheadedness, fatigue. Denies any hx of fibroids or significant GYN pathology.      Review of patient's allergies indicates:  No Known Allergies    Past Medical History:   Diagnosis Date    Anxiety     Depression     Insomnia      Past Surgical History:   Procedure Laterality Date    INCISION AND DRAINAGE OF WOUND  2012    right leg     TONSILLECTOMY       OB History      Para Term  AB Living    2       2      SAB TAB Ectopic Multiple Live Births    1 1              Family History   Problem Relation Age of Onset    No Known Problems Mother     No Known Problems Father     No Known Problems Sister     Cancer Paternal Aunt 72        breast cancer    Breast cancer Neg Hx     Colon cancer Neg Hx     Ovarian cancer Neg Hx      Social History     Tobacco Use    Smoking status: Former Smoker     Packs/day: 0.25     Types: Cigarettes     Start date: 10/10/1995     Last attempt to quit: 10/10/2008     Years since quitting: 10.2    Smokeless tobacco: Never Used   Substance Use Topics    Alcohol use: Yes     Alcohol/week: 1.2 - 1.8 oz     Types: 2 - 3 Glasses of wine per week     Frequency: Monthly or less    Drug use: Yes     Types: Marijuana       Current Outpatient Medications   Medication Sig    levothyroxine (SYNTHROID) 25 MCG tablet Take 1 tablet (25 mcg total) by mouth once daily.    sertraline (ZOLOFT) 100 MG tablet Take 1.5 tablets (150 mg total) by mouth once daily.    traZODone (DESYREL) 100 MG  tablet     LORazepam (ATIVAN) 1 MG tablet      No current facility-administered medications for this visit.          Review of Systems:  Review of Systems   Constitutional: Negative for activity change, fatigue, fever and unexpected weight change.   Respiratory: Negative for cough and shortness of breath.    Cardiovascular: Negative for chest pain and palpitations.   Gastrointestinal: Negative for abdominal pain, constipation, diarrhea and nausea.   Endocrine: Negative for hot flashes.   Genitourinary: Positive for menstrual problem. Negative for dyspareunia, dysuria, menorrhagia, pelvic pain and vaginal discharge.   Musculoskeletal: Negative for back pain.   Neurological: Negative for headaches.   Psychiatric/Behavioral: The patient is not nervous/anxious.    Breast: Negative for nipple discharge       OBJECTIVE:     Physical Exam:  Physical Exam   Constitutional: She is oriented to person, place, and time. She appears well-developed and well-nourished.   HENT:   Head: Normocephalic and atraumatic.   Neck: Normal range of motion. Neck supple.   Cardiovascular: Normal rate, regular rhythm, normal heart sounds and intact distal pulses.   Pulmonary/Chest: Effort normal and breath sounds normal.   Abdominal: Soft. Bowel sounds are normal. There is no tenderness. There is no guarding.   Genitourinary: There is no rash, tenderness, lesion or injury on the right labia. There is no rash, tenderness, lesion or injury on the left labia. Uterus is not deviated, not enlarged, not fixed and not tender. Cervix exhibits no motion tenderness, no discharge and no friability. Right adnexum displays no mass, no tenderness and no fullness. Left adnexum displays no mass, no tenderness and no fullness. No erythema, tenderness or bleeding in the vagina. No foreign body in the vagina. No signs of injury around the vagina. No vaginal discharge found.   Neurological: She is alert and oriented to person, place, and time.   Skin: Skin is  warm and dry.   Psychiatric: She has a normal mood and affect.   Vitals reviewed.        ASSESSMENT:       ICD-10-CM ICD-9-CM    1. Irregular periods/menstrual cycles N92.6 626.4           Plan:      Irregular cycles x 2 mos  New diagnosis of hypothyroidism. Currently on synthroid  Repeat labs yesterday show elevated TSH with low/normal T4. Will probably need increase in synthroid dose. Follow with PCP  If continued irregular bleeding once hypothyroidism is corrected, RTC to clinic for endometrial biopsy    Counseling time: 15 minutes    Tito Spence   Ankur, Gillian HARTMAN)
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
## 2021-01-04 NOTE — ED PROVIDER NOTE - PATIENT PORTAL LINK FT
You can access the FollowMyHealth Patient Portal offered by [facility] by registering at the following website: [email or web address]. By joining [employer]’s FollowMyHealth portal, you will also be able to view your health information using other applications (apps) compatible with our system.

## 2021-01-04 NOTE — ED PROVIDER NOTE - CLINICAL SUMMARY MEDICAL DECISION MAKING FREE TEXT BOX
Likely contusion, will check xray for underlying injury, patient appears comfortable and in no distress, if workup negative anticipate discharge with Ibuprofen PRn for pain

## 2021-01-04 NOTE — ED PROVIDER NOTE - CARE PLAN
Principal Discharge DX:	Sprain of other part of right shoulder region, initial encounter  Secondary Diagnosis:	Chest wall contusion, left, initial encounter

## 2021-01-26 ENCOUNTER — OUTPATIENT (OUTPATIENT)
Dept: OUTPATIENT SERVICES | Facility: HOSPITAL | Age: 41
LOS: 1 days | End: 2021-01-26
Payer: MEDICARE

## 2021-01-26 ENCOUNTER — APPOINTMENT (OUTPATIENT)
Dept: ULTRASOUND IMAGING | Facility: CLINIC | Age: 41
End: 2021-01-26
Payer: MEDICARE

## 2021-01-26 DIAGNOSIS — Z01.419 ENCOUNTER FOR GYNECOLOGICAL EXAMINATION (GENERAL) (ROUTINE) WITHOUT ABNORMAL FINDINGS: ICD-10-CM

## 2021-01-26 PROCEDURE — 76856 US EXAM PELVIC COMPLETE: CPT

## 2021-01-26 PROCEDURE — 76856 US EXAM PELVIC COMPLETE: CPT | Mod: 26

## 2021-03-04 ENCOUNTER — APPOINTMENT (OUTPATIENT)
Dept: OBGYN | Facility: HOSPITAL | Age: 41
End: 2021-03-04

## 2021-03-09 ENCOUNTER — RESULT REVIEW (OUTPATIENT)
Age: 41
End: 2021-03-09

## 2021-03-09 ENCOUNTER — APPOINTMENT (OUTPATIENT)
Dept: OBGYN | Facility: HOSPITAL | Age: 41
End: 2021-03-09

## 2021-03-09 ENCOUNTER — OUTPATIENT (OUTPATIENT)
Dept: OUTPATIENT SERVICES | Facility: HOSPITAL | Age: 41
LOS: 1 days | End: 2021-03-09

## 2021-03-09 VITALS
HEIGHT: 65 IN | DIASTOLIC BLOOD PRESSURE: 87 MMHG | HEART RATE: 91 BPM | BODY MASS INDEX: 40.82 KG/M2 | SYSTOLIC BLOOD PRESSURE: 117 MMHG | TEMPERATURE: 98.4 F | WEIGHT: 245 LBS

## 2021-03-09 NOTE — HISTORY OF PRESENT ILLNESS
[FreeTextEntry1] : 39 yo P0 LMP last month with bipolar disorder, mild intellectual disability presents with aid for followup after TVUS done 1/26/21. TVUS WNL, with uterus measuring 8.5 x 3.6 x 5.1, 6 mm endometrium. 2 cm physiologic cyst on L ovary. \par Followup mammo/sono for BIRADS 3 L breast mammogram 11/2020 due 5/2021. \par Pap due 7/2022, historically pt unable to tolerate pelvic exam\par Following up with endocrine this month, ~25 lb weight gain noted, high TSH likely contributory. Pt endorses increased appetite, dry mouth but attributes this to psych medications.

## 2021-03-09 NOTE — DISCUSSION/SUMMARY
[FreeTextEntry1] : 39 yo P0 LMP last month with bipolar disorder, mild intellectual diability presents to discuss rsults of TVUS done 1/26/21. \par \par 1. Sono\par -reviewed with patient, normal \par -will repeat in 1 year\par -should attempt pap at that time if pt comfortable as is due 7/2022, if not can scheduled EUA\par \par 2. Hypothyroidism\par -results again reviewed w patient and aid, TSH elevated to 17.18\par -likely contributing to weight gain\par -reinforced importance of endo followup as scheduled\par \par 3. BIRADS 3 mammogram\par -L breast mammo/sono ordered\par -pt wishes to have this done at our facility, pt to bring results of previous mammograms to visit\par \par RTC 1 year or prn

## 2021-03-11 DIAGNOSIS — Z71.2 PERSON CONSULTING FOR EXPLANATION OF EXAMINATION OR TEST FINDINGS: ICD-10-CM

## 2021-03-11 DIAGNOSIS — E03.9 HYPOTHYROIDISM, UNSPECIFIED: ICD-10-CM

## 2021-03-15 ENCOUNTER — EMERGENCY (EMERGENCY)
Facility: HOSPITAL | Age: 41
LOS: 1 days | Discharge: HOME | End: 2021-03-15
Attending: EMERGENCY MEDICINE
Payer: MEDICARE

## 2021-03-15 VITALS
SYSTOLIC BLOOD PRESSURE: 111 MMHG | DIASTOLIC BLOOD PRESSURE: 52 MMHG | OXYGEN SATURATION: 100 % | HEART RATE: 78 BPM | RESPIRATION RATE: 20 BRPM

## 2021-03-15 VITALS — WEIGHT: 279.99 LBS | HEIGHT: 62 IN

## 2021-03-15 DIAGNOSIS — F39 UNSPECIFIED MOOD [AFFECTIVE] DISORDER: ICD-10-CM

## 2021-03-15 DIAGNOSIS — F43.20 ADJUSTMENT DISORDER, UNSPECIFIED: ICD-10-CM

## 2021-03-15 DIAGNOSIS — F79 UNSPECIFIED INTELLECTUAL DISABILITIES: ICD-10-CM

## 2021-03-15 LAB — SARS-COV-2 RNA SPEC QL NAA+PROBE: SIGNIFICANT CHANGE UP

## 2021-03-15 PROCEDURE — 73130 X-RAY EXAM OF HAND: CPT | Mod: 26,LT

## 2021-03-15 PROCEDURE — 99285 EMERGENCY DEPT VISIT HI MDM: CPT | Mod: 25

## 2021-03-15 PROCEDURE — U0003: CPT

## 2021-03-15 PROCEDURE — U0005: CPT

## 2021-03-15 PROCEDURE — 90792 PSYCH DIAG EVAL W/MED SRVCS: CPT

## 2021-03-15 PROCEDURE — 70450 CT HEAD/BRAIN W/O DYE: CPT

## 2021-03-15 PROCEDURE — 70450 CT HEAD/BRAIN W/O DYE: CPT | Mod: 26,MA

## 2021-03-15 PROCEDURE — 99284 EMERGENCY DEPT VISIT MOD MDM: CPT | Mod: CS,GC

## 2021-03-15 PROCEDURE — 73130 X-RAY EXAM OF HAND: CPT

## 2021-03-15 RX ORDER — ACETAMINOPHEN 500 MG
650 TABLET ORAL ONCE
Refills: 0 | Status: COMPLETED | OUTPATIENT
Start: 2021-03-15 | End: 2021-03-15

## 2021-03-15 RX ADMIN — Medication 650 MILLIGRAM(S): at 12:33

## 2021-03-15 RX ADMIN — Medication 650 MILLIGRAM(S): at 12:34
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
## 2021-03-15 NOTE — ED BEHAVIORAL HEALTH ASSESSMENT NOTE - CURRENT MEDICATION
Thorazine 50mg q6, Topamax 200mg BID, Klonopin 1mg TID, Lithium 450mg BID, Seroquel 200mg qHS - per ED provider's note

## 2021-03-15 NOTE — ED BEHAVIORAL HEALTH ASSESSMENT NOTE - DESCRIPTION
yelling, crying, but not physically combative; has not required prns for agitation none single, no dependents, disabled, lives at [facility] for the past 5 years
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
## 2021-03-15 NOTE — ED BEHAVIORAL HEALTH ASSESSMENT NOTE - SAFETY PLAN ADDT'L DETAILS
Safety plan discussed with.../Provision of National Suicide Prevention Lifeline 3-312-156-TALK (8886)

## 2021-03-15 NOTE — ED PROVIDER NOTE - PROGRESS NOTE DETAILS
Khanh, PGY1: CT unremarkable; will await  recs Khanh, PGY1: CT unremarkable; will await SW recs and consult psych for possible worsening bipolar Khanh, PGY1: Psych aware; will evaluate patient Khanh, PGY1: Per psych, patient is cleared psychiatrically. As per SW recommendation, patient is cleared to go home as well
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
## 2021-03-15 NOTE — ED PROVIDER NOTE - INTERNATIONAL TRAVEL
Continue on atenolol  Dizziness has resolved with this and BP is still controlled   Depressed mood has also improved No

## 2021-03-15 NOTE — ED PROVIDER NOTE - PHYSICAL EXAMINATION
GENERAL: well appearing in no acute distress, non-toxic appearing  HEAD: normocephalic, atraumatic  HENT: airway intact; bruise over left cheek mildly TTP; no step offs  EYES: normal conjunctiva, PERRLA, EOMI,  CARDIAC: regular rate and rhythm, normal S1S2, no appreciable murmurs, 2+ pulses in UE/LE b/l  PULM: normal breath sounds, clear to ascultation bilaterally, no rales, rhonchi, wheezing  GI: abdomen nondistended, soft, nontender, no guarding, rebound tenderness  NEURO: no focal motor or sensory deficits  MSK: no peripheral edema, no calf tenderness b/l  SKIN: well-perfused, extremities warm, no visible rashes  PSYCH: appropriate mood and affect
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
## 2021-03-15 NOTE — ED PROVIDER NOTE - NS ED ROS FT
General: denies fever, chills  HENT: denies nasal congestion, rhinorrhea  Eyes: denies visual changes, blurred vision  CV: denies chest pain, palpitations  Resp: denies difficulty breathing, cough  Abdominal: denies nausea, vomiting, diarrhea, abdominal pain  : denies urinary pain or discharge  MSK: denies muscle aches, leg swelling  Neuro: positive headaches, no numbness or tingling  Skin: denies rashes, bruises
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
## 2021-03-15 NOTE — ED BEHAVIORAL HEALTH ASSESSMENT NOTE - NSSUICPROTFACT_PSY_ALL_CORE
Identifies reasons for living/Supportive social network of family or friends/Positive therapeutic relationships/Ability to cope with stress/Sikhism beliefs
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
## 2021-03-15 NOTE — ED PROVIDER NOTE - PATIENT PORTAL LINK FT
You can access the FollowMyHealth Patient Portal offered by [facility] by registering at the following website: [email or web address]. By joining [employer]’s FollowMyHealth portal, you will also be able to view your health information using other applications (apps) compatible with our system.
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
## 2021-03-24 ENCOUNTER — APPOINTMENT (OUTPATIENT)
Dept: ENDOCRINOLOGY | Facility: CLINIC | Age: 41
End: 2021-03-24
Payer: MEDICARE

## 2021-03-24 VITALS
OXYGEN SATURATION: 98 % | WEIGHT: 245 LBS | BODY MASS INDEX: 40.82 KG/M2 | DIASTOLIC BLOOD PRESSURE: 78 MMHG | HEIGHT: 65 IN | TEMPERATURE: 97.6 F | SYSTOLIC BLOOD PRESSURE: 110 MMHG | HEART RATE: 96 BPM

## 2021-03-24 PROCEDURE — 99214 OFFICE O/P EST MOD 30 MIN: CPT

## 2021-03-24 NOTE — ASSESSMENT
[FreeTextEntry1] : 40 year old female with history of bipolar disease here for follow up of hyperprolactinemia. Overall it appears that that given her MRI is negative high likelihood this is anti-psychotic related hyperprolactinemia \par Will check her prolactin levels, no signs of hyperprolactinemia \par Will be checking thyroid function, Ob/gyn recent noted derangement to 17.18 in 12/2020, will repeat today and make adjustments \par \par -Check prolactin levels, TFTs\par -Continue Levothyroxine dosing at 137 mcg for now (1.5 tabs on sunday), will adjust accordingly \par -Will be called back if dosage adjustment is necessary\par \par -Follow up in 6 months
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
## 2021-03-24 NOTE — REVIEW OF SYSTEMS
[Fatigue] : no fatigue [Decreased Appetite] : appetite not decreased [Recent Weight Gain (___ Lbs)] : no recent weight gain [Recent Weight Loss (___ Lbs)] : no recent weight loss [Visual Field Defect] : no visual field defect [Dry Eyes] : no dryness [Dysphagia] : no dysphagia [Neck Pain] : no neck pain [Dysphonia] : no dysphonia [Chest Pain] : no chest pain [Slow Heart Rate] : heart rate is not slow [Palpitations] : no palpitations [Fast Heart Rate] : heart rate is not fast [Shortness Of Breath] : no shortness of breath [Cough] : no cough [Nausea] : no nausea [Constipation] : no constipation [Vomiting] : no vomiting [Diarrhea] : no diarrhea [Polyuria] : no polyuria [Irregular Menses] : regular menses [Joint Pain] : no joint pain [Muscle Weakness] : no muscle weakness [Acanthosis] : no acanthosis  [Headaches] : no headaches [Dizziness] : no dizziness [Tremors] : no tremors [Pain/Numbness of Digits] : no pain/numbness of digits [Depression] : no depression [Polydipsia] : no polydipsia [Cold Intolerance] : no cold intolerance [Easy Bleeding] : no ~M tendency for easy bleeding [Easy Bruising] : no tendency for easy bruising

## 2021-03-26 ENCOUNTER — NON-APPOINTMENT (OUTPATIENT)
Age: 41
End: 2021-03-26

## 2021-03-26 LAB
FSH SERPL-MCNC: 13.4 IU/L
IGF-1 INTERP: NORMAL
IGF-I BLD-MCNC: 164 NG/ML
LH SERPL-ACNC: 8.2 IU/L
PROLACTIN SERPL-MCNC: 14.2 NG/ML
T4 FREE SERPL-MCNC: 0.9 NG/DL
TSH SERPL-ACNC: 4.67 UIU/ML

## 2021-03-26 RX ORDER — LEVOTHYROXINE SODIUM 0.14 MG/1
137 TABLET ORAL DAILY
Qty: 30 | Refills: 2 | Status: COMPLETED | COMMUNITY
Start: 2018-08-13 | End: 2021-03-26

## 2021-03-26 RX ORDER — LEVOTHYROXINE SODIUM 0.14 MG/1
137 TABLET ORAL DAILY
Qty: 30 | Refills: 2 | Status: COMPLETED | COMMUNITY
Start: 2018-05-08 | End: 2021-03-26

## 2021-05-04 ENCOUNTER — EMERGENCY (EMERGENCY)
Facility: HOSPITAL | Age: 41
LOS: 1 days | Discharge: HOME | End: 2021-05-04
Attending: EMERGENCY MEDICINE | Admitting: EMERGENCY MEDICINE
Payer: MEDICARE

## 2021-05-04 VITALS
DIASTOLIC BLOOD PRESSURE: 74 MMHG | HEIGHT: 62 IN | SYSTOLIC BLOOD PRESSURE: 123 MMHG | RESPIRATION RATE: 18 BRPM | HEART RATE: 88 BPM | TEMPERATURE: 98 F | OXYGEN SATURATION: 98 %

## 2021-05-04 PROCEDURE — 99283 EMERGENCY DEPT VISIT LOW MDM: CPT | Mod: CS

## 2021-05-04 RX ORDER — IBUPROFEN 200 MG
600 TABLET ORAL ONCE
Refills: 0 | Status: COMPLETED | OUTPATIENT
Start: 2021-05-04 | End: 2021-05-04

## 2021-05-04 RX ADMIN — Medication 600 MILLIGRAM(S): at 17:21
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
## 2021-05-04 NOTE — ED PROVIDER NOTE - PATIENT PORTAL LINK FT
You can access the FollowMyHealth Patient Portal offered by [facility] by registering at the following website: [email or web address]. By joining "[employer]’s FollowMyHealth portal, you will also be able to view your health information using other applications (apps) compatible with our system.
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
## 2021-05-04 NOTE — ED PROVIDER NOTE - PHYSICAL EXAMINATION
left periorbital ecchymoses, scant left eye alisa  perrla, eomi  left big toe:  mild ttp, no skin changes, no swelling, no deformity, full rom, full wt bearing  left side of lips:  mild abrasion  no loose dentition

## 2021-05-17 ENCOUNTER — EMERGENCY (EMERGENCY)
Facility: HOSPITAL | Age: 41
LOS: 1 days | Discharge: HOME | End: 2021-05-17
Attending: EMERGENCY MEDICINE | Admitting: EMERGENCY MEDICINE
Payer: MEDICARE

## 2021-05-17 VITALS
OXYGEN SATURATION: 100 % | DIASTOLIC BLOOD PRESSURE: 71 MMHG | SYSTOLIC BLOOD PRESSURE: 126 MMHG | RESPIRATION RATE: 16 BRPM | HEIGHT: 62 IN | HEART RATE: 80 BPM | TEMPERATURE: 98 F

## 2021-05-17 PROCEDURE — 71046 X-RAY EXAM CHEST 2 VIEWS: CPT | Mod: 26

## 2021-05-17 PROCEDURE — 70450 CT HEAD/BRAIN W/O DYE: CPT | Mod: 26

## 2021-05-17 PROCEDURE — 99284 EMERGENCY DEPT VISIT MOD MDM: CPT | Mod: GC

## 2021-05-17 PROCEDURE — 72125 CT NECK SPINE W/O DYE: CPT | Mod: 26

## 2021-05-17 PROCEDURE — 70486 CT MAXILLOFACIAL W/O DYE: CPT | Mod: 26

## 2021-05-17 RX ORDER — ACETAMINOPHEN 500 MG
650 TABLET ORAL ONCE
Refills: 0 | Status: COMPLETED | OUTPATIENT
Start: 2021-05-17 | End: 2021-05-17
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
## 2021-05-17 NOTE — ED PROVIDER NOTE - PHYSICAL EXAMINATION
Dr Cassidy  Pt noted able to ambulate wo difficulty. Pt AO3, very tearful and upset during interview  EOMI. +bruise on the left side of eyebrow, no lac. no sign of entrapment. supple neck. no epistaxis no lose teeth.  no work of breathing, no retractions. +circular bruise on the lateral chest wall, no crepitus. tender to touch.   obese female nontender abdomen. no guarding or rebound. no ecchymosis of abdomen. stable pelvis  no lac of hands/arms. no shortening or rotation of bl lower ext. Dr Cassidy  Pt noted able to ambulate wo difficulty. Pt AO3, very tearful and upset during interview  EOMI. +bruise on the left side of eyebrow, no lac. no sign of entrapment. supple neck. no epistaxis no lose teeth.  no work of breathing, no retractions. +circular bruise on the lateral chest wall, no crepitus. tender to touch.   obese female nontender abdomen. no guarding or rebound. no ecchymosis of abdomen. stable pelvis  no lac of hands/arms. no shortening or rotation of bl lower ext.    Dr. Craven PGY1  PHYSICAL EXAM:  GENERAL: non-toxic appearing, tearful; in no respiratory distress  HEENT: Normocephalic, +ecchymosis to L eyebrow; PERRL, EOMs intact b/l w/out deficits, no conjunctival pallor, MMM  NECK: No JVD; FROM  CHEST/LUNG: CTAB no wheezes/rhonchi/rales; +ecchymosis to lateral chest wall w/ mild ttp; no crepitus  HEART: RRR no murmur/gallops/rubs  ABDOMEN: +BS, soft, NT, ND, no palpable masses, no abominal ecchymosis  EXTREMITIES: No LE edema, +2 radial pulses b/l, +2 DP/PT pulses b/l  MUSCULOSKELETAL: FROM of all 4 extremities  NERVOUS SYSTEM:  A&Ox3, No motor deficits or sensory deficits; CNII-XII intact; no focal neurologic deficits  SKIN:  No new rashes

## 2021-05-17 NOTE — ED PROVIDER NOTE - PATIENT PORTAL LINK FT
You can access the FollowMyHealth Patient Portal offered by [facility] by registering at the following website: [email or web address]. By joining [employer]’s FollowMyHealth portal, you will also be able to view your health information using other applications (apps) compatible with our system.
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
## 2021-05-17 NOTE — ED PROVIDER NOTE - PROGRESS NOTE DETAILS
pt pending imaging, SW spoke w manager at group home will fill [facility] w [facility]. pt stable. Endorse to night team pt pending imaging to be done, SW spoke w manager at group home will fill report w [facility]. pt stable. Endorse to night team

## 2021-05-18 VITALS
SYSTOLIC BLOOD PRESSURE: 120 MMHG | RESPIRATION RATE: 18 BRPM | OXYGEN SATURATION: 96 % | TEMPERATURE: 98 F | HEART RATE: 66 BPM | DIASTOLIC BLOOD PRESSURE: 82 MMHG

## 2021-05-18 RX ADMIN — Medication 650 MILLIGRAM(S): at 00:22

## 2021-05-18 NOTE — PROVIDER CONTACT NOTE (OTHER) - SITUATION
Call back received at approximately 23:40 from Behavioral Manager, Justice, and from  Shahana Cassidy.
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
## 2021-05-18 NOTE — ED ADULT NURSE REASSESSMENT NOTE - NS ED NURSE REASSESS COMMENT FT1
pt awake and alert with cheerful disposition, playing video games on portable device. staff at bedside.
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
## 2021-06-07 NOTE — ED ADULT NURSE NOTE - CHIEF COMPLAINT
Action Requested: Summary for Provider     []  Critical Lab, Recommendations Needed  [] Need Additional Advice  [x]   FYI    []   Need Orders  [] Need Medications Sent to Pharmacy  []  Other     SUMMARY: Head injury a week ago; has appt 3:30 today    Sandra The patient is a 38y Female complaining of cough.

## 2021-08-24 ENCOUNTER — APPOINTMENT (OUTPATIENT)
Dept: MAMMOGRAPHY | Facility: CLINIC | Age: 41
End: 2021-08-24
Payer: MEDICARE

## 2021-08-24 ENCOUNTER — RESULT REVIEW (OUTPATIENT)
Age: 41
End: 2021-08-24

## 2021-08-24 ENCOUNTER — APPOINTMENT (OUTPATIENT)
Dept: ULTRASOUND IMAGING | Facility: CLINIC | Age: 41
End: 2021-08-24
Payer: MEDICARE

## 2021-08-24 ENCOUNTER — OUTPATIENT (OUTPATIENT)
Dept: OUTPATIENT SERVICES | Facility: HOSPITAL | Age: 41
LOS: 1 days | End: 2021-08-24
Payer: MEDICARE

## 2021-08-24 DIAGNOSIS — Z12.31 ENCOUNTER FOR SCREENING MAMMOGRAM FOR MALIGNANT NEOPLASM OF BREAST: ICD-10-CM

## 2021-08-24 PROCEDURE — G0279: CPT | Mod: 26

## 2021-08-24 PROCEDURE — 76641 ULTRASOUND BREAST COMPLETE: CPT

## 2021-08-24 PROCEDURE — 77065 DX MAMMO INCL CAD UNI: CPT | Mod: 26,LT

## 2021-08-24 PROCEDURE — G0279: CPT

## 2021-08-24 PROCEDURE — 77065 DX MAMMO INCL CAD UNI: CPT

## 2021-08-25 ENCOUNTER — RESULT REVIEW (OUTPATIENT)
Age: 41
End: 2021-08-25

## 2021-08-25 PROCEDURE — 76641 ULTRASOUND BREAST COMPLETE: CPT | Mod: 26,LT

## 2021-09-01 NOTE — ED PROVIDER NOTE - CHIEF COMPLAINT
The patient is a 38y Female complaining of pain, lower leg. Render In Strict Bullet Format?: No Detail Level: Zone Initiate Treatment: Ketoconazole 2% cream Apply to affected skin bid for 2 weeks

## 2021-09-02 ENCOUNTER — TRANSCRIPTION ENCOUNTER (OUTPATIENT)
Age: 41
End: 2021-09-02

## 2021-09-20 ENCOUNTER — APPOINTMENT (OUTPATIENT)
Dept: ENDOCRINOLOGY | Facility: CLINIC | Age: 41
End: 2021-09-20
Payer: MEDICARE

## 2021-09-20 VITALS
HEART RATE: 88 BPM | BODY MASS INDEX: 42.93 KG/M2 | SYSTOLIC BLOOD PRESSURE: 122 MMHG | TEMPERATURE: 98.4 F | OXYGEN SATURATION: 99 % | WEIGHT: 258 LBS | DIASTOLIC BLOOD PRESSURE: 78 MMHG

## 2021-09-20 PROCEDURE — 99214 OFFICE O/P EST MOD 30 MIN: CPT
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
## 2021-09-20 NOTE — PHYSICAL EXAM
[Alert] : alert [Well Nourished] : well nourished [No Acute Distress] : no acute distress [Normal Sclera/Conjunctiva] : normal sclera/conjunctiva [EOMI] : extra ocular movement intact [Normal Outer Ear/Nose] : the ears and nose were normal in appearance [PERRL] : pupils equal, round and reactive to light [Normal Hearing] : hearing was normal [Normal TMs] : both tympanic membranes were normal [No Neck Mass] : no neck mass was observed [Thyroid Not Enlarged] : the thyroid was not enlarged [No Respiratory Distress] : no respiratory distress [Clear to Auscultation] : lungs were clear to auscultation bilaterally [Normal S1, S2] : normal S1 and S2 [Normal Rate] : heart rate was normal [Regular Rhythm] : with a regular rhythm [Normal Bowel Sounds] : normal bowel sounds [Not Tender] : non-tender [Soft] : abdomen soft [Normal Gait] : normal gait [No Joint Swelling] : no joint swelling seen [No Clubbing, Cyanosis] : no clubbing  or cyanosis of the fingernails [Normal Strength/Tone] : muscle strength and tone were normal [No Rash] : no rash [No Skin Lesions] : no skin lesions [No Motor Deficits] : the motor exam was normal [Normal Reflexes] : deep tendon reflexes were 2+ and symmetric [No Tremors] : no tremors [Oriented x3] : oriented to person, place, and time [Normal Affect] : the affect was normal [Normal Insight/Judgement] : insight and judgment were intact [Normal Mood] : the mood was normal

## 2021-09-20 NOTE — ASSESSMENT
[FreeTextEntry1] : 41 year old female with history of bipolar disease here for follow up of hyperprolactinemia. 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 \par \par [phone] ( Nurse Skyla Rockwell) \par \par \par   \par

## 2021-09-21 LAB
IGF-1 INTERP: NORMAL
IGF-I BLD-MCNC: 146 NG/ML
PROLACTIN SERPL-MCNC: 11.4 NG/ML
T4 FREE SERPL-MCNC: 0.9 NG/DL
TSH SERPL-ACNC: 6.3 UIU/ML

## 2021-09-29 RX ORDER — LEVOTHYROXINE SODIUM 0.14 MG/1
137 TABLET ORAL
Qty: 102 | Refills: 2 | Status: DISCONTINUED | COMMUNITY
Start: 2018-12-13 | End: 2021-09-29

## 2021-09-29 RX ORDER — LEVOTHYROXINE SODIUM 0.12 MG/1
125 TABLET ORAL
Qty: 90 | Refills: 3 | Status: DISCONTINUED | COMMUNITY
Start: 2018-02-07 | End: 2021-09-29

## 2021-11-05 ENCOUNTER — TRANSCRIPTION ENCOUNTER (OUTPATIENT)
Age: 41
End: 2021-11-05

## 2021-12-10 ENCOUNTER — OUTPATIENT (OUTPATIENT)
Dept: OUTPATIENT SERVICES | Facility: HOSPITAL | Age: 41
LOS: 1 days | End: 2021-12-10

## 2021-12-10 ENCOUNTER — APPOINTMENT (OUTPATIENT)
Dept: OBGYN | Facility: HOSPITAL | Age: 41
End: 2021-12-10

## 2021-12-10 VITALS
WEIGHT: 269 LBS | DIASTOLIC BLOOD PRESSURE: 69 MMHG | HEART RATE: 89 BPM | HEIGHT: 62 IN | SYSTOLIC BLOOD PRESSURE: 133 MMHG | TEMPERATURE: 98.5 F | BODY MASS INDEX: 49.5 KG/M2

## 2021-12-10 DIAGNOSIS — R92.8 OTHER ABNORMAL AND INCONCLUSIVE FINDINGS ON DIAGNOSTIC IMAGING OF BREAST: ICD-10-CM

## 2021-12-10 DIAGNOSIS — Z01.419 ENCOUNTER FOR GYNECOLOGICAL EXAMINATION (GENERAL) (ROUTINE) WITHOUT ABNORMAL FINDINGS: ICD-10-CM

## 2021-12-10 NOTE — PHYSICAL EXAM
[Chaperone Present] : A chaperone was present in the examining room during all aspects of the physical examination [Appropriately responsive] : appropriately responsive [Alert] : alert [No Acute Distress] : no acute distress [No Lymphadenopathy] : no lymphadenopathy [Regular Rate Rhythm] : regular rate rhythm [No Murmurs] : no murmurs [Clear to Auscultation B/L] : clear to auscultation bilaterally [Soft] : soft [Non-tender] : non-tender [Non-distended] : non-distended [No HSM] : No HSM [No Lesions] : no lesions [No Mass] : no mass [Oriented x3] : oriented x3 [Examination Of The Breasts] : a normal appearance [Normal] : normal [No Masses] : no breast masses were palpable [FreeTextEntry1] : RENEE Marquez

## 2021-12-10 NOTE — DISCUSSION/SUMMARY
[FreeTextEntry1] : 40 yo P0 presents with aid for GYN annual without complaint. \par \par 1. GYN annual\par -pap due 7/2022\par -routine labs deferred as done with endo\par -STI testing deferred\par -due for followup 6 month mammogram L breast and screening R breast 2/2022, pt and aid informed to schedule this\par \par RTC 1 year or prn

## 2021-12-10 NOTE — HISTORY OF PRESENT ILLNESS
[FreeTextEntry1] : 40 yo P0 LMP beginning of december presents for GYN annual. She presents today with her aid from her group home. She is feeling well and in good spirits. She denies any sexual contact or need for STI testing. \par Last pap 7/2017 negative\par L diagnostic mammogram done 8/2021 BIRADS-3, recommend 6 month followup to ascertain one year stability. Is also due for R screening at that time.  [No] : Patient does not have concerns regarding sex [Previously active] : previously active

## 2021-12-17 ENCOUNTER — NON-APPOINTMENT (OUTPATIENT)
Age: 41
End: 2021-12-17

## 2021-12-17 LAB
T4 FREE SERPL-MCNC: 0.9 NG/DL
TSH SERPL-ACNC: 12.8 UIU/ML

## 2022-01-01 NOTE — ED BEHAVIORAL HEALTH ASSESSMENT NOTE - PROFESSIONAL COLLATERAL NAME
Delivery Date: 2022   Delivery Time: 7:04 AM   Delivery Type: , Low Transverse     Maternal History:  Boy Katerina Francisco is a 3 days day old 39w1d   born to a mother who is a 38 y.o.   . She has a past medical history of Contraception, device intrauterine (2019). .     Prenatal Labs Review:  ABO/Rh:   Lab Results   Component Value Date/Time    GROUPTRH A POS 2022 06:30 AM      Group B Beta Strep:   Lab Results   Component Value Date/Time    STREPBCULT No Group B Streptococcus isolated 2022 10:34 AM      HIV: 2022: HIV 1/2 Ag/Ab Non-reactive (Ref range: Non-reactive)  RPR:   Lab Results   Component Value Date/Time    RPR Non-reactive 2022 10:42 AM      Hepatitis B Surface Antigen:   Lab Results   Component Value Date/Time    HEPBSAG Negative 2022 10:15 AM      Rubella Immune Status:   Lab Results   Component Value Date/Time    RUBELLAIMMUN Reactive 2022 10:15 AM        Pregnancy/Delivery Course:  The pregnancy was complicated by AMA, MO, and mild polyhydraminos . Prenatal ultrasound revealed normal anatomy. Prenatal care was good. Mother received normal medications related to labor and delivery. Membrane rupture: at the time of delivery. The delivery was uncomplicated; infant required CPAP, blow by oxygen and deep suctioning. Apgar scores: 8/8.    Apgar scores:   [place] Assessment:       1 Minute:  Skin color:    Muscle tone:      Heart rate:    Breathing:      Grimace:      Total: 8            5 Minute:  Skin color:    Muscle tone:      Heart rate:    Breathing:      Grimace:      Total: 8            10 Minute:  Skin color:    Muscle tone:      Heart rate:    Breathing:      Grimace:      Total:          Living Status:      .      Review of Systems  Objective:     Admission GA: 39w1d   Admission Weight: 4220 g (9 lb 4.9 oz) (Filed from Delivery Summary)  Admission  Head Circumference: 38.4 cm (Filed from Delivery Summary)   Admission Length: Height:  "48.9 cm (19.25") (Filed from Delivery Summary)    Delivery Method: , Low Transverse       Feeding Method: Breastmilk     Labs:  Recent Results (from the past 168 hour(s))   Hematocrit    Collection Time: 22  7:58 AM   Result Value Ref Range    Hematocrit 46.7 42.0 - 63.0 %   POCT glucose    Collection Time: 22  8:57 AM   Result Value Ref Range    POCT Glucose 41 (LL) 70 - 110 mg/dL   POCT glucose    Collection Time: 22 10:58 AM   Result Value Ref Range    POCT Glucose 59 (L) 70 - 110 mg/dL   POCT glucose    Collection Time: 22  5:05 PM   Result Value Ref Range    POCT Glucose 39 (LL) 70 - 110 mg/dL   POCT glucose    Collection Time: 22  6:34 PM   Result Value Ref Range    POCT Glucose 47 (LL) 70 - 110 mg/dL   POCT glucose    Collection Time: 22  8:30 PM   Result Value Ref Range    POCT Glucose 45 (LL) 70 - 110 mg/dL   POCT glucose    Collection Time: 22 10:49 PM   Result Value Ref Range    POCT Glucose 58 (L) 70 - 110 mg/dL   POCT glucose    Collection Time: 22  5:14 AM   Result Value Ref Range    POCT Glucose 45 (LL) 70 - 110 mg/dL   Bilirubin, , Total    Collection Time: 22  7:59 AM   Result Value Ref Range    Bilirubin, Total -  6.9 (H) 0.1 - 6.0 mg/dL   Bilirubin, direct    Collection Time: 22  7:59 AM   Result Value Ref Range    Bilirubin, Direct 0.4 0.1 - 0.6 mg/dL   POCT glucose    Collection Time: 22  2:10 PM   Result Value Ref Range    POCT Glucose 66 (L) 70 - 110 mg/dL   POCT bilirubinometry    Collection Time: 22  7:37 AM   Result Value Ref Range    Bilirubinometry Index 9.8    POCT bilirubinometry    Collection Time: 22  9:07 AM   Result Value Ref Range    Bilirubinometry Index 12.8        Immunization History   Administered Date(s) Administered    Hepatitis B, Pediatric/Adolescent 2022       Nursery Course: Stable throughout nursery course with no acute events. Feeding well.       [place] Screen " sent greater than 24 hours?: yes  Hearing Screen Right Ear: ABR (auditory brainstem response), passed    Left Ear: ABR (auditory brainstem response), passed   Stooling: Yes  Voiding: Yes  SpO2: Pre-Ductal (Right Hand): 99 %  SpO2: Post-Ductal: 100 %  Car Seat Test?    Therapeutic Interventions: none  Surgical Procedures: none    Discharge Exam:   Discharge Weight: Weight: 3915 g (8 lb 10.1 oz)  Weight Change Since Birth: -7%     Physical Exam  Physical Exam   General Appearance:  Healthy-appearing, vigorous infant, , no dysmorphic features  Head:  Normocephalic, atraumatic, anterior fontanelle open soft and flat  Eyes:  PERRL, red reflex present bilaterally, anicteric sclera, no discharge  Ears:  Well-positioned, well-formed pinnae                             Nose:  nares patent, no rhinorrhea  Throat:  oropharynx clear, non-erythematous, mucous membranes moist, palate intact  Neck:  Supple, symmetrical, no torticollis  Chest:  Lungs clear to auscultation, respirations unlabored   Heart:  Regular rate & rhythm, normal S1/S2, no murmurs, rubs, or gallops   Abdomen:  positive bowel sounds, soft, non-tender, non-distended, no masses, umbilical stump clean  Pulses:  Strong equal femoral and brachial pulses, brisk capillary refill  Hips:  Negative Tiwari & Ortolani, gluteal creases equal  :  Normal Jemal I male genitalia, anus patent, testes descended  Musculosketal: no fausto or dimples, no scoliosis or masses, clavicles intact  Extremities:  Well-perfused, warm and dry, no cyanosis  Skin: no rashes,  jaundice  Neuro:  strong cry, good symmetric tone and strength; positive shravan, root and suck       Allyson Blanca

## 2022-02-03 ENCOUNTER — RESULT REVIEW (OUTPATIENT)
Age: 42
End: 2022-02-03

## 2022-02-09 ENCOUNTER — RESULT REVIEW (OUTPATIENT)
Age: 42
End: 2022-02-09

## 2022-02-09 ENCOUNTER — APPOINTMENT (OUTPATIENT)
Dept: MAMMOGRAPHY | Facility: CLINIC | Age: 42
End: 2022-02-09
Payer: MEDICARE

## 2022-02-09 PROCEDURE — 77066 DX MAMMO INCL CAD BI: CPT

## 2022-02-09 PROCEDURE — G0279: CPT

## 2022-02-10 ENCOUNTER — NON-APPOINTMENT (OUTPATIENT)
Age: 42
End: 2022-02-10

## 2022-02-16 ENCOUNTER — APPOINTMENT (OUTPATIENT)
Dept: ENDOCRINOLOGY | Facility: CLINIC | Age: 42
End: 2022-02-16
Payer: MEDICARE

## 2022-02-16 VITALS
SYSTOLIC BLOOD PRESSURE: 140 MMHG | TEMPERATURE: 97.8 F | OXYGEN SATURATION: 98 % | WEIGHT: 268 LBS | HEIGHT: 62 IN | BODY MASS INDEX: 49.32 KG/M2 | HEART RATE: 90 BPM | DIASTOLIC BLOOD PRESSURE: 90 MMHG

## 2022-02-16 PROCEDURE — 99214 OFFICE O/P EST MOD 30 MIN: CPT

## 2022-02-16 RX ORDER — ANTI-FUNGAL POWDER MICONAZOLE NITRATE TALC FREE 1.42 G/71G
2 POWDER TOPICAL TWICE DAILY
Refills: 0 | Status: ACTIVE | COMMUNITY
Start: 2022-02-16

## 2022-02-16 RX ORDER — ERYTHROMYCIN 20 MG/ML
2 SOLUTION TOPICAL TWICE DAILY
Refills: 0 | Status: ACTIVE | COMMUNITY
Start: 2022-02-16

## 2022-02-16 RX ORDER — PALIPERIDONE 6 MG/1
6 TABLET, EXTENDED RELEASE ORAL
Refills: 0 | Status: DISCONTINUED | COMMUNITY
End: 2022-02-16

## 2022-02-16 RX ORDER — CLONIDINE HYDROCHLORIDE 0.1 MG/1
0.1 TABLET ORAL
Refills: 0 | Status: DISCONTINUED | COMMUNITY
End: 2022-02-16

## 2022-02-16 RX ORDER — AZELASTINE HYDROCHLORIDE 137 UG/1
0.1 SPRAY, METERED NASAL TWICE DAILY
Refills: 0 | Status: ACTIVE | COMMUNITY
Start: 2022-02-16

## 2022-02-16 RX ORDER — BENZTROPINE MESYLATE 1 MG/1
1 TABLET ORAL
Refills: 0 | Status: DISCONTINUED | COMMUNITY
End: 2022-02-16

## 2022-02-16 RX ORDER — CALCIUM CARBONATE/VITAMIN D3 600 MG-10
TABLET ORAL TWICE DAILY
Refills: 0 | Status: ACTIVE | COMMUNITY

## 2022-02-16 RX ORDER — TOPIRAMATE 200 MG/1
200 TABLET ORAL TWICE DAILY
Refills: 0 | Status: ACTIVE | COMMUNITY
Start: 2022-02-16

## 2022-02-16 RX ORDER — CLONAZEPAM 1 MG/1
1 TABLET ORAL TWICE DAILY
Refills: 0 | Status: ACTIVE | COMMUNITY

## 2022-02-16 RX ORDER — QUETIAPINE 400 MG/1
400 TABLET, FILM COATED, EXTENDED RELEASE ORAL DAILY
Refills: 0 | Status: ACTIVE | COMMUNITY
Start: 2022-02-16

## 2022-02-16 RX ORDER — MULTIVIT-MIN/FOLIC/VIT K/LYCOP 400-300MCG
25 MCG TABLET ORAL DAILY
Refills: 0 | Status: ACTIVE | COMMUNITY

## 2022-02-16 RX ORDER — TOPIRAMATE 200 MG/1
200 TABLET ORAL DAILY
Qty: 30 | Refills: 0 | Status: DISCONTINUED | COMMUNITY
Start: 2018-05-03 | End: 2022-02-16

## 2022-02-16 RX ORDER — LITHIUM CARBONATE 300 MG/1
300 TABLET, FILM COATED, EXTENDED RELEASE ORAL
Refills: 0 | Status: ACTIVE | COMMUNITY

## 2022-02-16 RX ORDER — ERYTHROMYCIN 500 MG/1
TABLET, FILM COATED ORAL
Refills: 0 | Status: DISCONTINUED | COMMUNITY
End: 2022-02-16

## 2022-02-16 RX ORDER — NYSTATIN 100MM UNIT
POWDER (EA) MISCELLANEOUS
Refills: 0 | Status: ACTIVE | COMMUNITY
Start: 2022-02-16

## 2022-02-16 RX ORDER — NAPROXEN 375 MG/1
375 TABLET ORAL DAILY
Refills: 0 | Status: ACTIVE | COMMUNITY
Start: 2022-02-16

## 2022-02-16 NOTE — PHYSICAL EXAM
[Alert] : alert [Well Nourished] : well nourished [No Acute Distress] : no acute distress [Normal Sclera/Conjunctiva] : normal sclera/conjunctiva [EOMI] : extra ocular movement intact [PERRL] : pupils equal, round and reactive to light [No Neck Mass] : no neck mass was observed [Thyroid Not Enlarged] : the thyroid was not enlarged [No Respiratory Distress] : no respiratory distress [Clear to Auscultation] : lungs were clear to auscultation bilaterally [Normal S1, S2] : normal S1 and S2 [Normal Rate] : heart rate was normal [Regular Rhythm] : with a regular rhythm [Normal Bowel Sounds] : normal bowel sounds [Not Tender] : non-tender [Soft] : abdomen soft [Normal Gait] : normal gait [No Clubbing, Cyanosis] : no clubbing  or cyanosis of the fingernails [No Joint Swelling] : no joint swelling seen [Normal Strength/Tone] : muscle strength and tone were normal [No Rash] : no rash [No Motor Deficits] : the motor exam was normal [Normal Reflexes] : deep tendon reflexes were 2+ and symmetric [No Tremors] : no tremors [Oriented x3] : oriented to person, place, and time [Normal Affect] : the affect was normal [Normal Insight/Judgement] : insight and judgment were intact [Normal Mood] : the mood was normal [No Edema] : no peripheral edema

## 2022-02-17 NOTE — HISTORY OF PRESENT ILLNESS
[FreeTextEntry1] : Ms. Gu is a 41 year old female with long term history of bipolar disease, hypothyroidism who lives in a group home presented today for evaluation of hyperprolactenemia along with hypothyroidism. Accompanied by aide.\par \par Previous HPI:\par ------------------------------------------------------------------------------------------------------------------------------------------\par As per the health aid, patient's last period was in 10/2017 and recently her Ob/gyn found her to have an elevated prolactin level to 101. A subsequent pituitary MRI was negative for any adenoma. She has been managed on benztropine, seroquel and Invega as her anti-psychotics. Otherwise, denied any galatorrhea, nausea or dizziness. Patient was overall a poor historian. \par \par Levothyroxine dose was previously 137 mcg daily and 1.5 tabs on sunday. Was increased to 150 mcg daily in 09/2021. Was increased to 175 mcg x 7.5 tabs in 12/2021.\par \par \par -------------------------------------------------------------------------\par Current psych medications: \par lithium 900 mg AM/ 600 mg PM, Seroquel 400 mg daily, Klonopin 1 mg BID, Topiramate 200 mg BID\par \par Symptoms: \par No breast discharge or breast tenderness.\par Denies headache, dizziness, nausea, vision changes.\par Reported regular periods at this time - LMP yesterday\par Reports normal bowel movements.\par \par She reported excessive fatigue.\par \par PMH: bipolar disease, mild intellectual disability, severe OA\par \par Currently on LT4 175 mcg Monday-Saturday and 1.5 tabs on Sunday.\par

## 2022-02-17 NOTE — REVIEW OF SYSTEMS
[As Noted in HPI] : as noted in HPI [Decreased Appetite] : appetite not decreased [Recent Weight Gain (___ Lbs)] : no recent weight gain [Recent Weight Loss (___ Lbs)] : no recent weight loss [Visual Field Defect] : no visual field defect [Dry Eyes] : no dryness [Dysphagia] : no dysphagia [Neck Pain] : no neck pain [Dysphonia] : no dysphonia [Nasal Congestion] : no nasal congestion [Chest Pain] : no chest pain [Slow Heart Rate] : heart rate is not slow [Palpitations] : no palpitations [Fast Heart Rate] : heart rate is not fast [Shortness Of Breath] : no shortness of breath [Cough] : no cough [Nausea] : no nausea [Constipation] : no constipation [Vomiting] : no vomiting [Diarrhea] : no diarrhea [Polyuria] : no polyuria [Irregular Menses] : regular menses [Muscle Weakness] : no muscle weakness [Acanthosis] : no acanthosis  [Acne] : no acne [Headaches] : no headaches [Dizziness] : no dizziness [Tremors] : no tremors [Pain/Numbness of Digits] : no pain/numbness of digits [Depression] : no depression [Polydipsia] : no polydipsia [Cold Intolerance] : no cold intolerance [Easy Bleeding] : no ~M tendency for easy bleeding [Easy Bruising] : no tendency for easy bruising

## 2022-02-17 NOTE — ASSESSMENT
[FreeTextEntry1] : 41 year old female with history of bipolar disease here for follow up of hyperprolactinemia. 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 \par \par [phone] ( Nurse Skyla Rockwell) \par \par Seen and d/w Dr. Jarrett.\par Abigail George "Mallorie" Antonio, SONG  Endocrine Fellow\par \par \par   \par

## 2022-02-18 LAB
25(OH)D3 SERPL-MCNC: 25.6 NG/ML
PROLACTIN SERPL-MCNC: 12.8 NG/ML
T4 FREE SERPL-MCNC: 1 NG/DL
TSH SERPL-ACNC: 3.03 UIU/ML

## 2022-02-24 NOTE — ED PROVIDER NOTE - CONSTITUTIONAL MANNER
Shortly after the pts mother entered, pt activated her call light and stated \"Can you take all of this tuff off of me I want to leave. \" Pt was unable to articulate the reason behind wanting to leave simply stating \"I just want to leave. \" Pat Collado was notified of the pts decision and came to the bedside to speak with the pt advising her that not all of her tests had resulted and that leaving now could result in a worsening state or death. Pt stated that she understood the health risks. The pt was then disconnected from the heart monitor and IV removed. Pt signed AMA paperwork and verbalized understanding of its meaning and advised to return to the ER if her symptoms worsened. Pts mother and this RN signed as witnesses. Pt then ambulated from the ED without incident.       Jak Rene RN  02/24/22 3562 INAPPROPRIATE FOR SITUATION/pt verbal, scattered thoughts and discussion, no suicidal ideation

## 2022-03-11 NOTE — REVIEW OF SYSTEMS
[Nl] : Psychiatric [FreeTextEntry1] : bipolar disorder Acitretin Counseling:  I discussed with the patient the risks of acitretin including but not limited to hair loss, dry lips/skin/eyes, liver damage, hyperlipidemia, depression/suicidal ideation, photosensitivity.  Serious rare side effects can include but are not limited to pancreatitis, pseudotumor cerebri, bony changes, clot formation/stroke/heart attack.  Patient understands that alcohol is contraindicated since it can result in liver toxicity and significantly prolong the elimination of the drug by many years.
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
## 2022-05-16 NOTE — ED PROVIDER NOTE - NS_EDPROVIDERDISPOUSERTYPE_ED_A_ED
Chart used as an idea of how to introduce foods to patient. Will work on specific tasks for carry over each session. Today's homework was to have patient work towards consuming preferred liquid from a spoon or cup other than current straw. Used puzzle pieces as reward for patient kissing spoon for count of 5 today with success, mom imitating and engaging in task.   Challenges   Monday Tuesday Wednesday Thursday Friday    Tolerate the food in front of them             Touch food (finger or utensil)             Push food (finger or utensil)             Play with food               Kiss food               Lick food (finger or utensil)             Touch food to teeth              Hold food between teeth              Bite food 1x (can spit out)             Bite piece food off (can spit out)             Hold food in mouth              Chew ________ time              Swallow               Eat independently              *Reinforcer used              Notes:                                 
Attending Attestation (For Attendings USE Only)...

## 2022-05-23 NOTE — ED BEHAVIORAL HEALTH ASSESSMENT NOTE - NS ED BHA MED ROS HEMATOLOGIC LYMPHATIC
[facility]   [facility] Consultation    Referring Provider:  Cat Schmidt MD     Chief Complaint   Patient presents with    Follow-up    Hypertension    Hyperlipidemia    Coronary Artery Disease      Marina A Caridad   1957    History of Present Illness: Kisha Gordon is a 76 y.o. female who is here today for follow up for a history of coronary artery disease- based on CT calcium score- (469), family history of heart diease, hypertension, SOB, and hyperlipidemia. She had a normal stress test on 4/2/2020. An echocardiogram from 4/2/2020 showed an EF of 55%. Today she states she has been feeling well since her last visit. She is tolerating her medications and is taking them as prescribed. She has lost 40 lbs since this past June using weight watchers. Blood pressure is well controlled at home- 100-125 for SBP. She is staying active with daily exercise. Patient currently denies any weight gain, edema, palpitations, chest pain, shortness of breath, dizziness, and syncope. Past Medical History:   has a past medical history of Actinic keratosis, Breast disorder, CAD (coronary artery disease), Chronic kidney disease, Hyperlipidemia, Hypertension, Infertility, female, S/P colonoscopy, Tendinitis of foot, and UTI (urinary tract infection). Surgical History:   has a past surgical history that includes Cholecystectomy (12/1992); laparoscopy; Tonsillectomy and adenoidectomy; Breast surgery; Abdomen surgery; Colonoscopy (8/1/2009); angioplasty; other surgical history (Left, 01/11/2016); epidural steroid injection (N/A, 7/29/2019); Colonoscopy (N/A, 10/23/2019); Bunionectomy (Left, 12/15/2020); Upper gastrointestinal endoscopy (N/A, 4/7/2021); and Breast biopsy. Social History:   reports that she has never smoked. She has never used smokeless tobacco. She reports that she does not drink alcohol and does not use drugs.      Family History:  family history includes Arthritis in her mother; Breast Cancer in her sister; Cancer in her mother; Diabetes in her sister; Heart Disease in her brother, father, and mother; High Blood Pressure in her brother, mother, and sister; High Cholesterol in her brother and mother; Kidney Disease in her sister. brother has heart disease     Home Medications:  Prior to Admission medications    Medication Sig Start Date End Date Taking? Authorizing Provider   nebivolol (BYSTOLIC) 5 MG tablet TAKE ONE TABLET BY MOUTH DAILY 3/1/22  Yes Radha Amador MD   denosumab (PROLIA) 60 MG/ML SOSY SC injection Inject 60 mg into the skin once   Yes Historical Provider, MD   atorvastatin (LIPITOR) 40 MG tablet TAKE ONE TABLET BY MOUTH DAILY 9/3/21  Yes Radha Amador MD   pantoprazole (PROTONIX) 40 MG tablet  8/4/21  Yes Historical Provider, MD   Potassium Citrate ER 15 MEQ (1620 MG) TBCR 2 times daily  1/25/21  Yes Historical Provider, MD   calcitRIOL (ROCALTROL) 0.25 MCG capsule Take 0.25 mcg by mouth every other day   Yes Historical Provider, MD   aspirin 81 MG tablet Take 81 mg by mouth daily   Yes Historical Provider, MD   Melatonin 5 MG CHEW Take by mouth nightly    Yes Historical Provider, MD   predniSONE (DELTASONE) 10 MG tablet Take 1 tablet by mouth twice a day for 7 days, then take 1 tablet by mouth once a day for 7 days, then take Medrol Dosepak  Patient not taking: Reported on 5/23/2022 2/28/22   Rj Gray PA-C   methylPREDNISolone (MEDROL, ABIGAIL,) 4 MG tablet Take by mouth. Patient not taking: Reported on 5/23/2022 2/28/22   Rj Gray PA-C        Allergies:  Codeine, Lisinopril, Norvasc [amlodipine besylate], and Triamterene-hctz     Review of Systems:   · Constitutional: there has been no unanticipated weight loss. There's been no change in energy level, sleep pattern, or activity level. · Eyes: No visual changes or diplopia. No scleral icterus. · ENT: No Headaches, hearing loss or vertigo. No mouth sores or sore throat.   · Cardiovascular: Reviewed in HPI  · Respiratory: No cough or wheezing, no sputum production. No hematemesis. · Gastrointestinal: No abdominal pain, appetite loss, blood in stools. No change in bowel or bladder habits. · Genitourinary: No dysuria, trouble voiding, or hematuria. · Musculoskeletal:  No gait disturbance, weakness or joint complaints. · Integumentary: No rash or pruritis. · Neurological: No headache, diplopia, change in muscle strength, numbness or tingling. No change in gait, balance, coordination, mood, affect, memory, mentation, behavior. · Psychiatric: No anxiety, no depression. · Endocrine: No malaise, fatigue or temperature intolerance. No excessive thirst, fluid intake, or urination. No tremor. · Hematologic/Lymphatic: No abnormal bruising or bleeding, blood clots or swollen lymph nodes. · Allergic/Immunologic: No nasal congestion or hives. Physical Examination:    Vitals:    05/23/22 1357   BP: 122/68   Pulse: 63   SpO2: 98%        Constitutional and General Appearance: NAD   Respiratory:  · Normal excursion and expansion without use of accessory muscles  · Resp Auscultation: Normal breath sounds without dullness  Cardiovascular:  · The apical impulses not displaced  · Heart tones are crisp and normal  · Cervical veins are not engorged  · The carotid upstroke is normal in amplitude and contour without delay or bruit  · Normal S1S2, No S3, No Murmur  · Peripheral pulses are symmetrical and full  · There is no clubbing, cyanosis of the extremities. · No edema  · Femoral Arteries: 2+ and equal  · Pedal Pulses: 2+ and equal   Abdomen:  · No masses or tenderness  · Liver/Spleen: No Abnormalities Noted  Neurological/Psychiatric:  · Alert and oriented in all spheres  · Moves all extremities well  · Exhibits normal gait balance and coordination  · No abnormalities of mood, affect, memory, mentation, or behavior are noted      Stress echocardiogram 9/6/13  Summary  Normal echocardiographic response to stress, but abnormal EKG response. [place] [facility] [place]  No significant renal artery stenosis       CT Calcium score 3/12/2020  Total Agatston calcium score of 469. High likelihood of at least one significant coronary artery stenosis (>50% diameter). Echocardiogram 4/2/2020  Summary   Normal left ventricle systolic function with an estimated ejection fraction   of 55%. No regional wall motion abnormalities are seen. Normal left ventricular diastolic filling pressure. Trivial mitral regurgitation. Systolic pulmonary artery pressure (SPAP) is normal and estimated at 25 mmHg   (right atrial pressure 3 mmHg). Stress test 4/2/2020  Conclusions    Summary  There is normal isotope uptake at stress and rest. There is no evidence of  myocardial ischemia or scar. Hyperdynamic LV systolic function with QP>69%  with uniform wall motion. Low risk study. Assessment:   Coronary artery disease - based on CT calcium score. Stable. Asymptomatic   Hypertension - well controlled. She would like to try reducing meds   Hyperlipidemia - well controlled on lipitor   Family history of heart disease     Plan:  Decrease Bystolic to 2.5 mg for a month, if blood pressure is running less than 135/85 then you can stop it   Cardiac medications reviewed including indications and pertinent side effects. Medication list updated at this visit. Check blood pressure and heart rate at home a few times per week- keep a log with dates and times and bring to office visit   Regular exercise and following a healthy diet encouraged   Follow up with me in 1 year     Shellyibyadi's attestation: This note was scribed in the presence of Dr. Jordyn Corona M.D. By Thony Chavez RN    The scribes documentation has been prepared under my direction and personally reviewed by me in its entirety. I confirm that the note above accurately reflects all work, treatment, procedures, and medical decision making performed by me.     Dr. Jordyn Corona MD      Thank you for allowing me to participate in the care of this individual.      Ruben London.  Aydin Scott M.D., Enrrique De No complaints

## 2022-07-26 NOTE — ED PROVIDER NOTE - MUSCULOSKELETAL [+], MLM
CALF PAIN Olumiant Counseling: I discussed with the patient the risks of Olumiant therapy including but not limited to upper respiratory tract infections, shingles, cold sores, and nausea. Live vaccines should be avoided.  This medication has been linked to serious infections; higher rate of mortality; malignancy and lymphoproliferative disorders; major adverse cardiovascular events; thrombosis; gastrointestinal perforations; neutropenia; lymphopenia; anemia; liver enzyme elevations; and lipid elevations.

## 2022-08-04 RX ORDER — LEVOTHYROXINE SODIUM 0.15 MG/1
150 TABLET ORAL DAILY
Qty: 90 | Refills: 1 | Status: DISCONTINUED | COMMUNITY
Start: 2021-09-21 | End: 2022-08-04

## 2022-08-09 ENCOUNTER — NON-APPOINTMENT (OUTPATIENT)
Age: 42
End: 2022-08-09

## 2022-08-17 ENCOUNTER — EMERGENCY (EMERGENCY)
Facility: HOSPITAL | Age: 42
LOS: 1 days | Discharge: HOME | End: 2022-08-17
Attending: EMERGENCY MEDICINE | Admitting: EMERGENCY MEDICINE

## 2022-08-17 VITALS
TEMPERATURE: 98 F | SYSTOLIC BLOOD PRESSURE: 131 MMHG | RESPIRATION RATE: 18 BRPM | OXYGEN SATURATION: 98 % | DIASTOLIC BLOOD PRESSURE: 67 MMHG | HEIGHT: 62 IN | HEART RATE: 92 BPM

## 2022-08-17 PROCEDURE — 99283 EMERGENCY DEPT VISIT LOW MDM: CPT

## 2022-08-17 RX ORDER — CHLORPROMAZINE HCL 10 MG
100 TABLET ORAL ONCE
Refills: 0 | Status: COMPLETED | OUTPATIENT
Start: 2022-08-17 | End: 2022-08-17

## 2022-08-17 RX ADMIN — Medication 100 MILLIGRAM(S): at 23:38

## 2022-08-17 RX ADMIN — Medication 2 MILLIGRAM(S): at 23:41

## 2022-08-17 NOTE — ED PROVIDER NOTE - NSFOLLOWUPINSTRUCTIONS_ED_ALL_ED_FT
Impulse Control Disorders      Impulse control disorders are a group of mental health disorders in which a person is unable to control his or her sudden desire to do something (impulse). People who are unable to control impulses repeatedly act without planning or thinking about consequences. A person with an impulse control disorder may:  •Have outbursts of anger and argue with authority figures.      •Be physically or verbally aggressive toward others.      •Regularly break rules or laws. This may include harming people, animals, or property.      •Steal, start fires, gatica, or engage in other risky behaviors.      Impulse control disorders typically start in the late teenage to early adult years. People with impulse control disorders often have emotional disorders or other forms of mental illness, such as drug abuse or other addiction problems. Over time, impulse disorders may cause problems in relationships and may result in legal problems.      What are the causes?    The cause of these conditions is not known.      What increases the risk?    The following factors may make you more likely to develop this condition:  •Experiencing abuse or neglect during childhood.      •Experiencing physical or emotional trauma during childhood.      •Having a parent or sibling with an impulse control disorder.      •Having another mental health condition, such as ADHD (attention deficit hyperactivity disorder) or a substance abuse disorder.        What are the signs or symptoms?    Unlike people with other mental health, substance abuse, or general medical conditions, people with impulse control disorders cannot keep from acting on their impulses. They may:  •Have trouble controlling emotions, especially anger.      •Feel like they must act on an impulse when they experience strong emotions or stress.      •Have specific impulsive behaviors that relieve tension, such as setting a fire or stealing.      •Have anxiety, tension, or excitement before or during the impulsive behavior.      •Feel relief or pleasure while acting on the impulse, or after acting on it.      •Act without regard for the safety of themselves or others.      •Act without planning ahead.      •Feel regret or guilt after acting on an impulse.      Symptoms of impulse control disorders differ based on the specific disorder.      How is this diagnosed?     These disorders are diagnosed through an assessment by your health care provider. Your health care provider will ask questions about:  •Your impulsive behavior.      •Your moods.      •Your thoughts.      •Past and recent life events.      •Your medical history.      •Your use of alcohol or drugs, including prescription medicines.      Certain medical conditions, other mental illnesses, and certain substances can cause symptoms similar to impulse control disorders. You may be referred to a mental health specialist.      How is this treated?    Impulse control disorders may be treated with a combination of the following treatments:  •Cognitive behavioral therapy (CBT). This is a form of talk therapy. It focuses on reducing negative beliefs and thoughts related to impulsive behavior and replacing them with healthier thoughts and behaviors. This may be done individually or in a group setting.      •Medicines.      •Family intervention. This is a program that educates family members about your disorder. It teaches healthy communication and problem-solving skills, and it helps family members support you.        Follow these instructions at home:     •Take over-the-counter and prescription medicines only as told by your health care provider. Check with your health care provider before starting any new prescription or over-the-counter medicines.      •Keep all follow-up visits as told by your health care providers or therapists. This is important. This includes going to therapy or family intervention as directed.      •Find a support group to talk with peers about managing stress and impulses.    •Find healthy ways to recognize emotions and manage stress, such as:  •Journaling.      •Exercise.      •Deep breathing, yoga, or meditation.          Contact a health care provider if:    •You are not able to take your medicines as prescribed.      •Your symptoms get worse.        Get help right away if:    •You think about hurting yourself or others.      If you ever feel like you may hurt yourself or others, or have thoughts about taking your own life, get help right away. You can go to your nearest emergency department or call:   • Your local emergency services (911 in the [place]       • A suicide crisis helpline, such as the National Suicide Prevention Lifeline at 1-863.245.3540. This is open 24 hours a day.         Summary    •Impulse control disorders are a group of mental health disorders in which a person is unable to control his or her sudden desire to do something (impulse).      •People with these disorders act impulsively through certain behaviors in order to feel relief from stress or emotional tension.      •Treatment may include cognitive behavioral therapy (CBT), medicines, family intervention, or a combination of these.      This information is not intended to replace advice given to you by your health care provider. Make sure you discuss any questions you have with your health care provider.

## 2022-08-17 NOTE — ED PROVIDER NOTE - CLINICAL SUMMARY MEDICAL DECISION MAKING FREE TEXT BOX
Huddle done with group home mode of transportation via ems   [address] [place] [address] This is a 42 yr old F, pmh MR, impulse control, bipolar, hypothyroid, from [facility] group home after acting out.   care taker Gricel ( [phone])   RN Ghulam ( [phone])   Elvie ( [phone])  Huddle done with group home mode of transportation via ems   [address] [place] [address]  There is no clinical evidence of intoxication, or any acute medical problem requiring immediate intervention.
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
## 2022-08-17 NOTE — ED PROVIDER NOTE - NSICDXPASTMEDICALHX_GEN_ALL_CORE_FT
PAST MEDICAL HISTORY:  Arthritis     Bipolar disorder     Hypothyroid     MR (mental retardation) mild

## 2022-08-17 NOTE — ED PROVIDER NOTE - OBJECTIVE STATEMENT
This is a 42 yr old F, pmh MR, impulse control, bipolar, hypothyroid, from [facility] home after acting out.   care taker Gricel ( [phone])   ALEXANDRA Parmar ( [phone])   Elvie ( [phone])

## 2022-08-17 NOTE — ED PROVIDER NOTE - PATIENT PORTAL LINK FT
You can access the FollowMyHealth Patient Portal offered by [facility] by registering at the following website: [email or web address]. By joining [employer]’s FollowMyHealth portal, you will also be able to view your health information using other applications (apps) compatible with our system.
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
## 2022-08-18 NOTE — ED ADULT NURSE NOTE - NSICDXPASTMEDICALHX_GEN_ALL_CORE_FT
Migraine with status migrainosus, not intractable, unspecified migraine type PAST MEDICAL HISTORY:  Arthritis     Bipolar disorder     Hypothyroid     MR (mental retardation) mild

## 2022-08-29 RX ORDER — LEVOTHYROXINE SODIUM 0.17 MG/1
175 TABLET ORAL
Qty: 94 | Refills: 1 | Status: ACTIVE | COMMUNITY
Start: 2021-12-17 | End: 1900-01-01

## 2022-09-15 ENCOUNTER — APPOINTMENT (OUTPATIENT)
Dept: ENDOCRINOLOGY | Facility: CLINIC | Age: 42
End: 2022-09-15

## 2022-09-19 ENCOUNTER — APPOINTMENT (OUTPATIENT)
Dept: ENDOCRINOLOGY | Facility: CLINIC | Age: 42
End: 2022-09-19

## 2022-09-19 VITALS
SYSTOLIC BLOOD PRESSURE: 130 MMHG | OXYGEN SATURATION: 96 % | HEART RATE: 96 BPM | BODY MASS INDEX: 51.21 KG/M2 | TEMPERATURE: 97.7 F | WEIGHT: 280 LBS | DIASTOLIC BLOOD PRESSURE: 80 MMHG

## 2022-09-19 PROCEDURE — 99214 OFFICE O/P EST MOD 30 MIN: CPT

## 2022-09-19 NOTE — HISTORY OF PRESENT ILLNESS
[FreeTextEntry1] : Ms. Gu is a 41 year old female with long term history of bilpolar disease, hypothyroidism who lives in a group home presented today for evaluation of hyperprolactenemia along with hypothyroidism. \par \par Previous HPI:\par ------------------------------------------------------------------------------------------------------------------------------------------\par As per the health aid, patient's last period was in 10/2017 and recently her Ob/gyn found her to have an elevated prolactin level to 101. A subsequent pituitary MRI was negative for any adenoma. She has been managed on benztropine, seroquel and Invega as her anti-psychotics. Otherwise, denied any galactorrhea, nausea or dizziness. Patient was overall a poor historian. \par \par Levothyroxine dose increased last time to 137 mcg daily and 1.5 tabs on sunday. \par \par \par -------------------------------------------------------------------------\par Change in medication: \par Recent change in lithium 300-600 mg BID, [id number] mg daily, Klonipin 1 mg daily, Topiramate 200 mg daily \par \par Symptoms: \par No breast discharge \par Reported regular periods at this time \par Reported that she has been constipated\par \par \par Meds: \par -Calcium 600 mg \par -Clonazepam 1 mg twice daily \par -Levothyroxine 175 mcg Mon-Sat and 1.5 tabs on Sunday \par -Lithium 600 mg twice daily \par -Omega 3 BID \par -Quetiapine 400 mg daily \par -Topiramate 200 mg daily \par

## 2022-09-19 NOTE — ASSESSMENT
[FreeTextEntry1] : 42 year old female with history of bipolar disease here for follow up of hyperprolactinemia. Overall it appears that that given her MRI is negative high likelihood this is anti-psychotic related hyperprolactinemia.\par Will check her prolactin levels, no signs of hyperprolactinemia.\par Will be checking thyroid function\par \par -Check prolactin levels, TFTs\par -Continue Levothyroxine dosing at 175 mcg for now (7.5 tabs per week), will adjust accordingly \par -Will be called back if dosage adjustment is necessary\par \par -Follow up in 6 months. \par \par \par \par   \par

## 2022-09-20 LAB
IGF-1 INTERP: NORMAL
IGF-I BLD-MCNC: 190 NG/ML
PROLACTIN SERPL-MCNC: 17.2 NG/ML
T4 FREE SERPL-MCNC: 0.8 NG/DL
TSH SERPL-ACNC: 5.53 UIU/ML

## 2022-09-22 ENCOUNTER — NON-APPOINTMENT (OUTPATIENT)
Age: 42
End: 2022-09-22

## 2022-09-30 ENCOUNTER — EMERGENCY (EMERGENCY)
Facility: HOSPITAL | Age: 42
LOS: 0 days | Discharge: HOME | End: 2022-09-30
Attending: STUDENT IN AN ORGANIZED HEALTH CARE EDUCATION/TRAINING PROGRAM

## 2022-09-30 VITALS
DIASTOLIC BLOOD PRESSURE: 77 MMHG | SYSTOLIC BLOOD PRESSURE: 122 MMHG | HEIGHT: 62 IN | OXYGEN SATURATION: 99 % | HEART RATE: 84 BPM | RESPIRATION RATE: 20 BRPM | TEMPERATURE: 98 F | WEIGHT: 279.99 LBS

## 2022-09-30 VITALS
SYSTOLIC BLOOD PRESSURE: 124 MMHG | TEMPERATURE: 98 F | RESPIRATION RATE: 17 BRPM | OXYGEN SATURATION: 98 % | DIASTOLIC BLOOD PRESSURE: 73 MMHG | HEART RATE: 81 BPM

## 2022-09-30 DIAGNOSIS — R10.9 UNSPECIFIED ABDOMINAL PAIN: ICD-10-CM

## 2022-09-30 DIAGNOSIS — F31.9 BIPOLAR DISORDER, UNSPECIFIED: ICD-10-CM

## 2022-09-30 DIAGNOSIS — M19.90 UNSPECIFIED OSTEOARTHRITIS, UNSPECIFIED SITE: ICD-10-CM

## 2022-09-30 DIAGNOSIS — Z88.8 ALLERGY STATUS TO OTHER DRUGS, MEDICAMENTS AND BIOLOGICAL SUBSTANCES: ICD-10-CM

## 2022-09-30 DIAGNOSIS — F79 UNSPECIFIED INTELLECTUAL DISABILITIES: ICD-10-CM

## 2022-09-30 DIAGNOSIS — E03.9 HYPOTHYROIDISM, UNSPECIFIED: ICD-10-CM

## 2022-09-30 LAB
ALBUMIN SERPL ELPH-MCNC: 3 G/DL — LOW (ref 3.3–5)
ALP SERPL-CCNC: 84 U/L — SIGNIFICANT CHANGE UP (ref 40–120)
ALT FLD-CCNC: 27 U/L — SIGNIFICANT CHANGE UP (ref 12–78)
ANION GAP SERPL CALC-SCNC: 5 MMOL/L — SIGNIFICANT CHANGE UP (ref 5–17)
APPEARANCE UR: CLEAR — SIGNIFICANT CHANGE UP
AST SERPL-CCNC: 23 U/L — SIGNIFICANT CHANGE UP (ref 15–37)
BASOPHILS # BLD AUTO: 0.07 K/UL — SIGNIFICANT CHANGE UP (ref 0–0.2)
BASOPHILS NFR BLD AUTO: 0.7 % — SIGNIFICANT CHANGE UP (ref 0–2)
BILIRUB SERPL-MCNC: 0.2 MG/DL — SIGNIFICANT CHANGE UP (ref 0.2–1.2)
BILIRUB UR-MCNC: NEGATIVE — SIGNIFICANT CHANGE UP
BUN SERPL-MCNC: 19 MG/DL — SIGNIFICANT CHANGE UP (ref 7–23)
CALCIUM SERPL-MCNC: 8.6 MG/DL — SIGNIFICANT CHANGE UP (ref 8.5–10.1)
CHLORIDE SERPL-SCNC: 113 MMOL/L — HIGH (ref 96–108)
CO2 SERPL-SCNC: 23 MMOL/L — SIGNIFICANT CHANGE UP (ref 22–31)
COLOR SPEC: YELLOW — SIGNIFICANT CHANGE UP
CREAT SERPL-MCNC: 1.5 MG/DL — HIGH (ref 0.5–1.3)
DIFF PNL FLD: NEGATIVE — SIGNIFICANT CHANGE UP
EGFR: 44 ML/MIN/1.73M2 — LOW
EOSINOPHIL # BLD AUTO: 0.23 K/UL — SIGNIFICANT CHANGE UP (ref 0–0.5)
EOSINOPHIL NFR BLD AUTO: 2.2 % — SIGNIFICANT CHANGE UP (ref 0–6)
GLUCOSE SERPL-MCNC: 84 MG/DL — SIGNIFICANT CHANGE UP (ref 70–99)
GLUCOSE UR QL: NEGATIVE MG/DL — SIGNIFICANT CHANGE UP
HCG SERPL-ACNC: <1 MIU/ML — SIGNIFICANT CHANGE UP
HCT VFR BLD CALC: 35.5 % — SIGNIFICANT CHANGE UP (ref 34.5–45)
HGB BLD-MCNC: 10.6 G/DL — LOW (ref 11.5–15.5)
IMM GRANULOCYTES NFR BLD AUTO: 0.8 % — SIGNIFICANT CHANGE UP (ref 0–0.9)
KETONES UR-MCNC: NEGATIVE — SIGNIFICANT CHANGE UP
LEUKOCYTE ESTERASE UR-ACNC: NEGATIVE — SIGNIFICANT CHANGE UP
LIDOCAIN IGE QN: 178 U/L — SIGNIFICANT CHANGE UP (ref 73–393)
LYMPHOCYTES # BLD AUTO: 2.63 K/UL — SIGNIFICANT CHANGE UP (ref 1–3.3)
LYMPHOCYTES # BLD AUTO: 24.8 % — SIGNIFICANT CHANGE UP (ref 13–44)
MCHC RBC-ENTMCNC: 29.9 G/DL — LOW (ref 32–36)
MCHC RBC-ENTMCNC: 30.6 PG — SIGNIFICANT CHANGE UP (ref 27–34)
MCV RBC AUTO: 102.6 FL — HIGH (ref 80–100)
MONOCYTES # BLD AUTO: 0.88 K/UL — SIGNIFICANT CHANGE UP (ref 0–0.9)
MONOCYTES NFR BLD AUTO: 8.3 % — SIGNIFICANT CHANGE UP (ref 2–14)
NEUTROPHILS # BLD AUTO: 6.71 K/UL — SIGNIFICANT CHANGE UP (ref 1.8–7.4)
NEUTROPHILS NFR BLD AUTO: 63.2 % — SIGNIFICANT CHANGE UP (ref 43–77)
NITRITE UR-MCNC: NEGATIVE — SIGNIFICANT CHANGE UP
NRBC # BLD: 0 /100 WBCS — SIGNIFICANT CHANGE UP (ref 0–0)
PH UR: 7 — SIGNIFICANT CHANGE UP (ref 5–8)
PLATELET # BLD AUTO: 303 K/UL — SIGNIFICANT CHANGE UP (ref 150–400)
POTASSIUM SERPL-MCNC: 3.7 MMOL/L — SIGNIFICANT CHANGE UP (ref 3.5–5.3)
POTASSIUM SERPL-SCNC: 3.7 MMOL/L — SIGNIFICANT CHANGE UP (ref 3.5–5.3)
PROT SERPL-MCNC: 7.8 GM/DL — SIGNIFICANT CHANGE UP (ref 6–8.3)
PROT UR-MCNC: NEGATIVE MG/DL — SIGNIFICANT CHANGE UP
RBC # BLD: 3.46 M/UL — LOW (ref 3.8–5.2)
RBC # FLD: 14 % — SIGNIFICANT CHANGE UP (ref 10.3–14.5)
SODIUM SERPL-SCNC: 141 MMOL/L — SIGNIFICANT CHANGE UP (ref 135–145)
SP GR SPEC: 1 — LOW (ref 1.01–1.02)
UROBILINOGEN FLD QL: NEGATIVE MG/DL — SIGNIFICANT CHANGE UP
WBC # BLD: 10.49 K/UL — SIGNIFICANT CHANGE UP (ref 3.8–10.5)
WBC # FLD AUTO: 10.49 K/UL — SIGNIFICANT CHANGE UP (ref 3.8–10.5)

## 2022-09-30 PROCEDURE — 99284 EMERGENCY DEPT VISIT MOD MDM: CPT

## 2022-09-30 PROCEDURE — 74177 CT ABD & PELVIS W/CONTRAST: CPT | Mod: 26,MA

## 2022-09-30 RX ORDER — KETOROLAC TROMETHAMINE 30 MG/ML
15 SYRINGE (ML) INJECTION ONCE
Refills: 0 | Status: DISCONTINUED | OUTPATIENT
Start: 2022-09-30 | End: 2022-09-30

## 2022-09-30 RX ORDER — LIDOCAINE 4 G/100G
10 CREAM TOPICAL ONCE
Refills: 0 | Status: COMPLETED | OUTPATIENT
Start: 2022-09-30 | End: 2022-09-30

## 2022-09-30 RX ORDER — LIDOCAINE 4 G/100G
1 CREAM TOPICAL
Qty: 14 | Refills: 0
Start: 2022-09-30

## 2022-09-30 RX ORDER — LIDOCAINE 4 G/100G
1 CREAM TOPICAL ONCE
Refills: 0 | Status: COMPLETED | OUTPATIENT
Start: 2022-09-30 | End: 2022-09-30

## 2022-09-30 RX ORDER — SODIUM CHLORIDE 9 MG/ML
1000 INJECTION INTRAMUSCULAR; INTRAVENOUS; SUBCUTANEOUS ONCE
Refills: 0 | Status: COMPLETED | OUTPATIENT
Start: 2022-09-30 | End: 2022-09-30

## 2022-09-30 RX ORDER — LIDOCAINE 4 G/100G
1 CREAM TOPICAL ONCE
Refills: 0 | Status: DISCONTINUED | OUTPATIENT
Start: 2022-09-30 | End: 2022-09-30

## 2022-09-30 RX ORDER — FAMOTIDINE 10 MG/ML
1 INJECTION INTRAVENOUS
Qty: 14 | Refills: 0
Start: 2022-09-30

## 2022-09-30 RX ORDER — METHOCARBAMOL 500 MG/1
1 TABLET, FILM COATED ORAL
Qty: 20 | Refills: 0
Start: 2022-09-30

## 2022-09-30 RX ORDER — FAMOTIDINE 10 MG/ML
20 INJECTION INTRAVENOUS ONCE
Refills: 0 | Status: COMPLETED | OUTPATIENT
Start: 2022-09-30 | End: 2022-09-30

## 2022-09-30 RX ADMIN — SODIUM CHLORIDE 1000 MILLILITER(S): 9 INJECTION INTRAMUSCULAR; INTRAVENOUS; SUBCUTANEOUS at 19:56

## 2022-09-30 RX ADMIN — SODIUM CHLORIDE 1000 MILLILITER(S): 9 INJECTION INTRAMUSCULAR; INTRAVENOUS; SUBCUTANEOUS at 22:39

## 2022-09-30 RX ADMIN — LIDOCAINE 10 MILLILITER(S): 4 CREAM TOPICAL at 19:56

## 2022-09-30 RX ADMIN — LIDOCAINE 1 PATCH: 4 CREAM TOPICAL at 22:52

## 2022-09-30 RX ADMIN — FAMOTIDINE 20 MILLIGRAM(S): 10 INJECTION INTRAVENOUS at 19:56

## 2022-09-30 RX ADMIN — Medication 30 MILLILITER(S): at 19:56

## 2022-09-30 RX ADMIN — Medication 15 MILLIGRAM(S): at 22:53

## 2022-09-30 NOTE — ED PROVIDER NOTE - OBJECTIVE STATEMENT
42 y/ oF w/ pmh of arthritis, bipolar, MR p/w L flank painx  2 days. States pain is worse with palpation. Accompanied by aid. Pt states she has not tried any medications for relief. Patient denies headache, vision changes, eye pain, LOC, focal weakness/numbness, neck pain, fever, cough sore throat, chills, chest pain, palpitations, shortness of breath, wheezing, stridor, neck/back pain, other MSK pain,, nausea, vomiting, diarrhea, constipation, blood in stool, urinary cmplaints, rash, trauma. No prior hx of surg to abd

## 2022-09-30 NOTE — ED PROVIDER NOTE - NSFOLLOWUPCLINICS_GEN_ALL_ED_FT
[facility] - Primary Care  Primary Care  [address] [place] [address]  Phone: [phone]  Fax:

## 2022-09-30 NOTE — ED PROVIDER NOTE - PATIENT PORTAL LINK FT
You can access the FollowMyHealth Patient Portal offered by [facility] by registering at the following website: [email or web address]. By joining [employer]’s FollowMyHealth portal, you will also be able to view your health information using other applications (apps) compatible with our system.

## 2022-09-30 NOTE — ED PROVIDER NOTE - PHYSICAL EXAMINATION
General: No acute distress, mentation at baseline,  well nourished, large body habitus  HEENT: NCAT, Neck supple without meningismus, PERRL, no conjunctival injection  Lungs: CTAB, No wheeze or crackles, No retractions, No increased work of breathing  Heart: S1S2 RRR, No M/R/G, Pules equal Bilaterally in upper and lower extremities distally  Abd: soft, TTP along L CVA and mild LUQ, No guarding, No rebound.  No hernias, no palpable masses.  Extrem: FROM in all joints, no gross deformities appreciated, no significant edema noted, No ulcers. Cap refil < 2sec.  Skin: No rash noted, warm dry.  Neuro:  Grossly normal.  No difficulty ambulating. No focal deficits.  Psychiatric: Appropriate mood and affect.

## 2022-09-30 NOTE — ED PROVIDER NOTE - NS ED ROS FT
CONST: no fevers, no chills  EYES: no pain, no vision changes  ENT: no sore throat, no ear pain, no change in hearing  CV: no chest pain, no leg swelling  RESP: no shortness of breath, no cough  ABD: (+) abdominal pain, no nausea, no vomiting, no diarrhea  : no dysuria, no flank pain, no hematuria  MSK: no back pain, no extremity pain  NEURO: no headache or additional neurologic complaints  HEME: no easy bleeding  SKIN:  no rash

## 2022-09-30 NOTE — ED PROVIDER NOTE - CLINICAL SUMMARY MEDICAL DECISION MAKING FREE TEXT BOX
Differential diagnosis includes: kidney stone vs pyelo vs MSK vs gastritis. Abdominal exam without peritoneal signs. No evidence of acute abdomen at this time. Well appearing. Low suspicion for acute hepatobiliary disease (includng acute cholecystitis), acute pancreatitis, PUD (including perforation), acute infectious processes (pneumonia, hepatitis, pyelonephritis), acute appendicitis, vascular catastrophe, bowel obstruction or viscus perforation. Presentation not consistent with other acute, emergent causes of abdominal pain at this time.    Plan: labs, UA, CT AP, pain control, serial reassessment

## 2022-09-30 NOTE — ED ADULT NURSE NOTE - OBJECTIVE STATEMENT
Pt axox4, form group home, presents to  ED complaining of LUQ pain since yesterday. Pt states that pain is 9/10 intermittent/ sharp, constipation. Denies fever, chills, n/v, cp, sob, diff breathing. LMP "a few days ago" pt states she's still on lithium and is having problem with kidneys.

## 2022-09-30 NOTE — ED PROVIDER NOTE - PROGRESS NOTE DETAILS
Pt reevaluated and found to have no new or worsening symptoms.   AAOX3, NAD, VSS. . Explained results of w/u to pt. Patient verbalized understanding of hospital course and outpatient plans, has decisional making capacity.  Will follow-up with Primary care doctor in the next 5-7 days; patient will call for an appointment. Will return to the ED if there is any worsening of symptoms or development of new symptoms.  Patient able to ambulate w/o difficulty, is tolerating PO intake     Back care tips given, CT negative. pt accompanied by staff who will bring instructions over the facility

## 2022-10-18 NOTE — ED PROVIDER NOTE - WEIGHT BEARING
[Chaperone Present] : A chaperone was present in the examining room during all aspects of the physical examination [Appropriately responsive] : appropriately responsive [Alert] : alert [No Acute Distress] : no acute distress [No Lymphadenopathy] : no lymphadenopathy [Regular Rate Rhythm] : regular rate rhythm [No Murmurs] : no murmurs able [Clear to Auscultation B/L] : clear to auscultation bilaterally [Soft] : soft [Non-tender] : non-tender [Non-distended] : non-distended [No HSM] : No HSM [No Lesions] : no lesions [No Mass] : no mass [Oriented x3] : oriented x3 [Examination Of The Breasts] : a normal appearance [No Masses] : no breast masses were palpable [Labia Majora] : normal [Labia Minora] : normal [Discharge] : a  ~M vaginal discharge was present [Scant] : scant [White] : white [Thin] : thin [Normal] : normal [Enlarged ___ wks] : enlarged [unfilled] ~Uweeks [Uterine Adnexae] : normal [Foul Smelling] : not foul smelling [Tenderness] : nontender [Mass ___ cm] : no uterine mass was palpated [FreeTextEntry5] : l/c/p

## 2022-10-21 NOTE — PROGRESS NOTE BEHAVIORAL HEALTH - NS ED BHA MED ROS GENITOURINARY
No complaints
Unable to assess
Yes - the patient is able to be screened

## 2022-11-30 ENCOUNTER — NON-APPOINTMENT (OUTPATIENT)
Age: 42
End: 2022-11-30

## 2022-12-13 ENCOUNTER — RESULT REVIEW (OUTPATIENT)
Age: 42
End: 2022-12-13

## 2022-12-13 ENCOUNTER — APPOINTMENT (OUTPATIENT)
Dept: OBGYN | Facility: HOSPITAL | Age: 42
End: 2022-12-13

## 2022-12-13 ENCOUNTER — OUTPATIENT (OUTPATIENT)
Dept: OUTPATIENT SERVICES | Facility: HOSPITAL | Age: 42
LOS: 1 days | End: 2022-12-13

## 2022-12-13 VITALS
WEIGHT: 281.7 LBS | SYSTOLIC BLOOD PRESSURE: 111 MMHG | TEMPERATURE: 98.4 F | DIASTOLIC BLOOD PRESSURE: 56 MMHG | BODY MASS INDEX: 51.84 KG/M2 | HEART RATE: 97 BPM | HEIGHT: 62 IN

## 2022-12-13 DIAGNOSIS — F31.9 BIPOLAR DISORDER, UNSPECIFIED: ICD-10-CM

## 2022-12-13 DIAGNOSIS — L02.91 CUTANEOUS ABSCESS, UNSPECIFIED: ICD-10-CM

## 2022-12-13 DIAGNOSIS — E66.9 OBESITY, UNSPECIFIED: ICD-10-CM

## 2022-12-13 DIAGNOSIS — Z01.419 ENCOUNTER FOR GYNECOLOGICAL EXAMINATION (GENERAL) (ROUTINE) WITHOUT ABNORMAL FINDINGS: ICD-10-CM

## 2022-12-13 RX ORDER — MUPIROCIN 20 MG/G
2 OINTMENT TOPICAL 3 TIMES DAILY
Qty: 1 | Refills: 3 | Status: ACTIVE | COMMUNITY
Start: 2022-12-13 | End: 1900-01-01

## 2022-12-13 NOTE — PHYSICAL EXAM
[Chaperone Present] : A chaperone was present in the examining room during all aspects of the physical examination [Appropriately responsive] : appropriately responsive [Alert] : alert [No Acute Distress] : no acute distress [No Lymphadenopathy] : no lymphadenopathy [No Murmurs] : no murmurs [Soft] : soft [Non-tender] : non-tender [Non-distended] : non-distended [No HSM] : No HSM [No Mass] : no mass [Oriented x3] : oriented x3 [FreeTextEntry7] : small 2cm abscess on left lower abdomen [Examination Of The Breasts] : a normal appearance [No Masses] : no breast masses were palpable [Labia Majora] : normal [Labia Minora] : normal [Normal] : normal [FreeTextEntry4] : deferred [FreeTextEntry5] : deferred [FreeTextEntry6] : unable to palpate due to morbid obesity

## 2022-12-13 NOTE — HISTORY OF PRESENT ILLNESS
[Patient reported PAP Smear was normal] : Patient reported PAP Smear was normal [Gonorrhea test offered] : Gonorrhea test offered [Chlamydia test offered] : Chlamydia test offered [HPV test offered] : HPV test offered [N] : Patient denies prior pregnancies [TextBox_4] : 41 yo  LMP 11/2022 here for routine annual Gyn exam.  patient is Bipolar and lives in a group home.  Present today accompanied by her aide.  c/o abscess on left lower abdomen i=which is note improving after using a "cream".   Denies any recent sexual activity--reports having sex in the past.  Last pap 7/2017 normal and last mammo 2/2022 normal. [PapSmeardate] : 7/17 [LMPDate] : 11/2022 [Previously active] : previously active [Men] : men [Vaginal] : vaginal [No] : No [Patient would like to be screened for STIs] : Patient would like to be screened for STIs

## 2022-12-13 NOTE — COUNSELING
[Nutrition/ Exercise/ Weight Management] : nutrition, exercise, weight management [Breast Self Exam] : breast self exam [Contraception/ Emergency Contraception/ Safe Sexual Practices] : contraception, emergency contraception, safe sexual practices [STD (testing, results, tx)] : STD (testing, results, tx) [Medication Management] : medication management

## 2022-12-13 NOTE — DISCUSSION/SUMMARY
[FreeTextEntry1] : Annual Gyn exam\par --pap, HPV and GC/Chlam done\par --STD blood work deferred as patient not sexually active\par --mammo referral for Feb 2023\par --diet and exercise\par \par Abdominal abscess\par --Bactroban ointment to abscess TID for 7 days\par --instructions to keep area clean daily\par --must follow up with PCP if not improved after Bactroban\par \par Follow up for annual/prn\par

## 2022-12-14 LAB
C TRACH RRNA SPEC QL NAA+PROBE: SIGNIFICANT CHANGE UP
HPV HIGH+LOW RISK DNA PNL CVX: SIGNIFICANT CHANGE UP
N GONORRHOEA RRNA SPEC QL NAA+PROBE: SIGNIFICANT CHANGE UP
SPECIMEN SOURCE: SIGNIFICANT CHANGE UP

## 2022-12-23 LAB — CYTOLOGY SPEC DOC CYTO: SIGNIFICANT CHANGE UP

## 2023-02-02 ENCOUNTER — EMERGENCY (EMERGENCY)
Facility: HOSPITAL | Age: 43
LOS: 1 days | Discharge: HOME | End: 2023-02-02
Admitting: EMERGENCY MEDICINE
Payer: MEDICARE

## 2023-02-02 VITALS
RESPIRATION RATE: 18 BRPM | OXYGEN SATURATION: 96 % | SYSTOLIC BLOOD PRESSURE: 131 MMHG | HEART RATE: 93 BPM | TEMPERATURE: 97 F | DIASTOLIC BLOOD PRESSURE: 78 MMHG

## 2023-02-02 PROCEDURE — 99284 EMERGENCY DEPT VISIT MOD MDM: CPT

## 2023-02-02 NOTE — ED PROVIDER NOTE - PATIENT PORTAL LINK FT
You can access the FollowMyHealth Patient Portal offered by [facility] by registering at the following website: [email or web address]. By joining [email or web address]’s FollowMyHealth portal, you will also be able to view your health information using other applications (apps) compatible with our system.

## 2023-02-02 NOTE — ED ADULT TRIAGE NOTE - CCCP TRG CHIEF CMPLNT
verbal altercation with staff  " I want to shoot them all but I don't have a gun"/psychiatric evaluation

## 2023-02-02 NOTE — ED PROVIDER NOTE - PROGRESS NOTE DETAILS
ANN Pruett- Spoke with VINNY Reyes to obtain collateral and reassess ANN Reyes spoke with facility who did not even know patient was here. Patient called 911 from her room. Facility states no change in behavior. This is her baseline. "She is always screaming and crying". Medically cleared for DC. ETA 12pm

## 2023-02-02 NOTE — ED PROVIDER NOTE - CLINICAL SUMMARY MEDICAL DECISION MAKING FREE TEXT BOX
43 y/o F with h/o Bipolar, MR, Depression, Anxiety, Hypothyroidism, pre-DM sent in from group home [facility] s/p verbal altercation with staff member followed by threat "to kill them". Patient crying upon exam. Denies headache, CP, SOB, Ab pain, n/v/d, weakness. Denies SI, HI.   Allergies: Risperdal, Haldol, Tegretol, Geodon    at triage  Plan: SW/Psych consult 43 y/o F with h/o Bipolar, MR, Depression, Anxiety, Hypothyroidism, pre-DM sent in from group [facility] s/p verbal altercation with staff member followed by threat "to kill them". Patient crying upon exam. Denies headache, CP, SOB, Ab pain, n/v/d, weakness. Denies SI, HI.   Allergies: Risperdal, Haldol, Tegretol, Geodon    at triage  Plan: SW/Psych consult  Update: VINNY Reyes spoke with facility who did not even know patient was here. Patient called 911 from her room. Facility states no change in behavior. This is her baseline. "She is always screaming and crying". Medically cleared for DC. ETA 12pm

## 2023-02-02 NOTE — ED ADULT TRIAGE NOTE - CHIEF COMPLAINT QUOTE
has not been taking medication for 2 days" I want to kill them" pt is laughing at triage time pt has homicidal ideations denies any SI ,states " I like jelly beans" c/o bilat knee pain

## 2023-02-02 NOTE — ED PROVIDER NOTE - OBJECTIVE STATEMENT
43 y/o F with h/o Bipolar, MR, Depression, Anxiety, Hypothyroidism, pre-DM sent in from group home [facility] s/p verbal altercation with staff member followed by threat "to kill them". Patient crying upon exam. Denies headache, CP, SOB, Ab pain, n/v/d, weakness. Denies SI, HI.   Allergies: Risperdal, Haldol, Tegretol, Geodon    at triage

## 2023-02-02 NOTE — ED BEHAVIORAL HEALTH NOTE - BEHAVIORAL HEALTH NOTE
Writer called [phone] left a voicemail requesting a callback to social work phone.  writer called pt's group home  spoke to [facility] staff and Arun medical coordinator.  They did not call 911 today, they do not know why pt called 911 on herself.  Pt had intellectual disability.  Pt was  yelling in her room today with the door closed.  Pt has a history of yelling, cursing and fabricating stories almost daily.  They do not have any safety concerns.  Pt will be transported by group home staff with staff supervision.  Valeria completed.

## 2023-02-02 NOTE — ED PROVIDER NOTE - CROS ED CONS ALL NEG
Sarecycline Pregnancy And Lactation Text: This medication is Pregnancy Category D and not consider safe during pregnancy. It is also excreted in breast milk. Detail Level: Simple Tazorac Counseling:  Patient advised that medication is irritating and drying.  Patient may need to apply sparingly and wash off after an hour before eventually leaving it on overnight.  The patient verbalized understanding of the proper use and possible adverse effects of tazorac.  All of the patient's questions and concerns were addressed. Birth Control Pills Pregnancy And Lactation Text: This medication should be avoided if pregnant and for the first 30 days post-partum. Benzoyl Peroxide Counseling: Patient counseled that medicine may cause skin irritation and bleach clothing.  In the event of skin irritation, the patient was advised to reduce the amount of the drug applied or use it less frequently.   The patient verbalized understanding of the proper use and possible adverse effects of benzoyl peroxide.  All of the patient's questions and concerns were addressed. Dapsone Counseling: I discussed with the patient the risks of dapsone including but not limited to hemolytic anemia, agranulocytosis, rashes, methemoglobinemia, kidney failure, peripheral neuropathy, headaches, GI upset, and liver toxicity.  Patients who start dapsone require monitoring including baseline LFTs and weekly CBCs for the first month, then every month thereafter.  The patient verbalized understanding of the proper use and possible adverse effects of dapsone.  All of the patient's questions and concerns were addressed. Topical Sulfur Applications Pregnancy And Lactation Text: This medication is Pregnancy Category C and has an unknown safety profile during pregnancy. It is unknown if this topical medication is excreted in breast milk. Azithromycin Pregnancy And Lactation Text: This medication is considered safe during pregnancy and is also secreted in breast milk. High Dose Vitamin A Pregnancy And Lactation Text: High dose vitamin A therapy is contraindicated during pregnancy and breast feeding. Erythromycin Counseling:  I discussed with the patient the risks of erythromycin including but not limited to GI upset, allergic reaction, drug rash, diarrhea, increase in liver enzymes, and yeast infections. Erythromycin Pregnancy And Lactation Text: This medication is Pregnancy Category B and is considered safe during pregnancy. It is also excreted in breast milk. Spironolactone Counseling: Patient advised regarding risks of diarrhea, abdominal pain, hyperkalemia, birth defects (for female patients), liver toxicity and renal toxicity. The patient may need blood work to monitor liver and kidney function and potassium levels while on therapy. The patient verbalized understanding of the proper use and possible adverse effects of spironolactone.  All of the patient's questions and concerns were addressed. Tazorac Pregnancy And Lactation Text: This medication is not safe during pregnancy. It is unknown if this medication is excreted in breast milk. Topical Clindamycin Counseling: Patient counseled that this medication may cause skin irritation or allergic reactions.  In the event of skin irritation, the patient was advised to reduce the amount of the drug applied or use it less frequently.   The patient verbalized understanding of the proper use and possible adverse effects of clindamycin.  All of the patient's questions and concerns were addressed. Minocycline Counseling: Patient advised regarding possible photosensitivity and discoloration of the teeth, skin, lips, tongue and gums.  Patient instructed to avoid sunlight, if possible.  When exposed to sunlight, patients should wear protective clothing, sunglasses, and sunscreen.  The patient was instructed to call the office immediately if the following severe adverse effects occur:  hearing changes, easy bruising/bleeding, severe headache, or vision changes.  The patient verbalized understanding of the proper use and possible adverse effects of minocycline.  All of the patient's questions and concerns were addressed. Benzoyl Peroxide Pregnancy And Lactation Text: This medication is Pregnancy Category C. It is unknown if benzoyl peroxide is excreted in breast milk. Dapsone Pregnancy And Lactation Text: This medication is Pregnancy Category C and is not considered safe during pregnancy or breast feeding. Bactrim Counseling:  I discussed with the patient the risks of sulfa antibiotics including but not limited to GI upset, allergic reaction, drug rash, diarrhea, dizziness, photosensitivity, and yeast infections.  Rarely, more serious reactions can occur including but not limited to aplastic anemia, agranulocytosis, methemoglobinemia, blood dyscrasias, liver or kidney failure, lung infiltrates or desquamative/blistering drug rashes. Use Enhanced Medication Counseling?: No Bactrim Pregnancy And Lactation Text: This medication is Pregnancy Category D and is known to cause fetal risk.  It is also excreted in breast milk. Isotretinoin Counseling: Patient should get monthly blood tests, not donate blood, not drive at night if vision affected, not share medication, and not undergo elective surgery for 6 months after tx completed. Side effects reviewed, pt to contact office should one occur. Spironolactone Pregnancy And Lactation Text: This medication can cause feminization of the male fetus and should be avoided during pregnancy. The active metabolite is also found in breast milk. Tetracycline Counseling: Patient counseled regarding possible photosensitivity and increased risk for sunburn.  Patient instructed to avoid sunlight, if possible.  When exposed to sunlight, patients should wear protective clothing, sunglasses, and sunscreen.  The patient was instructed to call the office immediately if the following severe adverse effects occur:  hearing changes, easy bruising/bleeding, severe headache, or vision changes.  The patient verbalized understanding of the proper use and possible adverse effects of tetracycline.  All of the patient's questions and concerns were addressed. Patient understands to avoid pregnancy while on therapy due to potential birth defects. Doxycycline Counseling:  Patient counseled regarding possible photosensitivity and increased risk for sunburn.  Patient instructed to avoid sunlight, if possible.  When exposed to sunlight, patients should wear protective clothing, sunglasses, and sunscreen.  The patient was instructed to call the office immediately if the following severe adverse effects occur:  hearing changes, easy bruising/bleeding, severe headache, or vision changes.  The patient verbalized understanding of the proper use and possible adverse effects of doxycycline.  All of the patient's questions and concerns were addressed. Topical Clindamycin Pregnancy And Lactation Text: This medication is Pregnancy Category B and is considered safe during pregnancy. It is unknown if it is excreted in breast milk. Topical Retinoid counseling:  Patient advised to apply a pea-sized amount only at bedtime and wait 30 minutes after washing their face before applying.  If too drying, patient may add a non-comedogenic moisturizer. The patient verbalized understanding of the proper use and possible adverse effects of retinoids.  All of the patient's questions and concerns were addressed. Topical Retinoid Pregnancy And Lactation Text: This medication is Pregnancy Category C. It is unknown if this medication is excreted in breast milk. Birth Control Pills Counseling: Birth Control Pill Counseling: I discussed with the patient the potential side effects of OCPs including but not limited to increased risk of stroke, heart attack, thrombophlebitis, deep venous thrombosis, hepatic adenomas, breast changes, GI upset, headaches, and depression.  The patient verbalized understanding of the proper use and possible adverse effects of OCPs. All of the patient's questions and concerns were addressed. Isotretinoin Pregnancy And Lactation Text: This medication is Pregnancy Category X and is considered extremely dangerous during pregnancy. It is unknown if it is excreted in breast milk. Azithromycin Counseling:  I discussed with the patient the risks of azithromycin including but not limited to GI upset, allergic reaction, drug rash, diarrhea, and yeast infections. High Dose Vitamin A Counseling: Side effects reviewed, pt to contact office should one occur. Doxycycline Pregnancy And Lactation Text: This medication is Pregnancy Category D and not consider safe during pregnancy. It is also excreted in breast milk but is considered safe for shorter treatment courses. Sarecycline Counseling: Patient advised regarding possible photosensitivity and discoloration of the teeth, skin, lips, tongue and gums.  Patient instructed to avoid sunlight, if possible.  When exposed to sunlight, patients should wear protective clothing, sunglasses, and sunscreen.  The patient was instructed to call the office immediately if the following severe adverse effects occur:  hearing changes, easy bruising/bleeding, severe headache, or vision changes.  The patient verbalized understanding of the proper use and possible adverse effects of sarecycline.  All of the patient's questions and concerns were addressed. Topical Sulfur Applications Counseling: Topical Sulfur Counseling: Patient counseled that this medication may cause skin irritation or allergic reactions.  In the event of skin irritation, the patient was advised to reduce the amount of the drug applied or use it less frequently.   The patient verbalized understanding of the proper use and possible adverse effects of topical sulfur application.  All of the patient's questions and concerns were addressed. Detail Level: Detailed Patient Specific Counseling (Will Not Stick From Patient To Patient): \\nRecommend Nutrafol supplement; pt reports that she will rtc next week to purchase; discussed that it takes 3-6 months to start seeing improvement; pt purchased Nutrafol Hairbiotic today. negative...

## 2023-02-06 NOTE — ED BEHAVIORAL HEALTH ASSESSMENT NOTE - HYGIENE
Fair Glycopyrrolate Pregnancy And Lactation Text: This medication is Pregnancy Category B and is considered safe during pregnancy. It is unknown if it is excreted breast milk.

## 2023-02-17 ENCOUNTER — RESULT REVIEW (OUTPATIENT)
Age: 43
End: 2023-02-17

## 2023-02-17 ENCOUNTER — APPOINTMENT (OUTPATIENT)
Dept: MAMMOGRAPHY | Facility: CLINIC | Age: 43
End: 2023-02-17
Payer: MEDICARE

## 2023-02-17 PROCEDURE — 77067 SCR MAMMO BI INCL CAD: CPT

## 2023-02-17 PROCEDURE — 77063 BREAST TOMOSYNTHESIS BI: CPT

## 2023-03-20 ENCOUNTER — APPOINTMENT (OUTPATIENT)
Dept: ENDOCRINOLOGY | Facility: CLINIC | Age: 43
End: 2023-03-20

## 2023-04-26 NOTE — ED ADULT NURSE NOTE - NSICDXPASTMEDICALHX_GEN_ALL_CORE_FT
PAST MEDICAL HISTORY:  Arthritis     Bipolar disorder     Hypothyroid     MR (mental retardation) mild     Awake

## 2023-05-04 ENCOUNTER — APPOINTMENT (OUTPATIENT)
Dept: ENDOCRINOLOGY | Facility: CLINIC | Age: 43
End: 2023-05-04
Payer: MEDICARE

## 2023-05-04 VITALS
SYSTOLIC BLOOD PRESSURE: 128 MMHG | HEIGHT: 62 IN | BODY MASS INDEX: 49.5 KG/M2 | OXYGEN SATURATION: 98 % | HEART RATE: 104 BPM | DIASTOLIC BLOOD PRESSURE: 80 MMHG | WEIGHT: 269 LBS

## 2023-05-04 PROCEDURE — 99214 OFFICE O/P EST MOD 30 MIN: CPT

## 2023-05-04 NOTE — PHYSICAL EXAM
[Alert] : alert [Well Nourished] : well nourished [Normal Sclera/Conjunctiva] : normal sclera/conjunctiva [EOMI] : extra ocular movement intact [PERRL] : pupils equal, round and reactive to light [Normal Outer Ear/Nose] : the ears and nose were normal in appearance [Normal Hearing] : hearing was normal [Normal TMs] : both tympanic membranes were normal [No Neck Mass] : no neck mass was observed [Thyroid Not Enlarged] : the thyroid was not enlarged [No Respiratory Distress] : no respiratory distress [Clear to Auscultation] : lungs were clear to auscultation bilaterally [Normal S1, S2] : normal S1 and S2 [Normal Rate] : heart rate was normal [Regular Rhythm] : with a regular rhythm [Normal Bowel Sounds] : normal bowel sounds [Not Tender] : non-tender [Soft] : abdomen soft [Normal Gait] : normal gait [No Clubbing, Cyanosis] : no clubbing  or cyanosis of the fingernails [No Joint Swelling] : no joint swelling seen [No Rash] : no rash [No Skin Lesions] : no skin lesions [No Motor Deficits] : the motor exam was normal [Normal Reflexes] : deep tendon reflexes were 2+ and symmetric [No Tremors] : no tremors [Oriented x3] : oriented to person, place, and time [Normal Affect] : the affect was normal [Normal Insight/Judgement] : insight and judgment were intact [Normal Mood] : the mood was normal

## 2023-05-04 NOTE — ASSESSMENT
[FreeTextEntry1] : 42 year old female with history of bipolar disease here for follow up of hyperprolactinemia. \par No signs of overt hyperprolactinemia at this time. \par \par She was showing erratic behavior during the appointment after discontinuation of lithium. \par Will be checking thyroid function, unable to get proper clinical history \par \par \par Hyperprolactinemia:\par -Check prolactin levels, IGF-1 \par \par Hypothyroidism:\par -Continue Levothyroxine 200 mcg daily \par -Check TFTs \par \par -Follow up in 6 months. \par \par \par \par   \par

## 2023-05-04 NOTE — HISTORY OF PRESENT ILLNESS
[FreeTextEntry1] : Ms. Gu is a 41 year old female with long term history of bilpolar disease, hypothyroidism who lives in a group home presented today for evaluation of hyperprolactenemia along with hypothyroidism. \par \par Previous HPI:\par ------------------------------------------------------------------------------------------------------------------------------------------\par As per the health aid, patient's last period was in 10/2017 and recently her Ob/gyn found her to have an elevated prolactin level to 101. A subsequent pituitary MRI was negative for any adenoma. She has been managed on benztropine, seroquel and Invega as her anti-psychotics. Otherwise, denied any galactorrhea, nausea or dizziness. Patient was overall a poor historian. \par \par Levothyroxine dose increased last time to 137 mcg daily and 1.5 tabs on sunday. \par \par \par -------------------------------------------------------------------------\par Change in medication: \par Recent change in lithium 300-600 mg BID, [id number] mg daily, Klonipin 1 mg daily, Topiramate 200 mg daily \par \par Symptoms: \par No breast discharge \par Reported regular periods at this time \par Reported that she has been constipated\par \par \par Meds: \par -Calcium 600 mg \par -Clonazepam 1 mg twice daily \par -Levothyroxine 175 mcg Mon-Sat and 1.5 tabs on Sunday \par -Lithium 600 mg twice daily \par -Omega 3 BID \par -Quetiapine 400 mg daily \par -Topiramate 200 mg daily \par

## 2023-05-07 LAB
ANION GAP SERPL CALC-SCNC: 12 MMOL/L
BUN SERPL-MCNC: 24 MG/DL
CALCIUM SERPL-MCNC: 8.9 MG/DL
CHLORIDE SERPL-SCNC: 110 MMOL/L
CHOLEST SERPL-MCNC: 189 MG/DL
CO2 SERPL-SCNC: 19 MMOL/L
CREAT SERPL-MCNC: 1.72 MG/DL
EGFR: 37 ML/MIN/1.73M2
ESTIMATED AVERAGE GLUCOSE: 131 MG/DL
GLUCOSE SERPL-MCNC: 112 MG/DL
HBA1C MFR BLD HPLC: 6.2 %
HDLC SERPL-MCNC: 49 MG/DL
IGF-1 INTERP: NORMAL
IGF-I BLD-MCNC: 104 NG/ML
LDLC SERPL CALC-MCNC: 117 MG/DL
NONHDLC SERPL-MCNC: 140 MG/DL
POTASSIUM SERPL-SCNC: 3.9 MMOL/L
PROLACTIN SERPL-MCNC: 18.1 NG/ML
SODIUM SERPL-SCNC: 141 MMOL/L
T4 FREE SERPL-MCNC: 1 NG/DL
TRIGL SERPL-MCNC: 113 MG/DL
TSH SERPL-ACNC: 1.72 UIU/ML

## 2023-05-08 ENCOUNTER — NON-APPOINTMENT (OUTPATIENT)
Age: 43
End: 2023-05-08

## 2023-05-09 ENCOUNTER — EMERGENCY (EMERGENCY)
Facility: HOSPITAL | Age: 43
LOS: 1 days | Discharge: HOME | End: 2023-05-09
Admitting: STUDENT IN AN ORGANIZED HEALTH CARE EDUCATION/TRAINING PROGRAM
Payer: MEDICARE

## 2023-05-09 LAB — PCP SPEC-MCNC: SIGNIFICANT CHANGE UP

## 2023-05-09 PROCEDURE — 93010 ELECTROCARDIOGRAM REPORT: CPT

## 2023-05-09 PROCEDURE — 99285 EMERGENCY DEPT VISIT HI MDM: CPT

## 2023-05-09 RX ORDER — MIDAZOLAM HYDROCHLORIDE 1 MG/ML
2 INJECTION, SOLUTION INTRAMUSCULAR; INTRAVENOUS ONCE
Refills: 0 | Status: DISCONTINUED | OUTPATIENT
Start: 2023-05-09 | End: 2023-05-09

## 2023-05-09 RX ORDER — CHLORPROMAZINE HCL 10 MG
50 TABLET ORAL ONCE
Refills: 0 | Status: COMPLETED | OUTPATIENT
Start: 2023-05-09 | End: 2023-05-09

## 2023-05-09 RX ADMIN — Medication 2 MILLIGRAM(S): at 22:40

## 2023-05-09 RX ADMIN — Medication 50 MILLIGRAM(S): at 22:58

## 2023-05-09 RX ADMIN — MIDAZOLAM HYDROCHLORIDE 2 MILLIGRAM(S): 1 INJECTION, SOLUTION INTRAMUSCULAR; INTRAVENOUS at 22:55
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
## 2023-05-09 NOTE — ED ADULT NURSE NOTE - CAS EDN DISCHARGE ASSESSMENT
breathing is unlabored without accessory muscle use.
pt is clear for dc by provider, calm and cooperative/Alert and oriented to person, place and time
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
## 2023-05-09 NOTE — ED BEHAVIORAL HEALTH ASSESSMENT NOTE - REFERRED BY
Residence PAST SURGICAL HISTORY:  H/O repair of left rotator cuff     H/O: knee surgery     History of esophageal cancer surgery    History of laminectomy     History of umbilical hernia repair

## 2023-05-09 NOTE — ED ADULT NURSE REASSESSMENT NOTE - NS ED NURSE REASSESS COMMENT FT1
Pt medicated as ordered upon arrival with EMS and [employer] for disorganized TP, unable to follow verbal command with manic like behaviors. No injuries noted. Will continue to monitor for safety and therapeutic effect.
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
## 2023-05-09 NOTE — ED BEHAVIORAL HEALTH NOTE - BEHAVIORAL HEALTH NOTE
[facility]  Reference #: [id number]  Patient Demographic Information (PDI)       PDI	First Name	Last Name	Birth Date	Gender	Street Address	[facility]	Zip Code  RADHA Gu	1980	Female	SEE [place] [place]	[facility]	[id number]    Prescription Information  PDI Filter:    PDI	Current Rx	Drug Type	Rx Written	Rx Dispensed	Drug	Quantity	Days Supply	Prescriber Name	Prescriber FRED #	Payment Method	Dispenser  A	Y	B	04/13/2023	04/13/2023	clonazepam 1 mg tablet	60	30	Amaury Burden	[id number]	Insurance	 [employer] [id number]        [facility] [address]/ WE[address] SITE: no pending legal cases

## 2023-05-09 NOTE — ED BEHAVIORAL HEALTH ASSESSMENT NOTE - DESCRIPTION
yelling, crying, but not physically combative; received Ativan 2 mg, Thorazine 50 mg and Versed 2 mg  ICU Vital Signs Last 24 Hrs  T(C): 36.6 (10 May 2023 00:01), Max: 36.6 (10 May 2023 00:01)  T(F): 97.9 (10 May 2023 00:01), Max: 97.9 (10 May 2023 00:01)  HR: 97 (10 May 2023 00:01) (97 - 97)  BP: 145/97 (10 May 2023 00:01) (145/97 - 145/97)  BP(mean): --  ABP: --  ABP(mean): --  RR: 18 (10 May 2023 00:01) (18 - 18)  SpO2: 99% (10 May 2023 00:01) (99% - 99%)    O2 Parameters below as of 10 May 2023 00:01  Patient On (Oxygen Delivery Method): room air hypothyroidism, HTN, and Glaucoma, single, no dependents, disabled, lives at [facility] home for the past 5 years, mother  yelling, crying, but not physically combative; received Ativan 2 mg, Thorazine 50 mg and Versed 2 mg  ICU Vital Signs Last 24 Hrs  T(C): 36.6 (10 May 2023 00:01), Max: 36.6 (10 May 2023 00:01)  T(F): 97.9 (10 May 2023 00:01), Max: 97.9 (10 May 2023 00:01)  HR: 97 (10 May 2023 00:01) (97 - 97)  BP: 145/97 (10 May 2023 00:01) (145/97 - 145/97)  BP(mean): --  ABP: --  ABP(mean): --  RR: 18 (10 May 2023 00:01) (18 - 18)  SpO2: 99% (10 May 2023 00:01) (99% - 99%)    O2 Parameters below as of 10 May 2023 00:01  Patient On (Oxygen Delivery Method): room air    PROGRESS:  AFTER SHE WAS MEDICATED WITH THORAZINE + ATIVAN + VERSED IM, THERE WAS NO RECURRENCE OF AGITATED OR AGGRESSIVE BEHAVIOR. SHE DID WAKE UP AND HAD A BRIEF EPISODE OF RAMBLING - SPOKE ABOUT THE COMING OF ROULA + BEING BRIEFLY LOUD BUT OTHERWISE, SHE WAS EASILY REDIRECTABLE; ABLE TO FOLLOW INSTRUCTION TO QUIET DOWN. THERE IS NO SUICIDAL OR HOMICIDAL THOUGHTS. SHE IS NOT MANIFESTING ANY SIGNS/ SYMPTOMS OF SEVERE DEPRESSION; NOT ACUTELY MANIC OR PSYCHOTIC.

## 2023-05-09 NOTE — ED BEHAVIORAL HEALTH ASSESSMENT NOTE - NSBHROSSYSTEMS_PSY_ALL_CORE
Psychiatric Pt currently denied experiencing any headaches; has no dizziness, no blurring of vision; no sorethroat; no cough, no fever. no chest pains, no SOB, no palpitations, no abdominal pains, no nausea/ vomiting/ diarrhea, no dysuria, no hesitancy, no arthralgia/ no pruritus. denied any muscle/ joint pains/Psychiatric
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
## 2023-05-09 NOTE — ED BEHAVIORAL HEALTH ASSESSMENT NOTE - RISK ASSESSMENT
Low risk for suicide- although patient has reported suicide attempt she denies suicidal ideation, has not substance abuse, has stable housing and compliant with medications. Low risk for suicide- although patient has reported suicide attempt she denies suicidal ideation, has not substance abuse, has stable housing and compliant with medications.      Pt is currently at low acute suicide risk but remains at chronically elevated risk of self-harm.  at this time,  there are no identifiable acute increase in risk(s) that would be mitigated by an involuntary psychiatric admission. The Pt remains appropriate for current level of care. Modifiable risk factors will be addressed in the current out-Pt treatment.

## 2023-05-09 NOTE — ED BEHAVIORAL HEALTH ASSESSMENT NOTE - OTHER
problems related to social environment with other residents of the group home housing staff member Gricel

## 2023-05-09 NOTE — ED BEHAVIORAL HEALTH ASSESSMENT NOTE - NS ED BHA PLAN TR BH CONTACTED FT2
attempted to contact SONG Burden at [phone]: no answer, left voice mail encouraging call back
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
## 2023-05-09 NOTE — ED BEHAVIORAL HEALTH ASSESSMENT NOTE - OTHER PAST PSYCHIATRIC HISTORY (INCLUDE DETAILS REGARDING ONSET, COURSE OF ILLNESS, INPATIENT/OUTPATIENT TREATMENT)
PMHNP Baldemar Ashraf- moderate intellectual disability, Care coordination [facility] - [facility]     CPEP x    inpatient last
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
## 2023-05-09 NOTE — ED BEHAVIORAL HEALTH ASSESSMENT NOTE - NSBHATTESTAPPAMEND_PSY_A_CORE
I have personally seen and examined this patient. I fully participated in the care of this patient. I have made amendments to the documentation where appropriate and otherwise agree with the history, physical exam, and plan as documented by the KAYLEN
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
## 2023-05-09 NOTE — ED PROVIDER NOTE - PATIENT PORTAL LINK FT
You can access the FollowMyHealth Patient Portal offered by [facility] by registering at the following website: [email or web address]. By joining [employer]’s FollowMyHealth portal, you will also be able to view your health information using other applications (apps) compatible with our system.
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
## 2023-05-09 NOTE — ED BEHAVIORAL HEALTH ASSESSMENT NOTE - VIOLENCE RISK FACTORS:
Antisocial behavior/cognition (past or present)/Violent ideation/threat/speech/Affective dysregulation/Impulsivity/History of being victimized/traumatized
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
## 2023-05-09 NOTE — ED BEHAVIORAL HEALTH ASSESSMENT NOTE - NS ED BHA PLAN TR SAFTETY PLAN DISCUSSED2 FT
adviced to call 911 or come to the nearest ED should symptoms worsen; have increasing bouts of agitation/aggressive behavior; having SI/HI.

## 2023-05-10 VITALS
RESPIRATION RATE: 18 BRPM | SYSTOLIC BLOOD PRESSURE: 145 MMHG | DIASTOLIC BLOOD PRESSURE: 97 MMHG | OXYGEN SATURATION: 99 % | TEMPERATURE: 98 F | HEART RATE: 97 BPM

## 2023-05-10 VITALS
DIASTOLIC BLOOD PRESSURE: 60 MMHG | HEART RATE: 100 BPM | OXYGEN SATURATION: 98 % | SYSTOLIC BLOOD PRESSURE: 116 MMHG | TEMPERATURE: 98 F | RESPIRATION RATE: 17 BRPM

## 2023-05-10 DIAGNOSIS — F79 UNSPECIFIED INTELLECTUAL DISABILITIES: ICD-10-CM

## 2023-05-10 DIAGNOSIS — F31.9 BIPOLAR DISORDER, UNSPECIFIED: ICD-10-CM

## 2023-05-10 LAB
ALBUMIN SERPL ELPH-MCNC: 4.1 G/DL — SIGNIFICANT CHANGE UP (ref 3.3–5)
ALP SERPL-CCNC: 71 U/L — SIGNIFICANT CHANGE UP (ref 40–120)
ALT FLD-CCNC: 18 U/L — SIGNIFICANT CHANGE UP (ref 4–33)
ANION GAP SERPL CALC-SCNC: 12 MMOL/L — SIGNIFICANT CHANGE UP (ref 7–14)
APAP SERPL-MCNC: <10 UG/ML — LOW (ref 15–25)
APPEARANCE UR: CLEAR — SIGNIFICANT CHANGE UP
AST SERPL-CCNC: 18 U/L — SIGNIFICANT CHANGE UP (ref 4–32)
BACTERIA # UR AUTO: NEGATIVE — SIGNIFICANT CHANGE UP
BASOPHILS # BLD AUTO: 0.09 K/UL — SIGNIFICANT CHANGE UP (ref 0–0.2)
BASOPHILS NFR BLD AUTO: 0.9 % — SIGNIFICANT CHANGE UP (ref 0–2)
BILIRUB SERPL-MCNC: <0.2 MG/DL — SIGNIFICANT CHANGE UP (ref 0.2–1.2)
BILIRUB UR-MCNC: NEGATIVE — SIGNIFICANT CHANGE UP
BUN SERPL-MCNC: 29 MG/DL — HIGH (ref 7–23)
CALCIUM SERPL-MCNC: 9 MG/DL — SIGNIFICANT CHANGE UP (ref 8.4–10.5)
CHLORIDE SERPL-SCNC: 109 MMOL/L — HIGH (ref 98–107)
CO2 SERPL-SCNC: 19 MMOL/L — LOW (ref 22–31)
COLOR SPEC: COLORLESS — SIGNIFICANT CHANGE UP
CREAT SERPL-MCNC: 1.87 MG/DL — HIGH (ref 0.5–1.3)
DIFF PNL FLD: NEGATIVE — SIGNIFICANT CHANGE UP
EGFR: 34 ML/MIN/1.73M2 — LOW
EOSINOPHIL # BLD AUTO: 0.09 K/UL — SIGNIFICANT CHANGE UP (ref 0–0.5)
EOSINOPHIL NFR BLD AUTO: 0.9 % — SIGNIFICANT CHANGE UP (ref 0–6)
EPI CELLS # UR: 1 /HPF — SIGNIFICANT CHANGE UP (ref 0–5)
ETHANOL SERPL-MCNC: <10 MG/DL — SIGNIFICANT CHANGE UP
GLUCOSE SERPL-MCNC: 111 MG/DL — HIGH (ref 70–99)
GLUCOSE UR QL: NEGATIVE — SIGNIFICANT CHANGE UP
HCT VFR BLD CALC: 36.5 % — SIGNIFICANT CHANGE UP (ref 34.5–45)
HGB BLD-MCNC: 11.2 G/DL — LOW (ref 11.5–15.5)
IANC: 6.39 K/UL — SIGNIFICANT CHANGE UP (ref 1.8–7.4)
IMM GRANULOCYTES NFR BLD AUTO: 0.4 % — SIGNIFICANT CHANGE UP (ref 0–0.9)
KETONES UR-MCNC: NEGATIVE — SIGNIFICANT CHANGE UP
LEUKOCYTE ESTERASE UR-ACNC: NEGATIVE — SIGNIFICANT CHANGE UP
LYMPHOCYTES # BLD AUTO: 2.3 K/UL — SIGNIFICANT CHANGE UP (ref 1–3.3)
LYMPHOCYTES # BLD AUTO: 23.4 % — SIGNIFICANT CHANGE UP (ref 13–44)
MCHC RBC-ENTMCNC: 30 PG — SIGNIFICANT CHANGE UP (ref 27–34)
MCHC RBC-ENTMCNC: 30.7 GM/DL — LOW (ref 32–36)
MCV RBC AUTO: 97.9 FL — SIGNIFICANT CHANGE UP (ref 80–100)
MONOCYTES # BLD AUTO: 0.92 K/UL — HIGH (ref 0–0.9)
MONOCYTES NFR BLD AUTO: 9.4 % — SIGNIFICANT CHANGE UP (ref 2–14)
NEUTROPHILS # BLD AUTO: 6.39 K/UL — SIGNIFICANT CHANGE UP (ref 1.8–7.4)
NEUTROPHILS NFR BLD AUTO: 65 % — SIGNIFICANT CHANGE UP (ref 43–77)
NITRITE UR-MCNC: NEGATIVE — SIGNIFICANT CHANGE UP
NRBC # BLD: 0 /100 WBCS — SIGNIFICANT CHANGE UP (ref 0–0)
NRBC # FLD: 0 K/UL — SIGNIFICANT CHANGE UP (ref 0–0)
PH UR: 6 — SIGNIFICANT CHANGE UP (ref 5–8)
PLATELET # BLD AUTO: 302 K/UL — SIGNIFICANT CHANGE UP (ref 150–400)
POTASSIUM SERPL-MCNC: 3.8 MMOL/L — SIGNIFICANT CHANGE UP (ref 3.5–5.3)
POTASSIUM SERPL-SCNC: 3.8 MMOL/L — SIGNIFICANT CHANGE UP (ref 3.5–5.3)
PROT SERPL-MCNC: 8.1 G/DL — SIGNIFICANT CHANGE UP (ref 6–8.3)
PROT UR-MCNC: NEGATIVE — SIGNIFICANT CHANGE UP
RBC # BLD: 3.73 M/UL — LOW (ref 3.8–5.2)
RBC # FLD: 13.2 % — SIGNIFICANT CHANGE UP (ref 10.3–14.5)
RBC CASTS # UR COMP ASSIST: 1 /HPF — SIGNIFICANT CHANGE UP (ref 0–4)
SALICYLATES SERPL-MCNC: <0.3 MG/DL — LOW (ref 15–30)
SODIUM SERPL-SCNC: 140 MMOL/L — SIGNIFICANT CHANGE UP (ref 135–145)
SP GR SPEC: 1.01 — LOW (ref 1.01–1.05)
TSH SERPL-MCNC: 1.52 UIU/ML — SIGNIFICANT CHANGE UP (ref 0.27–4.2)
UROBILINOGEN FLD QL: SIGNIFICANT CHANGE UP
VALPROATE SERPL-MCNC: 13.8 UG/ML — LOW (ref 50–100)
WBC # BLD: 9.83 K/UL — SIGNIFICANT CHANGE UP (ref 3.8–10.5)
WBC # FLD AUTO: 9.83 K/UL — SIGNIFICANT CHANGE UP (ref 3.8–10.5)
WBC UR QL: 1 /HPF — SIGNIFICANT CHANGE UP (ref 0–5)

## 2023-05-10 RX ORDER — CHLORPROMAZINE HCL 10 MG
50 TABLET ORAL ONCE
Refills: 0 | Status: COMPLETED | OUTPATIENT
Start: 2023-05-10 | End: 2023-05-10

## 2023-05-10 RX ADMIN — Medication 50 MILLIGRAM(S): at 08:45

## 2023-05-10 RX ADMIN — Medication 2 MILLIGRAM(S): at 08:45

## 2023-05-10 NOTE — ED ADULT NURSE REASSESSMENT NOTE - NS ED NURSE REASSESS COMMENT FT1
pt awakened, rambling with loud tone, no physically aggressive. Pt ambulated to bathroom and provided with oral hydration. Pt returned to her room and is able to follow verbal redirection at this time. MD made aware. Will continue to monitor for safety.

## 2023-05-10 NOTE — ED ADULT NURSE REASSESSMENT NOTE - NS ED NURSE REASSESS COMMENT FT1
Pt remains awake sitting up in bed. Pt is agitated, rambling to self and crying hysterically. Pt unable to be verbally deescalated and refusing VS reassessment. Pt medicated with Thorazine 50mg and Ativan 2mg IM as ordered by MD. Will continue to monitor for safety.

## 2023-05-10 NOTE — ED BEHAVIORAL HEALTH NOTE - BEHAVIORAL HEALTH NOTE
Writer was informed pt was medically and psychiatrically cleared for discharge.  Writer called pt's group home [address]  ([facility]) [phone] spoke to Hari Landin [facility] Staff.  She states the coordinator will arrive around 9am and can arrange transportation.  She states they have other appointments today and do not know what time patient can be picked up.

## 2023-05-10 NOTE — ED ADULT NURSE REASSESSMENT NOTE - NS ED NURSE REASSESS COMMENT FT1
Break Covering RN: Pt awake, calm sitting on bed in  room 3. Respirations even and unlabored, no accessory muscle use. NAD noted at this time.

## 2023-05-27 ENCOUNTER — EMERGENCY (EMERGENCY)
Facility: HOSPITAL | Age: 43
LOS: 1 days | Discharge: HOME | End: 2023-05-27
Admitting: EMERGENCY MEDICINE
Payer: MEDICARE

## 2023-05-27 VITALS
HEART RATE: 54 BPM | TEMPERATURE: 98 F | DIASTOLIC BLOOD PRESSURE: 66 MMHG | OXYGEN SATURATION: 97 % | SYSTOLIC BLOOD PRESSURE: 143 MMHG

## 2023-05-27 VITALS
HEART RATE: 90 BPM | OXYGEN SATURATION: 100 % | DIASTOLIC BLOOD PRESSURE: 62 MMHG | TEMPERATURE: 98 F | SYSTOLIC BLOOD PRESSURE: 134 MMHG | RESPIRATION RATE: 18 BRPM

## 2023-05-27 PROCEDURE — 99284 EMERGENCY DEPT VISIT MOD MDM: CPT

## 2023-05-27 RX ORDER — CHLORPROMAZINE HCL 10 MG
100 TABLET ORAL ONCE
Refills: 0 | Status: COMPLETED | OUTPATIENT
Start: 2023-05-27 | End: 2023-05-27

## 2023-05-27 RX ADMIN — Medication 100 MILLIGRAM(S): at 14:25

## 2023-05-27 NOTE — ED BEHAVIORAL HEALTH NOTE - BEHAVIORAL HEALTH NOTE
As per request of provider sw contact ot residence [address]  and was unable to speak with staff. SW will continue to contact pt residence to speak with staff to inform them pt will be returning after medication given.
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
## 2023-05-27 NOTE — ED PROVIDER NOTE - CLINICAL SUMMARY MEDICAL DECISION MAKING FREE TEXT BOX
Medical evaluation performed. There is no clinical evidence of intoxication or any acute medical problem requiring immediate intervention. D/C to group home

## 2023-05-27 NOTE — ED PROVIDER NOTE - HIV OFFER
Previously Declined (within the last year) Hydroxychloroquine Counseling:  I discussed with the patient that a baseline ophthalmologic exam is needed at the start of therapy and every year thereafter while on therapy. A CBC may also be warranted for monitoring.  The side effects of this medication were discussed with the patient, including but not limited to agranulocytosis, aplastic anemia, seizures, rashes, retinopathy, and liver toxicity. Patient instructed to call the office should any adverse effect occur.  The patient verbalized understanding of the proper use and possible adverse effects of Plaquenil.  All the patient's questions and concerns were addressed.
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
## 2023-05-27 NOTE — ED ADULT NURSE NOTE - NSFALLUNIVINTERV_ED_ALL_ED
Bed/Stretcher in lowest position, wheels locked, appropriate side rails in place/Call bell, personal items and telephone in reach/Instruct patient to call for assistance before getting out of bed/chair/stretcher/Non-slip footwear applied when patient is off stretcher/[place] to call system/Physically safe environment - no spills, clutter or unnecessary equipment/Purposeful proactive rounding/Room/bathroom lighting operational, light cord in reach

## 2023-05-27 NOTE — ED BEHAVIORAL HEALTH NOTE - BEHAVIORAL HEALTH NOTE
Airam contacted pt residence and spoke with Gricel who confirmed pt address [id number] [place] [address]. AIRAM informed Gricel that pt received meds and will be returning to the residence via ambulance.

## 2023-05-27 NOTE — ED ADULT NURSE REASSESSMENT NOTE - NS ED NURSE REASSESS COMMENT FT1
Patient is being discharged today. Education provided via teach back method and written materials to patient. Patient verbalize understanding. No complaints/signs/symptoms of pain, distress, or discomfort at this time.

## 2023-06-30 ENCOUNTER — APPOINTMENT (OUTPATIENT)
Dept: BARIATRICS/WEIGHT MGMT | Facility: CLINIC | Age: 43
End: 2023-06-30
Payer: MEDICARE

## 2023-06-30 VITALS — HEIGHT: 62 IN | BODY MASS INDEX: 49.69 KG/M2 | WEIGHT: 270 LBS

## 2023-06-30 DIAGNOSIS — Z30.09 ENCOUNTER FOR OTHER GENERAL COUNSELING AND ADVICE ON CONTRACEPTION: ICD-10-CM

## 2023-06-30 DIAGNOSIS — Z71.2 PERSON CONSULTING FOR EXPLANATION OF EXAMINATION OR TEST FINDINGS: ICD-10-CM

## 2023-06-30 DIAGNOSIS — Z86.39 PERSONAL HISTORY OF OTHER ENDOCRINE, NUTRITIONAL AND METABOLIC DISEASE: ICD-10-CM

## 2023-06-30 PROCEDURE — 99205 OFFICE O/P NEW HI 60 MIN: CPT | Mod: 95

## 2023-06-30 NOTE — ASSESSMENT
[FreeTextEntry1] : 43 y.o F w Class 3 obesity, bipolar mood d/o, preDM, prolactinemia, hypotsh, late night eating, presents for weight management\par \par - patient recently taken off Lithium, per Rayray mentally more unstable, and was not able to answer questions and focus for questions regarding lifestyle.  With little insight and support system, Joelle is a poor candidate for our program.  \par - Medications FDA approved for weight loss for ex. Wegovy, Saxenda are not covered by Medicare and would be about $1,000 per month, out of pocket.  \par - She does not have diabetes currently, which would be a diagnosis for GLP-1 medications.  \par - It would benefit patient to sleep earlier to avoid snacking, but with poor insight and difficulty having conversation this is unlikely to change at this time\par - it would benefit patient to walk more, and avoid late night snacking, but patient shows no motivation to do so\par - Lithium was discontinued due to kidney issues per Rayray, and therefore metformin would not be a good choice at this time.  Already on topiramate, and would not recommend stimulants in this patient with bipolar d/o.  No new medications were started at this visit\par \par f/u 8 weeks - can discuss with

## 2023-06-30 NOTE — HISTORY OF PRESENT ILLNESS
[FreeTextEntry1] : Bariatric surgery history: none\par Obesity co- morbidities: preDM, prolactinemia, hypotsh, arthritis, depression/anxiety per chart\par Comorbidities improved or resolved:\par Anti-obesity medications: topiramate for mood d/o \par Obesity medication side effects: none\par \par PATIENT WAS NOTIFIED THAT ANYTHING WE DISCUSSED IN OUR MEETING TODAY MAY BE REFLECTED IN WRITING IN THE VISIT NOTE WHICH WILL BE AVAILABLE TO OTHER MEDICAL PROVIDERS TO REVIEW AS PART OF ROUTINE PATIENT CARE.  \par \par Ms. LEO LADD is a 43 year year old female who presents for evaluation and treatment of Class 3 obesity. \par Patient is a poor historian, unable to answer questions regarding her medical health and lifestyle.  \par \par Obesity related co-morbidities: preDM, prolactinemia, hypotsh, arthritis - knees, depression/anxiety per chart\par bipolar mood d/o - was taking lithium now stopped, seroquel, and topiramate, depakote\par limitations with walking - knees. patient states she has knee pain, Rayray says she goes to Pain management, that when told her weight is contributing, Leo responded "I like food too much"\par \par Per Rayray medical coordinator, Has Uncle, sometimes visits.\par \par Patient lives - by herself, no roommates in a group home.  She is given a menu for what to eat and sometimes prepares quick food items by herself late at night\par Employment status - disabled, group home - [facility], Rayray is present today - medical coordinator.  has nutritionist in house.   \par Has lived in group home, more than 6-7 years.  She's allowed to buy whatever she wants with her own money. \par \par Weight History: since 2017 per chart.  Unable to obtain from patient or patient coordinator previous to the last year or so. \par Lowest adult weight: 220\par Highest adult weight: 290s\par \par Has gained 10-20 pounds over the past year.\par \par Obesity began - unknown.  Weight gain has occurred with:  Per Rayray - her weight Feb 2023 she was 277, March 280, April 265, and June 260.  She has been on Lithium for a long time, and now she's stopped it.  \par \par Past weight loss attempts include:  unknown, unable to obtain.  These have produced a maximum of 5-10 pounds of weight loss.  \par Anti-obesity medications in the past: topiramate, for mood for a long time\par \par Sleep: unknown hours. "sometimes they keep me up" +snoring.  bedtime - after 12am per Rayray, no sleep study\par \par Has 3 regular meals a day. \par \par All history was obtained by Rayray, medical coordinator\par Diet history:\par wakes up at: unknown\par B: cheerios or corn flakes, and chips, or waffles, eggs.  \par L: 12-1 pm rice and beans, chicken or burger, sometimes french fries\par D: rice, vegetables, any kind of meat, sometimes macaroni and cheese, mashed potatoes and veggies, or salad.  sometimes she'll buy chinese food, with soda.  \par late night - cheese sandwich, soups\par \par snacks: late night, throughout the day\par eating after dinner: yes\par overeating episodes: feels okay after meals.  \par \par Sodas/fast food/processed foods: +fast foods, eating at night\par \par Water intake per day: "a lot"\par coffee 1\par soda - sometimes, "Sun" unsure what drink\par \par Physical activity:\par Patient enjoys: walking, up and down hallways \par Current physical activity: going to PT - bicycle twice a week.  she has a bike in her room, Rayray is not sure how often she uses it

## 2023-08-11 NOTE — PATIENT PROFILE ADULT - HAS THE PATIENT EXPERIENCED ANY OF THE FOLLOWING WITHIN THE WEEK PRIOR TO ADMISSION?
Responded to [employer] message about trying medications for dizziness recommended by [facility]:    Dixon Markham,     I'm so glad you connected with [facility], certainly try those medications as they recommend, and if you have an abnormal heart rhythm (I'd say still a relatively low chance with your history) we'll handle it then and/or try a different medication.      Hope this helps!     Best,  -Dr. Higuera   no

## 2023-08-18 ENCOUNTER — APPOINTMENT (OUTPATIENT)
Dept: BARIATRICS/WEIGHT MGMT | Facility: CLINIC | Age: 43
End: 2023-08-18
Payer: MEDICARE

## 2023-08-18 ENCOUNTER — OUTPATIENT (OUTPATIENT)
Dept: OUTPATIENT SERVICES | Facility: HOSPITAL | Age: 43
LOS: 1 days | End: 2023-08-18

## 2023-08-18 VITALS — BODY MASS INDEX: 47.74 KG/M2 | WEIGHT: 261 LBS

## 2023-08-18 PROCEDURE — 99214 OFFICE O/P EST MOD 30 MIN: CPT | Mod: 95

## 2023-08-18 NOTE — HISTORY OF PRESENT ILLNESS
[FreeTextEntry1] : Bariatric surgery history: none Obesity co- morbidities: preDM, prolactinemia, hypotsh, arthritis, depression/anxiety per chart Comorbidities improved or resolved: none Anti-obesity medications: topiramate for mood d/o  Obesity medication side effects: none  PATIENT WAS NOTIFIED THAT ANYTHING WE DISCUSSED IN OUR MEETING TODAY MAY BE REFLECTED IN WRITING IN THE VISIT NOTE WHICH WILL BE AVAILABLE TO OTHER MEDICAL PROVIDERS TO REVIEW AS PART OF ROUTINE PATIENT CARE.    Ms. LEO ALDD is a 43 year year old female who presents for evaluation and treatment of Class 3 obesity.  Patient is a poor historian, unable to answer questions in detail regarding her medical health and lifestyle.    Obesity related co-morbidities: preDM, prolactinemia, hypotsh, arthritis - knees, depression/anxiety per chart bipolar mood d/o - was taking lithium now stopped, seroquel, and topiramate, depakote limitations with walking - knees. patient states she has knee pain, Rayray says she goes to Pain management, that when told her weight is contributing, Leo responded "I like food too much"  Per Rayray medical coordinator, Has Uncle, sometimes visits.  Patient lives - by herself, no roommates in a group home.  She is given a menu for what to eat and sometimes prepares quick food items by herself late at night - bread, eggs Employment status - disabled, group home - [facility], Rayray is present today - medical coordinator.  has nutritionist in house.    Has lived in group home, more than 6-7 years.  She's allowed to buy whatever she wants with her own money.   Weight History: since 2017 per chart.  Unable to obtain from patient or patient coordinator previous to the last year or so.  Lowest adult weight: 220 Highest adult weight: 290s  Interim: - eating in the middle of the night, bread, or egg and cheese omelettes.  poor historian as far as frequency - drinking a lot of water per pt - stomach feels "I don't know" - she bikes and does exercise together with someone at the group home - with PT - feels pretty good about things overall - eating pineapple, strawberries - 258# last month, and today 261# per Rayray linnea  Has gained 10-20 pounds over the past year.  Obesity began - unknown.  Weight gain has occurred with:  Per Rayray - her weight Feb 2023 she was 277, March 280, April 265, and June 260.  She has been on Lithium for a long time, and now she's stopped it.    Past weight loss attempts include:  unknown, unable to obtain.  These have produced a maximum of 5-10 pounds of weight loss.   Anti-obesity medications in the past: topiramate, for mood for a long time  Sleep: unknown hours. "sometimes they keep me up" +snoring.  bedtime - after 12am per Rayray, no sleep study  Has 3 regular meals a day.   All history was obtained by Rayray, medical coordinator Diet history: wakes up at: unknown B: cheerios or corn flakes, and chips, or waffles, eggs.   L: 12-1 pm rice and beans, chicken or burger, sometimes french fries D: rice, vegetables, any kind of meat, sometimes macaroni and cheese, mashed potatoes and veggies, or salad.  sometimes she'll buy chinese food, with soda.   late night - cheese sandwich, soups  snacks: late night, throughout the day eating after dinner: yes overeating episodes: feels okay after meals.    Sodas/fast food/processed foods: +fast foods, eating at night  Water intake per day: "a lot" coffee 1 soda - sometimes, "Sun" unsure what drink  Physical activity: Patient enjoys: walking, up and down hallways  Current physical activity: going to PT - bicycle twice a week.  she has a bike in her room, Rayray is not sure how often she uses it

## 2023-08-18 NOTE — REASON FOR VISIT
[Home] : at home, [unfilled] , at the time of the visit. [Medical Office: ([facility])___] : at the medical office located in  [Patient] : the patient [Self] : self [Follow-Up] : a follow-up visit

## 2023-08-18 NOTE — ASSESSMENT
[FreeTextEntry1] : 43 y.o F w Class 3 obesity, bipolar mood d/o, preDM, prolactinemia, hypotsh, late night eating, presents for weight management  - mood improved - weight stable, gained 3# - patient recently taken off Lithium, and incr depakote - With little insight and support system, Joelle is a poor candidate for our program.   - Medications FDA approved for weight loss for ex. Wegovy, Saxenda are not covered by Medicare and would be about $1,000 per month, out of pocket.   - She does not have diabetes currently, which would be a diagnosis for GLP-1 medications.   - It would benefit patient to sleep earlier to avoid snacking, but with poor insight and difficulty having conversation this is unlikely to change at this time - it would benefit patient to walk more, and avoid late night snacking, but patient shows no motivation to do so - Lithium was discontinued due to kidney issues per Rayray, and therefore metformin would not be a good choice at this time.  Already on topiramate, and would not recommend stimulants in this patient with bipolar d/o.  No new medications were started at this visit  f/u 8 weeks

## 2023-08-25 NOTE — ED BEHAVIORAL HEALTH ASSESSMENT NOTE - NS ED BHA MSE GENERAL APPEARANCE
INTERVENTIONAL RADIOLOGY PROCEDURE NOTE    Date: 8/25/2023    Procedure: Non-tunneled dialysis catheter placement    Preoperative diagnosis:   1. ESRD (end stage renal disease) (HCC)    2. Elevated CK    3. Hypomagnesemia    4. Hypophosphatemia    5. Altered mental status    6. AMS (altered mental status)    7. Open wound of plantar aspect of right foot    8. Sepsis ([address])    9. Bacteremia    10. PAD (peripheral artery disease) ([address])    11. Diabetic ulcer of right midfoot associated with type 2 diabetes mellitus, with bone involvement without evidence of necrosis ([address])         Postoperative diagnosis: Same. Surgeon: Najma Zapata MD     Assistant: None. No qualified resident was available. Blood loss: 5 mL    Specimens: None    Findings: Placement of a right IJ non-tunneled dialysis catheter. The catheter may be used immediately. Complications: None immediate.     Anesthesia: local No deformities present/Well developed

## 2023-09-28 DIAGNOSIS — I10 ESSENTIAL (PRIMARY) HYPERTENSION: ICD-10-CM
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
## 2023-10-24 NOTE — ED PROVIDER NOTE - PRO INTERPRETER NEED 2
Physical Therapy Visit    Referring Provider: BEHZAD Villalpando  Medical Diagnosis (from order): Chronic pain of left ankle [M25.572, G89.29]      WC-NO AUTH REQ'D    Visit Type: Daily Treatment Note  Visit: 7  Referring Provider: BEHZAD Villalpando  Medical Diagnosis (from order): Diagnosis Information    Diagnosis  M25.572, G89.29 (ICD-10-CM) - Chronic pain of left ankle         SUBJECTIVE                                                                                                               Patient feels that the more proximal pain is better and all symptoms are more distal now. Still gets burning pain up to 3-4/10 in the distal 1/4 or so of the fibula, not really reaching down to or past the malleolus.     Pain / Symptoms  - Pain rating (out of 10): Current: 0       OBJECTIVE                                                                                                                                       Treatment    Dry Needling:  - Consent signed: yes  - Dry needling used to/for: pain relief and reduced myofascial dysfunction/restriction  - Time Out performed at 9:09, with patient verifying procedure and consent prior to performing the procedure.    Dry needling is considered a standard of care and being performed as a non-billable service.    The patient has been made aware of the potential complications associated with dry needling including fainting, bruising, minor bleeding, pneumothorax, including signs and symptoms of a medical emergency and consents to the performance of the procedure for the treatment of myofascial trigger points, tendon, ligament, or nerve related issues.    Type of needles used:  Seirin J-type size 30 mm x 8  Needles inserted:  8  Needles removed:  8  Needle placement:  2 rows of 4 along the distal half of the fibula, one row anterior and one row posterior to the fibula  Duration needles were left in:  About 20 minutes including the e-stim    Immediate response to the  procedure:  No adverse response    Un-attended Electrical Stimulation (38399/)  - Purpose: pain relief  - Location: anterior tib, lateral calf, and the lower needles more distal down the fibula  - Position: hooklying with legs supported on bolster  - Pulse Rate: 1-6 Hz on pre-program setting #1  - Intensity: patient comfort  - Delivered via: needles  - Duration: 15 minutes    Results: no change in symptoms immediately following  Reaction: no adverse reaction to treatment      Skilled input: verbal instruction/cues    Writer verbally educated and received verbal consent for hand placement, positioning of patient, and techniques to be performed today from patient for hand placement and palpation for techniques and therapist position for techniques as described above and how they are pertinent to the patient's plan of care.  Home Exercise Program    * Standing gastroc stretch  * Standing soleus stretch  * Single limb heel raise  * 1/2 kneeling posterior talar self-glide  Resisted side stepping       ASSESSMENT                                                                                                            Responding well to dry needling. Symptoms are localizing more distally now but still present with activity.     PLAN                                                                                                                           Suggestions for next session as indicated: Progress per plan of care.       Therapy procedure time and total treatment time can be found documented on the Time Entry flowsheet     English

## 2023-10-27 ENCOUNTER — APPOINTMENT (OUTPATIENT)
Dept: BARIATRICS/WEIGHT MGMT | Facility: CLINIC | Age: 43
End: 2023-10-27
Payer: MEDICARE

## 2023-10-27 VITALS — BODY MASS INDEX: 47.74 KG/M2 | WEIGHT: 261 LBS

## 2023-10-27 DIAGNOSIS — F31.9 BIPOLAR DISORDER, UNSPECIFIED: ICD-10-CM

## 2023-10-27 PROCEDURE — 99214 OFFICE O/P EST MOD 30 MIN: CPT | Mod: 95

## 2023-11-02 NOTE — ED PROVIDER NOTE - CPE EDP EYES NORM
normal... Complex Repair And O-L Flap Text: The defect edges were debeveled with a #15 scalpel blade.  The primary defect was closed partially with a complex linear closure.  Given the location of the remaining defect, shape of the defect and the proximity to free margins an O-L flap was deemed most appropriate for complete closure of the defect.  Using a sterile surgical marker, an appropriate flap was drawn incorporating the defect and placing the expected incisions within the relaxed skin tension lines where possible.    The area thus outlined was incised deep to adipose tissue with a #15 scalpel blade.  The skin margins were undermined to an appropriate distance in all directions utilizing iris scissors.

## 2023-11-07 NOTE — ED BEHAVIORAL HEALTH ASSESSMENT NOTE - REFERRED BY
[facility] Emergency Department Medical Screening Exam Note    Patient: Mando Helton Age: 12 month old Sex: male   MRN: [id number] : 2023 Encounter Date: 2023     There were no vitals filed for this visit.    Mando Helton is a 12 month old male who presents to the emergency department with since  has had some cold-like symptoms associated with a couple episodes of diarrhea and decreased appetite.  Also may be a low-grade fever.  However this morning he did fall and bumped his forehead at about 7 AM and seemed to be okay did not lose consciousness however this evening had 1 episode of vomiting and they called the pediatrician who was concerned that it was possibly related to the head injury versus a viral process.  Dad notes his appetite today in general has been down.  He is making wet diapers.  States he has been not quite as playful as normal but also has had some cold symptoms.            Preliminary Orders  Orders Placed This Encounter   • COVID/Flu/RSV panel   • Contact & Droplet with N95 Isolation       This patient was screened by me for the purpose of initial evaluation.  I have performed a medical screening examination and placed preliminary orders.    2023  Yina Bains, Yina Sultana CNP  [id number]     Self

## 2023-11-08 ENCOUNTER — APPOINTMENT (OUTPATIENT)
Dept: ENDOCRINOLOGY | Facility: CLINIC | Age: 43
End: 2023-11-08
Payer: MEDICARE

## 2023-11-08 VITALS
HEIGHT: 62 IN | SYSTOLIC BLOOD PRESSURE: 120 MMHG | HEART RATE: 105 BPM | WEIGHT: 261 LBS | BODY MASS INDEX: 48.03 KG/M2 | OXYGEN SATURATION: 97 % | DIASTOLIC BLOOD PRESSURE: 80 MMHG

## 2023-11-08 DIAGNOSIS — E22.1 HYPERPROLACTINEMIA: ICD-10-CM

## 2023-11-08 PROCEDURE — 99214 OFFICE O/P EST MOD 30 MIN: CPT

## 2023-11-09 LAB
FSH SERPL-MCNC: 9.6 IU/L
IGF-1 INTERP: NORMAL
IGF-I BLD-MCNC: 113 NG/ML
LH SERPL-ACNC: 11.3 IU/L
PROLACTIN SERPL-MCNC: 35.6 NG/ML
T4 FREE SERPL-MCNC: 0.8 NG/DL
TSH SERPL-ACNC: 3.97 UIU/ML

## 2023-11-14 NOTE — ED BEHAVIORAL HEALTH ASSESSMENT NOTE - RECENT MEMORY
Progress Note    Patient: Doug Gonzalez MRN: [id number]  SSN: [id number]    YOB: 1972  Age: 45 y.o. Sex: female        11/14/2023      Subjective:     Patient is a in follow-up for her right femoral nail. She does have a history of being diagnosed with lung adenocarcinoma at her last hospitalization. In the middle of this work-up she was having a significant mount of right lower extremity pain and I treated her with cephalomedullary nail fixation of this. This does seem to help significantly with the pain. She is now having a good bit of pain in her left lower extremity. I did obtain x-rays of her left femur while she was at the hospital and the plain film x-rays actually show very minimal bony destruction on plain film x-ray. He is currently scheduled to get another round of chemotherapy it sounds like in a couple of weeks and to see the radiation team tomorrow    Objective: There were no vitals filed for this visit. Physical Exam:     Skin - incision is well healed with no redness or drainage  Motor and sensory function intact in RIGHT LOWER extremity  Pulses palpable in RIGHT LOWER extremity     XRAY FINDINGS:  No new x-rays of her right lower extremity today, I have reviewed her plain film x-rays again of her left femur    Assessment:     Metastatic disease with newly diagnosed lung adenocarcinoma    Plan:     So we have talked little bit about different treatment options. I have tried explained to her that in my mind there are sort of 2 indications for rodding her left femur. The first would be that if she had a lesion that was causing enough cortical destruction that we would need to treat this in order to make sure that she did not suffer any sort of fracture of her left femur. The second would be for pain relief. She seems to understand all of this.   She definitely is having some discomfort in her left lower extremity and this was helped out with her right femoral Impaired

## 2023-12-04 NOTE — ED PROVIDER NOTE - EYES, MLM
Detail Level: Simple Quality 130: Documentation Of Current Medications In The Medical Record: Current Medications Documented Quality 431: Preventive Care And Screening: Unhealthy Alcohol Use - Screening: Patient not identified as an unhealthy alcohol user when screened for unhealthy alcohol use using a systematic screening method Quality 226: Preventive Care And Screening: Tobacco Use: Screening And Cessation Intervention: Patient screened for tobacco use and is an ex/non-smoker Clear bilaterally, pupils equal, round and reactive to light.

## 2023-12-07 NOTE — PROGRESS NOTE ADULT - ASSESSMENT
39 years old female known to have of bipolar disorder, intellectual disability, hypothyroidism, hemorrhoids, presented to ED for placement    #Placement  -social work consult    #Bipolar disorder  -continue with Topamax, lithium, Seroquel, Klonopin  -Psychiatry eval pending for acute agitation    #Hypothyroidism  -continue with levothyroxine        #Misc  -Lovenox  -regular diet PRINCIPAL DISCHARGE DIAGNOSIS  Diagnosis: Bilateral breast cancer  Assessment and Plan of Treatment:       SECONDARY DISCHARGE DIAGNOSES  Diagnosis: HER2-positive carcinoma of breast  Assessment and Plan of Treatment:     Diagnosis: Ductal carcinoma in situ of left breast  Assessment and Plan of Treatment:     Diagnosis: Asthma  Assessment and Plan of Treatment:

## 2023-12-19 ENCOUNTER — RESULT REVIEW (OUTPATIENT)
Age: 43
End: 2023-12-19

## 2023-12-19 ENCOUNTER — OUTPATIENT (OUTPATIENT)
Dept: OUTPATIENT SERVICES | Facility: HOSPITAL | Age: 43
LOS: 1 days | End: 2023-12-19

## 2023-12-19 ENCOUNTER — APPOINTMENT (OUTPATIENT)
Dept: OBGYN | Facility: HOSPITAL | Age: 43
End: 2023-12-19
Payer: MEDICARE

## 2023-12-19 VITALS
HEART RATE: 109 BPM | SYSTOLIC BLOOD PRESSURE: 126 MMHG | DIASTOLIC BLOOD PRESSURE: 96 MMHG | BODY MASS INDEX: 47.11 KG/M2 | TEMPERATURE: 98 F | WEIGHT: 256 LBS | HEIGHT: 62 IN

## 2023-12-19 DIAGNOSIS — Z01.419 ENCOUNTER FOR GYNECOLOGICAL EXAMINATION (GENERAL) (ROUTINE) W/OUT ABNORMAL FINDINGS: ICD-10-CM

## 2023-12-19 PROCEDURE — G0101: CPT

## 2023-12-19 NOTE — PHYSICAL EXAM
[Alert] : alert [No Acute Distress] : no acute distress [Soft] : soft [Non-tender] : non-tender [Examination Of The Breasts] : a normal appearance [No Masses] : no breast masses were palpable [Labia Majora] : normal [Labia Minora] : normal [Normal] : normal [Uterine Adnexae] : normal

## 2023-12-19 NOTE — HISTORY OF PRESENT ILLNESS
[N] : Patient denies prior pregnancies [TextBox_4] : 42 yo  LMP 2022 here for routine annual Gyn exam.  Patient is bipolar and lives in a group home.  Accompanied today by formal health aide from the home.  No problems voiced.  DOes not have periods due to hyperprolactinemia.  Sees endocrine for this and hypothyroidism.  Taking levothyroxine, calcium, clonazepam, quetiapine, topiramate, lithium, seroquel and klonopin.   patient is not sexually active and had normal pap done last year. [Mammogramdate] : 02/23 [PapSmeardate] : 12/22

## 2023-12-19 NOTE — DISCUSSION/SUMMARY
[FreeTextEntry1] : Annual Gyn exam --normal pap done in 2022--due again in 2027 --STD testing not indicated as patient is not sexually active --Blood work deferred to PCP and endocrine --mammo referral given Follow up again 1 year Instructions given to health aide present

## 2023-12-20 DIAGNOSIS — Z01.419 ENCOUNTER FOR GYNECOLOGICAL EXAMINATION (GENERAL) (ROUTINE) WITHOUT ABNORMAL FINDINGS: ICD-10-CM
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
## 2024-02-20 ENCOUNTER — APPOINTMENT (OUTPATIENT)
Dept: MAMMOGRAPHY | Facility: CLINIC | Age: 44
End: 2024-02-20
Payer: MEDICARE

## 2024-02-20 ENCOUNTER — RESULT REVIEW (OUTPATIENT)
Age: 44
End: 2024-02-20

## 2024-02-20 PROCEDURE — 77067 SCR MAMMO BI INCL CAD: CPT

## 2024-02-20 PROCEDURE — 77063 BREAST TOMOSYNTHESIS BI: CPT

## 2024-02-21 ENCOUNTER — NON-APPOINTMENT (OUTPATIENT)
Age: 44
End: 2024-02-21

## 2024-02-22 ENCOUNTER — RESULT REVIEW (OUTPATIENT)
Age: 44
End: 2024-02-22

## 2024-03-05 ENCOUNTER — NON-APPOINTMENT (OUTPATIENT)
Age: 44
End: 2024-03-05

## 2024-03-05 NOTE — PATIENT PROFILE ADULT - SPECIFY REASON UNABLE TO ASSESS:
I spoke to pt and wife, Sully. They state that their son has connections at [facility], so they were able to book appt w/ Dr Natalie Hall at [facility] for tomorrow 3/6/24. Onc referral to [facility] no longer needed; referral closed.    Ok Bower RN  Oncology Nurse Navigator  [facility] [place]  [phone]        Pt. unable to verbalize d/t hx and dx

## 2024-03-13 ENCOUNTER — EMERGENCY (EMERGENCY)
Facility: HOSPITAL | Age: 44
LOS: 1 days | Discharge: HOME | End: 2024-03-13
Attending: EMERGENCY MEDICINE | Admitting: EMERGENCY MEDICINE
Payer: MEDICARE

## 2024-03-13 VITALS
HEART RATE: 95 BPM | TEMPERATURE: 98 F | RESPIRATION RATE: 16 BRPM | DIASTOLIC BLOOD PRESSURE: 57 MMHG | SYSTOLIC BLOOD PRESSURE: 111 MMHG | OXYGEN SATURATION: 98 %

## 2024-03-13 PROCEDURE — 99284 EMERGENCY DEPT VISIT MOD MDM: CPT
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
## 2024-03-14 VITALS
HEART RATE: 83 BPM | OXYGEN SATURATION: 100 % | RESPIRATION RATE: 18 BRPM | DIASTOLIC BLOOD PRESSURE: 73 MMHG | SYSTOLIC BLOOD PRESSURE: 117 MMHG | TEMPERATURE: 98 F

## 2024-03-14 PROCEDURE — 71046 X-RAY EXAM CHEST 2 VIEWS: CPT | Mod: 26

## 2024-03-14 PROCEDURE — 73590 X-RAY EXAM OF LOWER LEG: CPT | Mod: 26,LT

## 2024-03-14 RX ADMIN — Medication 2 MILLIGRAM(S): at 10:00

## 2024-03-14 NOTE — ED PROVIDER NOTE - PSYCHIATRIC, MLM
Alert and oriented to person, place, time/situation. tearful; rapid speech; no apparent risk to self or others.

## 2024-03-14 NOTE — ED BEHAVIORAL HEALTH NOTE - BEHAVIORAL HEALTH NOTE
VINNY spoke with [facility] RN Skyla at [phone] re: pt return.  Skyla conferenced in  Shahana re; pt's return.  Per Skyla and Shahana, pt can return to the residence.  Pt to travel via EMS back home.  Address is [address]  residence # [phone]; [facility] # [phone].  Discussed with MD and RN; Rn to arrange ambulance for pt travel back home.  MD aware and in agreement with the plan.  verbal huddle complete.

## 2024-03-14 NOTE — ED PROVIDER NOTE - PATIENT PORTAL LINK FT
You can access the FollowMyHealth Patient Portal offered by [facility] by registering at the following website: [email or web address]. By joining [facility]’s FollowMyHealth portal, you will also be able to view your health information using other applications (apps) compatible with our system.
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
## 2024-03-14 NOTE — ED ADULT NURSE REASSESSMENT NOTE - NS ED NURSE REASSESS COMMENT FT1
PT is resting in stretcher, easily arousable to verbal stimuli. no apparent distress noted at this time. hand off will be given to primary nurse when return to area.
Per charge ALEXANDRA Mathis, Ramona Rodriguez contacted to see pt. MD tan aware of BH patient  per Charge ALEXANDRA Araiza.
RN Rounding Note 4:30am- Pt observed sleeping, respirations even and non labored. Pt awaiting reassessment in AM.
Received report from PM RN. Patient is alert and oriented. Offered and accepted breakfast. Spoke with Group Home at [phone] to let them know EMS has been called and patient will be on her way once they arrive. Will continue to monitor until pickup.    ALEXANDRA Parmar
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
## 2024-03-14 NOTE — ED PROVIDER NOTE - TEMPLATE, MLM
10/25/2017      RE: Kathryn Cummings  [address] [facility] [address]     Dear Colleague,    Thank you for referring your patient, Kathryn Cummings, to the [facility]. Please see a copy of my visit note below.    Rheumatology Clinic Visit     Kathryn Cummings MRN# [id number]   YOB: 1977 Age: 40 year old     Date of Visit: October 25, 2017  Primary care provider: Rebecca Guaman          Assessment and Plan:   Assessment     -- Fibromyalgia   -- Arthralgia   -- Chronic tramadol use   -- history of high SSB - 2007    Ms Cummings is 46-year-old female seen in the clinic for evaluation of joint pains. She has more than 15 years history of chronic joint pains and musculoskeletal pain. Repeated autoimmune workup, x-rays and MRIs over the years have been unremarkable, no significant evidence of connective tissue disease been found on repeat testing. In 2007 SSB antibody was positive but repeated testing later has been negative. She has seen rheumatologist Dr. Haley Thomas in 2011 and was given diagnosis of fibromyalgia after failing multiple treatments including methotrexate, Plaquenil, and Neurontin and Cymbalta. Her pain has been managed since then with tramadol t.i.d.    On physical exam today she has widespread tenderness not only on her joints but diffusely over her muscles and tendons. Muscle strength is normal. Her physical exam is consistent with fibromyalgia but due to widespread joint involvement I will do rheumatoid serologies, JORGE, inflammatory markers along with vitamin D and TSH.     If her autoimmune workup comes back normal connective tissue disease like lupus or Sjogren's is unlikely. She can follow-up with fibromyalgia program in [facility].     Plan     -- Blood tests -- JORGE, rheumatoid factor, anti-CCP, ESR, CRP, vitamin D, TSH.     -- Will get x-rays of bilateral hands and feet today to look for evidence of inflammatory arthritis.    -- [facility]  Fibromyalgia program referral given.    -- RTC on as needed basis.                Active Problem List:     Patient Active Problem List    Diagnosis Date Noted     Chronic pain syndrome 12/03/2015     Priority: Medium     Patient is followed by REBECCA GUAMAN for ongoing prescription of pain medication.  All refills should be approved by this provider, or covering partner.    Medication(s): Tramadol 50mg  Maximum quantity per month: 180 (pt may use less than this per month, but should not need more than this per month)  Clinic visit frequency required: Q 6  months     Controlled substance agreement on file: Yes       Date(s): 12/3/2015      Pain Clinic evaluation in the past: No    DIRE Total Score(s):  No flowsheet data found.    Last [facility] website verification:    [email or web address]         Esophageal reflux, GERD 11/20/2013     Priority: Medium     SLE (systemic lupus erythematosus) (H) 03/06/2013     Priority: Medium     Saw Rhuem from [facility] 7/2012- question this diagnosis with further testing.        Fibromyalgia 03/06/2013     Priority: Medium     CARDIOVASCULAR SCREENING; LDL GOAL LESS THAN 160 03/06/2013     Priority: Medium            History of Present Illness:   Kathryn Cummings is a 40 year old female with PMH of fibromyalgia, GERD seen in the clinic in consultation at request of Rebecca Guaman, RADHA for  evaluation of joint pains.     She has more than 15 years history of chronic pain involving her joints, muscles and soft tissues. She has seen Dr. Haley Thomas of rheumatology in 2011 and has had extensive autoimmune workup including blood tests, x-rays, MRI and bone scan which has been unremarkable. In the 2007 SSB antibody was found to be elevated. Subsequent testing over the years has been negative. She had MRI of SI joint which revealed no evidence of sacroiliitis, bone scan revealed no inflammatory arthritis. She was tried on methotrexate and Plaquenil for possible inflammatory  arthritis which did not help. Since 2011 her pain has been managed with tramadol 50 mg t.i.d.     Lately she feels  her pain has become more intense mostly in the neck. Reports that her body feels like lead. Hips, hands, knees freeze. Dont sleep very well. Constantly having to move during sleep. When she gets up in morning, all over joints hurt. Last week she was given high-dose prednisone taper by her PCP starting from 60 mg which did not help.     In the last 2 weeks she had 2 episodes of sudden onset dizziness, lightheadedness, burning sensation which lasted for about 35-40 minutes and improved without any intervention. She did not go to the ED after those episodes. Denies any seizures, abdominal pain, nausea, chest pain or shortness of breath.     No history of psoriasis, ulcerative colitis or chron's disease. No h/o iritis, enthesitis, finger or toe swelling like dactylitis, plantar fascitis or heel pain. Denies any raynauds, malar rash, recurrent mouth/genital ulcers, pleuritic chest pains, recurrent sinusitis/rhinitis, swallowing difficulty, hearing or visual changes recently. Face burn and turn red. + Photosensitivity. + mild sicca symptoms. No h/o arterial/venous thrombosis in the past. Denies any tick bite, recent GI/ infection.      Denies any fever, chills, night sweats, weight loss.         Review of Systems:   Review Of Systems  Constitutional: denies fever, chills, night sweats and weight loss.  Skin: No skin rash.  Eyes: + dryness or irritation in eyes. No episode of eye inflammation or redness.   Ears/Nose/Throat: no recurrent sinus infections.  Respiratory: No shortness of breath, dyspnea on exertion, cough, or hemoptysis  Cardiovascular: no chest pain or palpitations.  Gastrointestinal: no nausea, vomiting, abdominal pain.  Normal bowel movements.  Genitourinary: no dysuria, frequency  or hematuria.  Musculoskeletal: as in HPI  Neurologic: no numbness, tingling.  Psychiatric: no mood  disorders.  Hematologic/Lymphatic/Immunologic: no history of easy bruising, petechia or purpura.  No abnormal bleeding.   Endocrine: no h/o thyroid disease or Diabetes.                  Past Medical History:     Past Medical History:   Diagnosis Date     DDD (degenerative disc disease)      Fibromyalgia 2004     SLE (systemic lupus erythematosus) (H) 2002     Past Surgical History:   Procedure Laterality Date     ABDOMEN SURGERY  2007    laproscopy     APPENDECTOMY       BACK SURGERY      Henriated disc-lumbar, titatnium rods     GYN SURGERY      c section     GYN SURGERY      tubal            Social History:     Social History     Occupational History     Not on file.     Social History Main Topics     Smoking status: Never Smoker     Smokeless tobacco: Never Used     Alcohol use Yes      Comment: social/occassional     Drug use: No     Sexual activity: Yes     Partners: Male     Birth control/ protection: Surgical            Family History:   No family history on file.         Allergies:     Allergies   Allergen Reactions     Amoxicillin      hives     Codeine Hydrobromide      Rash, mood change            Medications:     Current Outpatient Prescriptions   Medication Sig Dispense Refill     omeprazole (PRILOSEC) 40 MG capsule Take 1 capsule (40 mg) by mouth daily Take 30-60 minutes before a meal. 90 capsule 1     ondansetron (ZOFRAN ODT) 4 MG ODT tab Take 1-2 tablets (4-8 mg) by mouth every 8 hours as needed for nausea 45 tablet 1     traMADol (ULTRAM) 50 MG tablet TAKE 2 TABS BY MOUTH EVERY 6 HRS AS NEEDED FOR PAIN 180 tablet 5     IBUPROFEN PO Take 800-1,000 mg by mouth as needed.       predniSONE (DELTASONE) 20 MG tablet Take 3 tabs (60 mg) by mouth daily x 3 days, 2 tabs (40 mg) daily x 3 days, 1 tab (20 mg) daily x 3 days, then 1/2 tab (10 mg) x 3 days. (Patient not taking: Reported on 10/25/2017) 20 tablet 0     Cholecalciferol (VITAMIN D) 1000 UNITS capsule Take 5 capsules by mouth daily.               "Physical Exam:   Blood pressure 136/81, pulse 84, height 1.683 m (5' 6.25\"), weight 72.9 kg (160 lb 11.2 oz), SpO2 98 %, not currently breastfeeding.  Wt Readings from Last 4 Encounters:   10/25/17 72.9 kg (160 lb 11.2 oz)   09/13/17 73 kg (161 lb)   02/16/17 71.2 kg (157 lb)   07/22/16 73.5 kg (162 lb)       Constitutional: well-developed, appearing stated age; cooperative  Eyes: nl EOM, PERRLA, vision, conjunctiva, sclera  ENT: nl external ears, nose, hearing, lips, teeth, gums, throat  No mucous membrane lesions, normal saliva pool  Neck: no mass or thyroid enlargement  Resp: lungs clear to auscultation, nl to palpation  CV: RRR, no murmurs, rubs or gallops, no edema  GI: no ABD mass or tenderness, no HSM  : not tested  Lymph: no cervical, supraclavicular, inguinal or epitrochlear nodes    MS: All TMJ, neck, shoulder, elbow, wrist, MCP/PIP/DIP, spine, hip, knee, ankle, and foot MTP/IP joints were examined.   -- Widespread tenderness present over all the joints examined. No visible synovitis present.  -- She has multiple soft tissue tender points over her upper back, inter scapular areas, upper arms, thighs and calves  -- No SI joint tenderness present.  -- No active synovitis or deformity. Full ROM.  Normal  strength.   -- No dactylitis,  tenosynovitis, enthesopathy.    Skin: no nail pitting, alopecia, rash, nodules or lesions  Neuro: nl cranial nerves, strength, sensation, DTRs.   Psych: nl judgement, orientation, memory, affect.         Data:     Results for orders placed or performed in visit on 02/16/17   Drug Screen Comprehensive , Urine with Reported Meds (Pain Care Package)   Result Value Ref Range    Pain Drug SCR UR W RPTD Meds       FINAL  (Note)  ====================================================================  TOXAS[facility]RE COMP DRUG ANALYSIS,UR  ====================================================================  Test                             Result       Flag       Units  Drug Present and " Declared for Prescription Verification   Tramadol                       PRESENT      EXPECTED   O-Desmethyltramadol            PRESENT      EXPECTED   N-Desmethyltramadol            PRESENT      EXPECTED    Source of tramadol is a prescription medication.    O-desmethyltramadol and N-desmethyltramadol are expected    metabolites of tramadol.    ====================================================================  Test                      Result    Flag   Units      Ref Range   Creatinine              163              mg/dL      >=20  ====================================================================  Declared Medications:  The flagging and interpretation on this report are based on the  following declared medications.   Unexpected results may arise from  inaccuracies in the declared medications.    **Note: The testing scope of this panel includes these medications:    Tramadol  ====================================================================  For clinical consultation, please call [phone].  ====================================================================  Analysis performed by [employer] [place] [address]         Recent Labs   Lab Test  07/22/16   1122  05/12/15   0922  03/06/13   1034   WBC   --    --   6.7   RBC   --    --   4.24   HGB   --    --   12.8   HCT   --    --   37.7   MCV   --    --   89   RDW   --    --   12.6   PLT   --    --   240   BUN  13  11   --       Recent Labs   Lab Test  03/06/13   1034   TSH  0.83     Hemoglobin   Date Value Ref Range Status   03/06/2013 12.8 11.7 - 15.7 g/dL Final     Urea Nitrogen   Date Value Ref Range Status   07/22/2016 13 7 - 30 mg/dL Final   05/12/2015 11 7 - 30 mg/dL Final     Recent Labs   Lab Test  07/22/16   1122  05/12/15   0922  03/06/13   1034   WBC   --    --   6.7   HGB   --    --   12.8   HCT   --    --   37.7   MCV   --    --   89   PLT   --    --   240   BUN  13  11   --    TSH   --    --   0.83       Outside studies  reviewed:     I reviewed her previous rheumatology records from [facility]     JORGE, BRANDIN panel, complement levels, double-stranded DNA and rheumatoid serologies -negative - 2007, 2009, 2011.    2007   ssb - 18.13, ssa - 1.80.     CLINICAL HISTORY:  Injury.    Technique:  Three views.    Findings:  Question of some mild lateral soft tissue swelling.  No evidence for fracture dislocation.  No significant degenerative change.  Ankle mortise is congruent.  Bony alignment is anatomic.    Impression:  1. Question of some mild lateral soft tissue swelling.  2. No evidence for fracture or dislocation.      CLINICAL HISTORY:  Ankle injury.    Technique:  Three views.    Findings:  No evidence for fracture or dislocation.  No significant degenerative change.  Bony alignment is anatomic.    Impression:  Negative.    Result Impression     1. Mild degenerative changes of the sacroiliac joints. No signal   abnormality to suggest sacroiliitis.     2. Defect involving the posterior aspect of the right iliac bone likely   related to prior bone graft harvest. Susceptibility artifact lower lumbar   spine relates to keli and pedicle screw fusion.     3. Diffuse edema-like signal within the distal paraspinal musculature at   level of sacrum. Mild nonspecific subcutaneous edema-like signal. No   localized fluid collection.       Reviewed Rheumatology lab flowsheet    Nish Scanlon MD  [facility]   Pager - [phone]      Again, thank you for allowing me to participate in the care of your patient.      Sincerely,    Nish Scanlon MD     General
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
## 2024-03-14 NOTE — ED BEHAVIORAL HEALTH NOTE - BEHAVIORAL HEALTH NOTE
worker called  Shahana # [phone] at group [place] to confrim pt's return via ems. worker called  Shahana # [phone] at group [place] to confirm pt's return via ems.
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
## 2024-03-19 ENCOUNTER — APPOINTMENT (OUTPATIENT)
Dept: MAMMOGRAPHY | Facility: CLINIC | Age: 44
End: 2024-03-19
Payer: MEDICARE

## 2024-03-19 ENCOUNTER — APPOINTMENT (OUTPATIENT)
Dept: ULTRASOUND IMAGING | Facility: CLINIC | Age: 44
End: 2024-03-19
Payer: MEDICARE

## 2024-03-19 ENCOUNTER — RESULT REVIEW (OUTPATIENT)
Age: 44
End: 2024-03-19

## 2024-03-19 PROCEDURE — 77066 DX MAMMO INCL CAD BI: CPT

## 2024-03-19 PROCEDURE — G0279: CPT

## 2024-03-19 PROCEDURE — 76641 ULTRASOUND BREAST COMPLETE: CPT | Mod: 50,GA

## 2024-03-26 ENCOUNTER — NON-APPOINTMENT (OUTPATIENT)
Age: 44
End: 2024-03-26

## 2024-03-27 ENCOUNTER — NON-APPOINTMENT (OUTPATIENT)
Age: 44
End: 2024-03-27

## 2024-04-29 ENCOUNTER — NON-APPOINTMENT (OUTPATIENT)
Age: 44
End: 2024-04-29

## 2024-05-03 NOTE — H&P ADULT - NSHPROSALLOTHERNEGRD_GEN_ALL_CORE
All other review of systems negative, except as noted in HPI You can access the FollowMyHealth Patient Portal offered by [facility] by registering at the following website: [email or web address]. By joining [employer]’s FollowMyHealth portal, you will also be able to view your health information using other applications (apps) compatible with our system.

## 2024-05-06 DIAGNOSIS — R93.2 ABNORMAL FINDINGS ON DIAGNOSTIC IMAGING OF LIVER AND BILIARY TRACT: ICD-10-CM

## 2024-05-06 DIAGNOSIS — R92.8 OTHER ABNORMAL AND INCONCLUSIVE FINDINGS ON DIAGNOSTIC IMAGING OF BREAST: ICD-10-CM

## 2024-05-08 ENCOUNTER — APPOINTMENT (OUTPATIENT)
Dept: SURGICAL ONCOLOGY | Facility: CLINIC | Age: 44
End: 2024-05-08
Payer: MEDICARE

## 2024-05-08 VITALS
BODY MASS INDEX: 51.53 KG/M2 | OXYGEN SATURATION: 96 % | HEIGHT: 62 IN | SYSTOLIC BLOOD PRESSURE: 126 MMHG | WEIGHT: 280 LBS | HEART RATE: 110 BPM | DIASTOLIC BLOOD PRESSURE: 80 MMHG

## 2024-05-08 DIAGNOSIS — R92.8 OTHER ABNORMAL AND INCONCLUSIVE FINDINGS ON DIAGNOSTIC IMAGING OF BREAST: ICD-10-CM

## 2024-05-08 PROCEDURE — 99204 OFFICE O/P NEW MOD 45 MIN: CPT

## 2024-05-08 NOTE — PHYSICAL EXAM
[Normal] : supple, no neck mass and thyroid not enlarged [Normal Neck Lymph Nodes] : normal neck lymph nodes  [Normal Supraclavicular Lymph Nodes] : normal supraclavicular lymph nodes [Normal Groin Lymph Nodes] : normal groin lymph nodes [Normal Axillary Lymph Nodes] : normal axillary lymph nodes [Normal] : oriented to person, place and time, with appropriate affect [de-identified] : no masses or adenopathy bilaterally

## 2024-05-08 NOTE — ADDENDUM
[FreeTextEntry1] : I, Sushila Rodriguez, acted solely as a scribe for Dr. Willy Hay on this date 05/08/2024.

## 2024-05-08 NOTE — HISTORY OF PRESENT ILLNESS
[de-identified] : Patient is a 42 y/o female who presents an initial consultation for BIRADS 3 left breast mass detected on routine annual breast imaging.  Patient lives in a group home and is accompanied by the medical coordinator.   At left breast 3 o'clock 8 cm from the nipple is a superficial circumscribed echogenic mass with central lucency measuring 4 x 2 x 5 mm possibly hematoma. A targeted follow-up left mammo/sono left breast in 4-6 months is recommended.  (BI-RADS 3)  PMH includes osteoarthritis, hypothyroidism, bipolar disorder. No prior breast related issues. FH of breast cancer involving her aunt (unsure if maternal or paternal) Denies breast masses, nipple discharge, inversion or skin change.

## 2024-05-08 NOTE — CONSULT LETTER
[Dear  ___] : Dear  [unfilled], [Consult Letter:] : I had the pleasure of evaluating your patient, [unfilled]. [Please see my note below.] : Please see my note below. [Sincerely,] : Sincerely, [FreeTextEntry3] : Willy Hay MD FACS

## 2024-05-08 NOTE — ASSESSMENT
[FreeTextEntry1] : Left breast mass 3:00 likely resolving hematoma - BI-RADS 3 3/2024 Reassured patient and medical coordinator that index of suspicion for malignancy is low  Agree with 6 month f/u left breast US 9/2024 RTO prn

## 2024-05-13 ENCOUNTER — APPOINTMENT (OUTPATIENT)
Dept: ENDOCRINOLOGY | Facility: CLINIC | Age: 44
End: 2024-05-13

## 2024-05-15 ENCOUNTER — OUTPATIENT (OUTPATIENT)
Dept: OUTPATIENT SERVICES | Facility: HOSPITAL | Age: 44
LOS: 1 days | End: 2024-05-15
Payer: MEDICARE

## 2024-05-15 ENCOUNTER — APPOINTMENT (OUTPATIENT)
Dept: BARIATRICS/WEIGHT MGMT | Facility: CLINIC | Age: 44
End: 2024-05-15
Payer: MEDICARE

## 2024-05-15 VITALS — BODY MASS INDEX: 51.21 KG/M2 | WEIGHT: 280 LBS

## 2024-05-15 DIAGNOSIS — N91.2 AMENORRHEA, UNSPECIFIED: ICD-10-CM

## 2024-05-15 DIAGNOSIS — I10 ESSENTIAL (PRIMARY) HYPERTENSION: ICD-10-CM

## 2024-05-15 PROCEDURE — 99214 OFFICE O/P EST MOD 30 MIN: CPT | Mod: 95

## 2024-05-15 PROCEDURE — G0463: CPT

## 2024-05-15 NOTE — HISTORY OF PRESENT ILLNESS
[FreeTextEntry1] :  PATIENT WAS NOTIFIED THAT ANYTHING WE DISCUSSED IN OUR MEETING TODAY MAY BE REFLECTED IN WRITING IN THE VISIT NOTE WHICH WILL BE AVAILABLE TO OTHER MEDICAL PROVIDERS TO REVIEW AS PART OF ROUTINE PATIENT CARE.    Ms. LEO LADD is a 43 year year old female who presents for evaluation and treatment of Class 3 obesity.  Patient is a poor historian, unable to answer questions in detail regarding her medical health and lifestyle.     Interim: - I've assumed care of this pt today - today 280lbs er linnea Seo (last visit in Oct 261- up about 20lbs) - "i'm healthy, that's all", the medication she takes makes her hungry. gets hungry after 10pm -Food: BF- eggs, waffles, sausage, water, L- soup, juice, water, fruit, salad, D- rice, chicken, pasta w/ meatballs w/ cheese, sauce,  - Snacks on chips- it's her favorite thing, doesn't want to give up.  - PA- walks around the block, when she feels like it, goes to the stores, not daily   Obesity related co-morbidities: preDM, prolactinemia, hypotsh, arthritis - knees, depression/anxiety per chart bipolar mood d/o - was taking lithium now stopped, seroquel, and topiramate, depakote limitations with walking - knees. patient states she has knee pain, Rayray says she goes to Pain management, that when told her weight is contributing, Leo responded "I like food too much"  Per Rayray medical coordinator, Has Uncle, sometimes visits.  Patient lives - by herself, no roommates in a group home.  She is given a menu for what to eat and sometimes prepares quick food items by herself late at night - bread, eggs Employment status - disabled, group home - [employer], Rayray is present today - medical coordinator.  has nutritionist in house.    Has lived in group home, more than 6-7 years.  She's allowed to buy whatever she wants with her own money.   Weight History: since 2017 per chart.  Unable to obtain from patient or patient coordinator previous to the last year or so.  Lowest adult weight: 220 Highest adult weight: 290s   Obesity began - unknown.  Weight gain has occurred with:  Per Rayray - her weight Feb 2023 she was 277, March 280, April 265, and June 260.  She has been on Lithium for a long time, and she's stopped it recently

## 2024-05-15 NOTE — REASON FOR VISIT
[Home] : at home, [unfilled] , at the time of the visit. [Other Location: e.g. Home (Enter Location, City,State)___] : at [unfilled] [Patient] : the patient [Self] : self [Follow-Up] : a follow-up visit [FreeTextEntry1] : obesity

## 2024-05-15 NOTE — ASSESSMENT
[FreeTextEntry1] : Bariatric surgery history: none Obesity co- morbidities: preDM, prolactinemia, hypotsh, arthritis, depression/anxiety per chart Comorbidities improved or resolved: none Anti-obesity medications: topiramate for mood d/o  Obesity medication side effects: none  43 y.o F w Class 3 obesity, bipolar mood d/o, preDM, prolactinemia, hypotsh, late night eating, presents for weight management  - weight gain about 20lbs since last visit in Oct - pt has little insight and support system, does have aide that assists.  - Medications FDA approved for weight loss for ex. Wegovy, Zepbound, are not covered by Medicare and would be about $1,000 per month, out of pocket - will check new labs today, if pt w/ new onset diabetes, can order Ozempic for her which is covered for diabetics by Medicare - If no DM, then will  c/w Topiramate 200mb bid she is currently taking (which pt states "doesn't work") - Pt's last labs reveal Cr 1.7- Metformin not an option at this time.  - Will check labs to see if Cr improved and/or is HA1c in DM range.  - tried to explain to pt and aid, to have majority of calories earlier in the day, BF/L- to increase more WF/PB focus, adding starch for fullness - d/w her to avoid oils/fats/sugar/artifical sweeteners  - d/w her that if she has to have chips, have it before lunch - needs to avoid snacking after dinner, perhaps needs to sleep earlier - pt states she's been walking around the block and to the stores some days- encouraged to walk daily, having PT also - Lithium was discontinued due to kidney issues per Rayray, and therefore metformin would not be a good choice at this time. Already on topiramate, and would not recommend stimulants in this patient with bipolar d/o. No new medications were started at this visit  f/u - 1 month to review labs and options

## 2024-05-20 ENCOUNTER — LABORATORY RESULT (OUTPATIENT)
Age: 44
End: 2024-05-20

## 2024-05-20 DIAGNOSIS — R73.03 PREDIABETES: ICD-10-CM

## 2024-05-20 DIAGNOSIS — E03.9 HYPOTHYROIDISM, UNSPECIFIED: ICD-10-CM

## 2024-05-20 DIAGNOSIS — N91.2 AMENORRHEA, UNSPECIFIED: ICD-10-CM

## 2024-05-20 DIAGNOSIS — E66.01 MORBID (SEVERE) OBESITY DUE TO EXCESS CALORIES: ICD-10-CM

## 2024-05-21 LAB
25(OH)D3 SERPL-MCNC: 35.9 NG/ML
ALBUMIN SERPL ELPH-MCNC: 3.6 G/DL
ALP BLD-CCNC: 66 U/L
ALT SERPL-CCNC: 29 U/L
ANION GAP SERPL CALC-SCNC: 8 MMOL/L
AST SERPL-CCNC: 27 U/L
BASOPHILS # BLD AUTO: 0.07 K/UL
BASOPHILS NFR BLD AUTO: 0.9 %
BILIRUB SERPL-MCNC: 0.2 MG/DL
BUN SERPL-MCNC: 22 MG/DL
CALCIUM SERPL-MCNC: 8.4 MG/DL
CHLORIDE SERPL-SCNC: 107 MMOL/L
CHOLEST SERPL-MCNC: 189 MG/DL
CO2 SERPL-SCNC: 24 MMOL/L
CREAT SERPL-MCNC: 1.34 MG/DL
EGFR: 50 ML/MIN/1.73M2
EOSINOPHIL # BLD AUTO: 0.14 K/UL
EOSINOPHIL NFR BLD AUTO: 1.8 %
ESTIMATED AVERAGE GLUCOSE: 128 MG/DL
GLUCOSE SERPL-MCNC: 90 MG/DL
HBA1C MFR BLD HPLC: 6.1 %
HCT VFR BLD CALC: 40.5 %
HDLC SERPL-MCNC: 53 MG/DL
HGB BLD-MCNC: 12.5 G/DL
IMM GRANULOCYTES NFR BLD AUTO: 1.4 %
INSULIN SERPL-MCNC: 38.9 UU/ML
LDLC SERPL CALC-MCNC: 108 MG/DL
LYMPHOCYTES # BLD AUTO: 3.22 K/UL
LYMPHOCYTES NFR BLD AUTO: 42.1 %
MAN DIFF?: NORMAL
MCHC RBC-ENTMCNC: 30.9 GM/DL
MCHC RBC-ENTMCNC: 32.6 PG
MCV RBC AUTO: 105.5 FL
MONOCYTES # BLD AUTO: 0.8 K/UL
MONOCYTES NFR BLD AUTO: 10.5 %
NEUTROPHILS # BLD AUTO: 3.3 K/UL
NEUTROPHILS NFR BLD AUTO: 43.3 %
NONHDLC SERPL-MCNC: 136 MG/DL
PLATELET # BLD AUTO: 208 K/UL
POTASSIUM SERPL-SCNC: 4.5 MMOL/L
PROT SERPL-MCNC: 6.9 G/DL
RBC # BLD: 3.84 M/UL
RBC # FLD: 13.9 %
SODIUM SERPL-SCNC: 139 MMOL/L
TRIGL SERPL-MCNC: 166 MG/DL
TSH SERPL-ACNC: 5.79 UIU/ML
WBC # FLD AUTO: 7.64 K/UL

## 2024-05-21 RX ORDER — CEPHALEXIN 250 MG
CAPSULE ORAL DAILY
Qty: 1 | Refills: 3 | Status: ACTIVE | COMMUNITY
Start: 2024-05-21 | End: 1900-01-01

## 2024-05-23 ENCOUNTER — APPOINTMENT (OUTPATIENT)
Dept: ENDOCRINOLOGY | Facility: CLINIC | Age: 44
End: 2024-05-23
Payer: MEDICARE

## 2024-05-23 VITALS
WEIGHT: 275 LBS | SYSTOLIC BLOOD PRESSURE: 112 MMHG | BODY MASS INDEX: 50.61 KG/M2 | OXYGEN SATURATION: 98 % | HEART RATE: 108 BPM | HEIGHT: 62 IN | DIASTOLIC BLOOD PRESSURE: 70 MMHG

## 2024-05-23 PROCEDURE — G2211 COMPLEX E/M VISIT ADD ON: CPT

## 2024-05-23 PROCEDURE — 99214 OFFICE O/P EST MOD 30 MIN: CPT

## 2024-05-23 RX ORDER — LEVOTHYROXINE SODIUM 0.03 MG/1
25 TABLET ORAL
Qty: 90 | Refills: 1 | Status: ACTIVE | COMMUNITY
Start: 2024-05-23 | End: 1900-01-01

## 2024-05-23 RX ORDER — LEVOTHYROXINE SODIUM 0.2 MG/1
200 TABLET ORAL
Qty: 1 | Refills: 1 | Status: ACTIVE | COMMUNITY
Start: 2022-09-20 | End: 1900-01-01

## 2024-05-23 NOTE — ED ADULT NURSE NOTE - NS ED NOTE ABUSE RESPONSE YN
Adult Annual Physical  Name: Marbin Bowden      : 1968      MRN: [id number]  Encounter Provider: SELVIN Edmonds  Encounter Date: 2024   Encounter department: [facility]    Assessment & Plan   1. Annual physical exam  2. Mixed hyperlipidemia  Assessment & Plan:  Lab Results   Component Value Date    CHOLESTEROL 277 (H) 2022     Lab Results   Component Value Date    HDL 68 2022     Lab Results   Component Value Date    TRIG 106 2022     Lab Results   Component Value Date    NONHDLC 209 2022     Check lipid panel. Continue lipitor  Orders:  -     Lipid Panel with Direct LDL reflex; Future  3. Primary insomnia  Assessment & Plan:  Reports that  trazodone did not work. Trial vistaril. Discussed good sleep hygiene. F/u 3 months. Consider sleep med referral at next OV  Orders:  -     hydrOXYzine pamoate (VISTARIL) 25 mg capsule; Take 1 capsule (25 mg total) by mouth daily at bedtime  4. Primary hypertension  Assessment & Plan:  BP Readings from Last 3 Encounters:   24 120/80   01/10/24 126/84   23 137/85     - At goal. Continue candesartan  Orders:  -     candesartan (ATACAND) 8 MG tablet; Take 1 tablet (8 mg total) by mouth daily    Immunizations and preventive care screenings were discussed with patient today. Appropriate education was printed on patient's after visit summary.    Counseling:  Dental Health: discussed importance of regular tooth brushing, flossing, and dental visits.  Exercise: the importance of regular exercise/physical activity was discussed. Recommend exercise 3-5 times per week for at least 30 minutes.     BMI Counseling: Body mass index is 37.28 kg/m². The BMI is above normal. Nutrition recommendations include decreasing portion sizes, encouraging healthy choices of fruits and vegetables, decreasing fast food intake, consuming healthier snacks, limiting drinks that contain sugar, moderation in  "carbohydrate intake, increasing intake of lean protein, reducing intake of saturated and trans fat and reducing intake of cholesterol. Exercise recommendations include moderate physical activity 150 minutes/week. No pharmacotherapy was ordered. Rationale for BMI follow-up plan is due to patient being overweight or obese.     Depression Screening and Follow-up Plan: Patient was screened for depression during today's encounter. They screened negative with a PHQ-2 score of 0.        History of Present Illness     Adult Annual Physical:  Patient presents for annual physical.     Diet and Physical Activity:  - Diet/Nutrition: poor diet.  - Exercise: no formal exercise.    Depression Screening:  - PHQ-2 Score: 0    General Health:    - Hearing: normal hearing bilateral ears.  - Vision: wears glasses and goes for regular eye exams.  - Dental: regular dental visits and brushes teeth three times daily.    /GYN Health:  - Follows with GYN: no.   - Menopause: postmenopausal.   - History of STDs: no  - Contraception:. hysterectomy due to hx of dysplasia      Review of Systems   Constitutional:  Negative for chills and fever.   HENT:  Negative for ear pain and sore throat.    Eyes:  Negative for pain and visual disturbance.   Respiratory:  Negative for cough and shortness of breath.    Cardiovascular:  Negative for chest pain and palpitations.   Gastrointestinal:  Negative for abdominal pain and vomiting.   Genitourinary:  Negative for dysuria and hematuria.   Musculoskeletal:  Negative for arthralgias and back pain.   Skin:  Negative for color change and rash.   Neurological:  Negative for seizures and syncope.   Psychiatric/Behavioral:  Positive for sleep disturbance.    All other systems reviewed and are negative.        Objective     /80 Comment: manual  Pulse 91   Temp 98 °F (36.7 °C) (Temporal)   Resp 16   Ht 5' 6\" (1.676 m)   Wt 105 kg (231 lb)   SpO2 99%   BMI 37.28 kg/m²     Physical Exam  Vitals and " nursing note reviewed.   Constitutional:       General: She is not in acute distress.     Appearance: Normal appearance. She is well-developed. She is obese.   HENT:      Head: Normocephalic and atraumatic.      Right Ear: External ear normal.      Left Ear: External ear normal.      Nose: Nose normal.   Eyes:      Conjunctiva/sclera: Conjunctivae normal.   Cardiovascular:      Rate and Rhythm: Normal rate and regular rhythm.      Pulses: Normal pulses.      Heart sounds: Normal heart sounds. No murmur heard.  Pulmonary:      Effort: Pulmonary effort is normal. No respiratory distress.      Breath sounds: Normal breath sounds.   Abdominal:      Palpations: Abdomen is soft.      Tenderness: There is no abdominal tenderness.   Musculoskeletal:         General: Normal range of motion.      Cervical back: Normal range of motion and neck supple.   Skin:     General: Skin is warm and dry.   Neurological:      General: No focal deficit present.      Mental Status: She is alert and oriented to person, place, and time. Mental status is at baseline.   Psychiatric:         Mood and Affect: Mood normal.         Behavior: Behavior normal.         Thought Content: Thought content normal.         Judgment: Judgment normal.       Administrative Statements          Yes

## 2024-05-23 NOTE — ASSESSMENT
[FreeTextEntry1] : 43 year old female with history of bipolar disease here for follow up of hyperprolactinemia.  No signs of overt hyperprolactinemia at this time. She was showing erratic behavior during the appointment after discontinuation of lithium. Will be checking thyroid function, unable to get proper clinical history  Hyperprolactinemia: Can check prolactin and IGF-I levels at the next visit.  Patient just had blood work done. Previously with elevated prolactin due to the patient's antipsychotic medications.  Hypothyroidism: -Increase to Levothyroxine 225 mcg daily. Recent TSH of 5.79, increase levothyroxine from 200 to 225 mcg once daily.  New medications are sent in papers are filled out for the patient's group home.  Follow-up in October with Dr. Cali as scheduled

## 2024-05-23 NOTE — REVIEW OF SYSTEMS
[Fatigue] : no fatigue [Decreased Appetite] : appetite not decreased [Visual Field Defect] : no visual field defect [Dysphagia] : no dysphagia [Dysphonia] : no dysphonia [Chest Pain] : no chest pain [Palpitations] : no palpitations [Shortness Of Breath] : no shortness of breath [Nausea] : no nausea [Vomiting] : no vomiting [Polyuria] : no polyuria [Irregular Menses] : regular menses [Joint Pain] : no joint pain [Muscle Weakness] : no muscle weakness [Acanthosis] : no acanthosis  [Acne] : no acne [Headaches] : no headaches [Tremors] : no tremors [Depression] : no depression [Polydipsia] : no polydipsia [Cold Intolerance] : no cold intolerance [Easy Bleeding] : no ~M tendency for easy bleeding [Easy Bruising] : no tendency for easy bruising

## 2024-05-23 NOTE — HISTORY OF PRESENT ILLNESS
[FreeTextEntry1] : Ms. Gu is a 43 year old female with long term history of bipolar disease, hypothyroidism who lives in a group home presented today for evaluation of hyperprolactenemia along with hypothyroidism.   Previous HPI: ------------------------------------------------------------------------------------------------------------------------------------------ As per the health aid, patient's last period was in 10/2017 and recently her Ob/gyn found her to have an elevated prolactin level to 101. A subsequent pituitary MRI was negative for any adenoma. She has been managed on benztropine, seroquel and Invega as her anti-psychotics. Otherwise, denied any galactorrhea, nausea or dizziness. Patient was overall a poor historian.  ------------------------------------------------------------------------- Change in medication:  Recent change in lithium 300-600 mg BID, [id number] mg daily, Klonipin 1 mg daily, Topiramate 200 mg daily   Divalproex ( 500 mg) 7 am-4 pm Quetiapine 400 mg daily  Topiramate 200 mg daily   Symptoms:  No breast discharge  Reported regular periods at this time 3 Reported that she has been constipated  Meds:  -Calcium 600 mg  -Clonazepam 1 mg twice daily  -Levothyroxine 200 mcg daily  -Omega 3 BID  -Quetiapine 400 mg daily  -Topiramate 200 mg daily

## 2024-06-08 NOTE — ED PROVIDER NOTE - DISPOSITION TYPE
HPI   Chief Complaint   Patient presents with    Abdominal Pain            Pt is a 64-year-old male with history of stage IV esophageal carcinoma with mets to liver and bone, bradycardia status post pacemaker that presents emergency room for left lower quadrant abdominal pain.  Patient states that the abdominal pain has been there however has intensified over the past week.  Patient was recently seen about 2 days ago at [facility] where there was some concern of a partial bowel obstruction that could not be excluded on CT abdomen given the dilation of the jejunum.  Initially they wanted to transfer him over to [facility] however there was no beds and with some improvement patient was discharged.  Patient states that at 3 in the morning he started having worsening abdominal pain with nausea, no vomiting and 1 bout of diarrhea.  Patient states his last bowel movement within 1 hour and passing gas.  Patient states that he is not currently on chemotherapy in the last dose he Was 4 to 6 weeks ago.  Patient is scheduled to get his next round of chemotherapy in July.  Of note, patient had a spinal surgery about a month ago for removal of spinal cord mets in the setting of possible cord compression.  Patient is denying fever, cough, congestion, shortness of breath, chest pain.                  [place] Coma Scale Score: 15                     Patient History   Past Medical History:   Diagnosis Date    Essential (primary) hypertension 12/21/2013    Hypertension    Pacemaker     Peripheral neuropathy     Personal history of other diseases of the circulatory system     History of right bundle branch block (RBBB)    Pneumothorax 12/04/2023     Past Surgical History:   Procedure Laterality Date    CARDIAC ELECTROPHYSIOLOGY PROCEDURE N/A 11/7/2023    Procedure: Temporary Pacemaker Insertion;  Surgeon: Alexis Shook MD;  Location: [facility] Cardiac Cath Lab;  Service: Cardiovascular;  Laterality: N/A;    CARDIAC ELECTROPHYSIOLOGY PROCEDURE Left  11/9/2023    Procedure: PPM IMPLANT DUAL;  Surgeon: Elias Connolly MD;  Location: [facility] Cardiac Cath Lab;  Service: Electrophysiology;  Laterality: Left;    TOTAL KNEE ARTHROPLASTY  09/30/2016    Total Knee Replacement Left    TOTAL KNEE ARTHROPLASTY  09/30/2016    Total Knee Replacement Right     Family History   Problem Relation Name Age of Onset    Cancer Father       Social History     Tobacco Use    Smoking status: Former     Types: Cigarettes, Cigars     Passive exposure: Never    Smokeless tobacco: Never   Vaping Use    Vaping status: Unknown   Substance Use Topics    Alcohol use: Not Currently    Drug use: Yes     Types: Marijuana     Comment: medical marjuana card       Physical Exam   ED Triage Vitals [06/08/24 1805]   Temperature Heart Rate Respirations BP   36.6 °C (97.9 °F) 97 18 132/74      Pulse Ox Temp src Heart Rate Source Patient Position   98 % -- -- --      BP Location FiO2 (%)     -- --       Physical Exam  Constitutional:       Appearance: He is well-developed and normal weight.   HENT:      Head: Normocephalic and atraumatic.      Mouth/Throat:      Mouth: Mucous membranes are moist.   Eyes:      Extraocular Movements: Extraocular movements intact.      Pupils: Pupils are equal, round, and reactive to light.   Cardiovascular:      Rate and Rhythm: Regular rhythm. Tachycardia present.   Pulmonary:      Effort: Pulmonary effort is normal.      Breath sounds: Normal breath sounds.   Abdominal:      General: Abdomen is flat. Bowel sounds are increased.      Tenderness: There is abdominal tenderness in the left lower quadrant.      Hernia: No hernia is present.      Comments: Slight protuberant over the left lower quadrant, no guarding, no rebound tenderness, abdomen soft   Skin:     General: Skin is warm.   Neurological:      General: No focal deficit present.      Mental Status: He is alert and oriented to person, place, and time.   Psychiatric:         Mood and Affect: Mood normal.          Behavior: Behavior normal.         ED Course & MDM   Diagnoses as of 06/09/24 0313   Colitis       Medical Decision Making  Pt is a 64-year-old male with history of stage IV esophageal carcinoma with mets to liver and bone, bradycardia status post pacemaker that presents emergency room for left lower quadrant abdominal pain.  Differential diagnosis includes partial bowel obstruction, diverticulitis, colitis.  EKG CT abdomen pelvis shows no evidence of bowel obstruction, a short segment of circumferential thickening of ascending colon superior to the cecum likely represents colitis.  There is redemonstration of metastatic disease.  CBC shows a microcytic anemia with a hemoglobin of 9.3 similar to baseline.  CMP shows a elevated alkaline phosphatase of 202 possibly in setting of metastasis.  Patient additionally has a elevated bilirubin at 1.4 however similar to baseline.  Lipase is less than 8.  UA is within normal limits. EKG shows sinus bradycardia with first-degree AV block, no axis deviation, heart rate of 47, OH of 232, QRS of 104, QTc of 354, no ST elevations, no ST depressions, T wave inversion seen in V5, V4 however similar to previous EKGs.  Patient is complaining of some pain given 1 mg of Dilaudid to help with the pain.  Patient was admitted to oncology in the setting of colitis along with intractable pain likely due to metastatic carcinoma.  Prior to being admitted patient has been vitally stable.  Patient's heart rate is noted to go as low as 48 however shoots back up to 90s to 100 likely in setting of his pacemaker.  EKG showed spikes before each QRS complex.    Procedure  Procedures     Amber Younger MD  Resident  06/09/24 [id number]     DISCHARGE

## 2024-06-10 ENCOUNTER — NON-APPOINTMENT (OUTPATIENT)
Age: 44
End: 2024-06-10

## 2024-06-18 ENCOUNTER — APPOINTMENT (OUTPATIENT)
Dept: BARIATRICS/WEIGHT MGMT | Facility: CLINIC | Age: 44
End: 2024-06-18
Payer: MEDICARE

## 2024-06-18 ENCOUNTER — TRANSCRIPTION ENCOUNTER (OUTPATIENT)
Age: 44
End: 2024-06-18

## 2024-06-18 ENCOUNTER — OUTPATIENT (OUTPATIENT)
Dept: OUTPATIENT SERVICES | Facility: HOSPITAL | Age: 44
LOS: 1 days | End: 2024-06-18
Payer: MEDICARE

## 2024-06-18 VITALS — WEIGHT: 277 LBS | BODY MASS INDEX: 50.66 KG/M2

## 2024-06-18 DIAGNOSIS — E78.1 PURE HYPERGLYCERIDEMIA: ICD-10-CM

## 2024-06-18 DIAGNOSIS — E66.01 MORBID (SEVERE) OBESITY DUE TO EXCESS CALORIES: ICD-10-CM

## 2024-06-18 DIAGNOSIS — R73.03 PREDIABETES.: ICD-10-CM

## 2024-06-18 DIAGNOSIS — E03.9 HYPOTHYROIDISM, UNSPECIFIED: ICD-10-CM

## 2024-06-18 DIAGNOSIS — N18.30 CHRONIC KIDNEY DISEASE, STAGE 3 UNSPECIFIED: ICD-10-CM

## 2024-06-18 DIAGNOSIS — E78.5 HYPERLIPIDEMIA, UNSPECIFIED: ICD-10-CM

## 2024-06-18 DIAGNOSIS — E16.1 OTHER HYPOGLYCEMIA: ICD-10-CM

## 2024-06-18 DIAGNOSIS — I10 ESSENTIAL (PRIMARY) HYPERTENSION: ICD-10-CM

## 2024-06-18 PROCEDURE — G0463: CPT

## 2024-06-18 PROCEDURE — 99214 OFFICE O/P EST MOD 30 MIN: CPT | Mod: 95

## 2024-06-18 NOTE — ASSESSMENT
[FreeTextEntry1] : Bariatric surgery history: none Obesity co- morbidities: preDM, prolactinemia, hypthyroid, HLD, hyperinsulin, CKD3 Comorbidities improved or resolved: none Anti-obesity medications: topiramate for mood d/o  Obesity medication side effects: none  43 y.o F w Class 3 obesity, bipolar mood d/o, preDM, prolactinemia, hypotsh, late night eating, presents for weight management  - weight gain about 20lbs since visit in Oct, dropped slightly since last visit 1 month ago- indicates, pt needs close monthly follow up to ensure appropriate wt loss and improve health parameters - pt has little insight and support system, does have aide that assists.  - Medications FDA approved for weight loss for ex. Wegovy, Zepbound, are not covered by Medicare and would be about $1,000 per month, out of pocket -  c/w Topiramate 200mb bid she is currently taking (which pt states "doesn't work") - Pt's last labs reveal Cr 1.34- Metformin not an option at this time, given likely Chronic Kidney disease - tried to explain to pt and aid, to have majority of calories earlier in the day, BF/L- to increase more whole foods and plant based focus, adding starch, such as potato, beans, grains, for fullness. - She needs to minimize fast food, processed and refined foods-  - avoid oils/fats/sugar/artifical sweeteners - do not put butter/cheese on baked potato or pasta or rice.  - d/w her that if she has to have chips, have it before lunch and only 3x/week - needs to avoid snacking after dinner, perhaps needs to sleep earlier - physical activity daily - gets PT, walks and bikes- needs to do these at least 30mins/day - Lithium was discontinued due to kidney issues per Rayray, and therefore metformin would not be a good choice at this time. Already on topiramate, and would not recommend stimulants in this patient with bipolar d/o. No new medications were started at this visit - For CKD stage 3- pt needs evaluation by nephrologist - Pt has high insulin level, prediabetic and elevated Triglycerides- all indicators of metabolic syndrome that will lead to worsening health conditions such as diabetes and heart disease, worsening kidney function. She needs drastic change in her nutrition habits to help reverse some of this or slow progression to worsening disease.  - Send nutrition resources ( the plate) - c/w taking multivitamin   f/u - 1 month

## 2024-06-18 NOTE — REASON FOR VISIT
[Home] : at home, [unfilled] , at the time of the visit. [Medical Office: ([facility])___] : at the medical office located in  [Patient] : the patient [Follow-Up] : a follow-up visit [FreeTextEntry1] : obesity

## 2024-06-18 NOTE — HISTORY OF PRESENT ILLNESS
[FreeTextEntry1] : PATIENT WAS NOTIFIED THAT ANYTHING WE DISCUSSED IN OUR MEETING TODAY MAY BE REFLECTED IN WRITING IN THE VISIT NOTE WHICH WILL BE AVAILABLE TO OTHER MEDICAL PROVIDERS TO REVIEW AS PART OF ROUTINE PATIENT CARE.    Ms. LEO LADD is a 43 year year old female who presents for evaluation and treatment of Class 3 obesity.  Patient is a poor historian, unable to answer questions in detail regarding her medical health and lifestyle.     Interim: - presents w/ aideRayray, assists her w/ history - Food: BF- fruits, toast and jelly, cup of OJ, waffles, HB eggs, cruz/sausage, water, snacks on chips, L-salad, fruit, beef franck, D- pasta, rice, chicken, veggies- limited by what is served at group home.  - PA- doing PT, biking more, walks daily  - water- drinking a lot  - today 277 (down 3 lbs from last visit)- attributes it to increased walking. (visit in Oct 261- up about 20lbs, now working on WL again)   Obesity related co-morbidities: preDM, prolactinemia, hypotsh, arthritis - knees, depression/anxiety per chart bipolar mood d/o - was taking lithium now stopped, seroquel, and topiramate, depakote limitations with walking - knees. patient states she has knee pain, Rayray says she goes to Pain management, that when told her weight is contributing, Leo responded "I like food too much"  Per Rayray medical coordinator, Has Uncle, sometimes visits.  Patient lives - by herself, no roommates in a group home.  She is given a menu for what to eat and sometimes prepares quick food items by herself late at night - bread, eggs Employment status - disabled, group home - [employer], Rayray is present today - medical coordinator.  has nutritionist in house.    Has lived in group home, more than 6-7 years.  She's allowed to buy whatever she wants with her own money.   Weight History: since 2017 per chart.  Unable to obtain from patient or patient coordinator previous to the last year or so.  Lowest adult weight: 220 Highest adult weight: 290s   Obesity began - unknown.  Weight gain has occurred with:  Per Rayray - her weight Feb 2023 she was 277, March 280, April 265, and Faiza 260.  She has been on Lithium for a long time, and she's stopped it recently

## 2024-06-24 DIAGNOSIS — E78.1 PURE HYPERGLYCERIDEMIA: ICD-10-CM

## 2024-06-24 DIAGNOSIS — E78.5 HYPERLIPIDEMIA, UNSPECIFIED: ICD-10-CM

## 2024-06-24 DIAGNOSIS — E16.1 OTHER HYPOGLYCEMIA: ICD-10-CM

## 2024-06-24 DIAGNOSIS — N18.30 CHRONIC KIDNEY DISEASE, STAGE 3 UNSPECIFIED: ICD-10-CM

## 2024-06-24 DIAGNOSIS — E66.01 MORBID (SEVERE) OBESITY DUE TO EXCESS CALORIES: ICD-10-CM

## 2024-06-24 DIAGNOSIS — R73.03 PREDIABETES: ICD-10-CM

## 2024-06-24 DIAGNOSIS — E03.9 HYPOTHYROIDISM, UNSPECIFIED: ICD-10-CM

## 2024-07-11 ENCOUNTER — EMERGENCY (EMERGENCY)
Facility: HOSPITAL | Age: 44
LOS: 1 days | Discharge: HOME | End: 2024-07-11
Admitting: EMERGENCY MEDICINE
Payer: MEDICARE

## 2024-07-11 VITALS
SYSTOLIC BLOOD PRESSURE: 142 MMHG | HEART RATE: 96 BPM | RESPIRATION RATE: 18 BRPM | DIASTOLIC BLOOD PRESSURE: 86 MMHG | OXYGEN SATURATION: 96 % | TEMPERATURE: 98 F

## 2024-07-11 VITALS
TEMPERATURE: 98 F | RESPIRATION RATE: 16 BRPM | SYSTOLIC BLOOD PRESSURE: 135 MMHG | HEART RATE: 110 BPM | DIASTOLIC BLOOD PRESSURE: 98 MMHG | OXYGEN SATURATION: 98 %

## 2024-07-11 PROCEDURE — 99284 EMERGENCY DEPT VISIT MOD MDM: CPT

## 2024-09-03 ENCOUNTER — RESULT REVIEW (OUTPATIENT)
Age: 44
End: 2024-09-03

## 2024-09-03 ENCOUNTER — APPOINTMENT (OUTPATIENT)
Dept: MAMMOGRAPHY | Facility: CLINIC | Age: 44
End: 2024-09-03
Payer: MEDICARE

## 2024-09-03 ENCOUNTER — APPOINTMENT (OUTPATIENT)
Dept: ULTRASOUND IMAGING | Facility: CLINIC | Age: 44
End: 2024-09-03
Payer: MEDICARE

## 2024-09-03 PROCEDURE — 76642 ULTRASOUND BREAST LIMITED: CPT | Mod: LT

## 2024-09-03 PROCEDURE — 77065 DX MAMMO INCL CAD UNI: CPT | Mod: LT

## 2024-09-03 PROCEDURE — G0279: CPT | Mod: LT

## 2024-09-11 ENCOUNTER — APPOINTMENT (OUTPATIENT)
Dept: SURGICAL ONCOLOGY | Facility: CLINIC | Age: 44
End: 2024-09-11
Payer: MEDICARE

## 2024-09-11 VITALS
OXYGEN SATURATION: 95 % | DIASTOLIC BLOOD PRESSURE: 80 MMHG | HEIGHT: 62 IN | SYSTOLIC BLOOD PRESSURE: 113 MMHG | WEIGHT: 277 LBS | BODY MASS INDEX: 50.97 KG/M2 | HEART RATE: 110 BPM

## 2024-09-11 DIAGNOSIS — R92.8 OTHER ABNORMAL AND INCONCLUSIVE FINDINGS ON DIAGNOSTIC IMAGING OF BREAST: ICD-10-CM

## 2024-09-11 PROCEDURE — 99214 OFFICE O/P EST MOD 30 MIN: CPT

## 2024-09-11 NOTE — PHYSICAL EXAM
[Normal] : supple, no neck mass and thyroid not enlarged [Normal Neck Lymph Nodes] : normal neck lymph nodes  [Normal Supraclavicular Lymph Nodes] : normal supraclavicular lymph nodes [Normal Groin Lymph Nodes] : normal groin lymph nodes [Normal Axillary Lymph Nodes] : normal axillary lymph nodes [Normal] : oriented to person, place and time, with appropriate affect [de-identified] : no masses or adenopathy bilaterally

## 2024-09-11 NOTE — HISTORY OF PRESENT ILLNESS
[de-identified] : Patient is a 43 y/o female who presents a follow up. Patient lives in a group home and is accompanied by the medical coordinator.   Her 6 month f/u L MMG/US 9/3/2024 showed Interval resolution of small left breast nodules and interval improvement of hematoma in the left breast at 3 o'clock. No mammographic or sonographic evidence of malignancy. BIRADS 3  She was initially seen for BIRADS 3 left breast mass detected on routine annual breast imaging.    At left breast 3 o'clock 8 cm from the nipple is a superficial circumscribed echogenic mass with central lucency measuring 4 x 2 x 5 mm possibly hematoma. A targeted follow-up left mammo/sono left breast in 4-6 months is recommended.  (BI-RADS 3)  PMH includes osteoarthritis, hypothyroidism, bipolar disorder. No prior breast related issues. FH of breast cancer involving her aunt (unsure if maternal or paternal) Denies breast masses, nipple discharge, inversion or skin change.

## 2024-09-11 NOTE — ASSESSMENT
[FreeTextEntry1] : Left breast mass 3:00 resolving hematoma  BIRADS 3 - 6 month f/u left breast US 9/2024 Normal PE  Cont yearly breast imaging 2/2025  RTO 6 months

## 2024-09-11 NOTE — ADDENDUM
[FreeTextEntry1] : I, Sushila Rodriguez, acted solely as a scribe for Dr. Willy Hay on this date 09/11/2024.

## 2024-09-26 ENCOUNTER — OUTPATIENT (OUTPATIENT)
Dept: OUTPATIENT SERVICES | Facility: HOSPITAL | Age: 44
LOS: 1 days | End: 2024-09-26
Payer: MEDICARE

## 2024-09-26 ENCOUNTER — APPOINTMENT (OUTPATIENT)
Dept: BARIATRICS/WEIGHT MGMT | Facility: CLINIC | Age: 44
End: 2024-09-26
Payer: MEDICARE

## 2024-09-26 VITALS — BODY MASS INDEX: 53.55 KG/M2 | WEIGHT: 291 LBS | HEIGHT: 62 IN

## 2024-09-26 DIAGNOSIS — N18.30 CHRONIC KIDNEY DISEASE, STAGE 3 UNSPECIFIED: ICD-10-CM

## 2024-09-26 DIAGNOSIS — I10 ESSENTIAL (PRIMARY) HYPERTENSION: ICD-10-CM

## 2024-09-26 DIAGNOSIS — E78.5 HYPERLIPIDEMIA, UNSPECIFIED: ICD-10-CM

## 2024-09-26 DIAGNOSIS — R73.03 PREDIABETES.: ICD-10-CM

## 2024-09-26 DIAGNOSIS — E66.01 MORBID (SEVERE) OBESITY DUE TO EXCESS CALORIES: ICD-10-CM

## 2024-09-26 PROCEDURE — G0463: CPT

## 2024-09-26 PROCEDURE — G2211 COMPLEX E/M VISIT ADD ON: CPT | Mod: 95

## 2024-09-26 PROCEDURE — 99213 OFFICE O/P EST LOW 20 MIN: CPT | Mod: 95

## 2024-09-26 RX ORDER — TIRZEPATIDE 2.5 MG/.5ML
2.5 INJECTION, SOLUTION SUBCUTANEOUS
Qty: 1 | Refills: 1 | Status: ACTIVE | COMMUNITY
Start: 2024-09-26 | End: 1900-01-01

## 2024-09-26 NOTE — ASSESSMENT
[FreeTextEntry1] : 43 yo female with class 3 obesity, requires medical weight loss therapy due to weight history and the positive effects weight loss can have on her comorbid conditions pre-dm, hld, oa:  -she follows with a pcp regularly  -The group home agrees to pay for Zepbound. I told them to inform me if they find another medicine that ends up being cheaper - wegovy, ozempic, etc.  -risk of increased constipation discussed, she is on senna and colace  -follow up 1 month

## 2024-09-26 NOTE — REASON FOR VISIT
[Home] : at home, [unfilled] , at the time of the visit. [Medical Office: ([facility])___] : at the medical office located in  [Patient] : the patient

## 2024-09-26 NOTE — HISTORY OF PRESENT ILLNESS
[FreeTextEntry1] : Anti-obesity medications: topiramate (for mood) Obesity medication side effects: Bariatric surgery history: Obesity co-morbidities: hld, oa, pre-dm Co-morbidities improved or resolved: highest adult weight: 290s  lowest adult weight: 220s  last weight: current weight: 291 other pmhx: bipolar, preDM, hyperprolactinemia (due to anti-psychotic meds) hypothyroid, OA knees, depression/anxiety, ckd3 , abnormal breast sono meds: prior use of lithium (now discontinued), seroquel, topriamate, depakote  her brother is diabetic, her mom was diabetic, her aunt dm and  from covid, aunt with breast cancer (this is all from the patient, she knows her family history)  43 yo female with class 3 obesity, she eats a lot and reports having good appetite, she reports her weight is 291 - which is a significant difference from 277 reported  but they report that this is in error - the scale at the group home said 291 . (see all the monthly weights below)  The nurse is present. She cannot confirm the family history but the patient knew this herself. No contraindications to GLP therapy: history of pancreatitis, personal or family history of medullary thyroid cancer or MEN2, no history of severe GI disease, no h/o diabetic retinopathy, type 1 DM, concomitant use of insulin secretagogues or insulin Patient is not: pregnant or planning pregnancy, breastfeeding  miri 259 feb 266 march 267 april 272 may 280 Faiza 280 july 282 sept 291  social: lives in group home, disabled, can buy what she wants but food provided   from chart: PA: PT, bikes, walks but pain limited to OA, goes to pain management  diet:  lives at group home so options are limited breakfast: fruit, toast and jelly, oj , waffles, cruz and eggs lunch: salad, fruit, beef franck  dinner: pasta, rice, chicken, vege  snack: chips  drinks:

## 2024-09-30 DIAGNOSIS — N18.30 CHRONIC KIDNEY DISEASE, STAGE 3 UNSPECIFIED: ICD-10-CM

## 2024-09-30 DIAGNOSIS — R73.03 PREDIABETES: ICD-10-CM

## 2024-09-30 DIAGNOSIS — E78.5 HYPERLIPIDEMIA, UNSPECIFIED: ICD-10-CM

## 2024-09-30 DIAGNOSIS — E66.01 MORBID (SEVERE) OBESITY DUE TO EXCESS CALORIES: ICD-10-CM

## 2024-10-25 ENCOUNTER — APPOINTMENT (OUTPATIENT)
Dept: ENDOCRINOLOGY | Facility: CLINIC | Age: 44
End: 2024-10-25

## 2024-10-25 ENCOUNTER — APPOINTMENT (OUTPATIENT)
Dept: ENDOCRINOLOGY | Facility: CLINIC | Age: 44
End: 2024-10-25
Payer: MEDICARE

## 2024-10-25 VITALS — DIASTOLIC BLOOD PRESSURE: 68 MMHG | OXYGEN SATURATION: 98 % | SYSTOLIC BLOOD PRESSURE: 128 MMHG | HEART RATE: 104 BPM

## 2024-10-25 DIAGNOSIS — R73.03 PREDIABETES.: ICD-10-CM

## 2024-10-25 DIAGNOSIS — E03.9 HYPOTHYROIDISM, UNSPECIFIED: ICD-10-CM

## 2024-10-25 DIAGNOSIS — E66.813 OBESITY, CLASS 3: ICD-10-CM

## 2024-10-25 DIAGNOSIS — E22.1 HYPERPROLACTINEMIA: ICD-10-CM

## 2024-10-25 PROCEDURE — 99215 OFFICE O/P EST HI 40 MIN: CPT

## 2024-10-25 PROCEDURE — G2211 COMPLEX E/M VISIT ADD ON: CPT

## 2024-10-27 PROBLEM — E11.9 TYPE 2 DIABETES MELLITUS: Status: ACTIVE | Noted: 2024-10-27

## 2024-10-27 LAB
ALBUMIN SERPL ELPH-MCNC: 3.7 G/DL
ALP BLD-CCNC: 71 U/L
ALT SERPL-CCNC: 26 U/L
ANION GAP SERPL CALC-SCNC: 12 MMOL/L
AST SERPL-CCNC: 25 U/L
BILIRUB SERPL-MCNC: <0.2 MG/DL
BUN SERPL-MCNC: 21 MG/DL
CALCIUM SERPL-MCNC: 9 MG/DL
CHLORIDE SERPL-SCNC: 108 MMOL/L
CO2 SERPL-SCNC: 24 MMOL/L
CREAT SERPL-MCNC: 1.55 MG/DL
EGFR: 42 ML/MIN/1.73M2
ESTIMATED AVERAGE GLUCOSE: 134 MG/DL
GLUCOSE SERPL-MCNC: 152 MG/DL
HBA1C MFR BLD HPLC: 6.3 %
POTASSIUM SERPL-SCNC: 4.1 MMOL/L
PROLACTIN SERPL-MCNC: 22 NG/ML
PROT SERPL-MCNC: 7.1 G/DL
SODIUM SERPL-SCNC: 144 MMOL/L
T4 FREE SERPL-MCNC: 0.9 NG/DL
TSH SERPL-ACNC: 6.29 UIU/ML

## 2024-10-28 RX ORDER — SEMAGLUTIDE 0.68 MG/ML
2 INJECTION, SOLUTION SUBCUTANEOUS
Qty: 1 | Refills: 1 | Status: ACTIVE | COMMUNITY
Start: 2024-10-28 | End: 1900-01-01

## 2024-11-18 ENCOUNTER — APPOINTMENT (OUTPATIENT)
Dept: BARIATRICS/WEIGHT MGMT | Facility: CLINIC | Age: 44
End: 2024-11-18

## 2024-12-19 ENCOUNTER — APPOINTMENT (OUTPATIENT)
Dept: BARIATRICS/WEIGHT MGMT | Facility: CLINIC | Age: 44
End: 2024-12-19
Payer: MEDICARE

## 2024-12-19 ENCOUNTER — OUTPATIENT (OUTPATIENT)
Dept: OUTPATIENT SERVICES | Facility: HOSPITAL | Age: 44
LOS: 1 days | End: 2024-12-19
Payer: MEDICARE

## 2024-12-19 DIAGNOSIS — R73.03 PREDIABETES.: ICD-10-CM

## 2024-12-19 DIAGNOSIS — I10 ESSENTIAL (PRIMARY) HYPERTENSION: ICD-10-CM

## 2024-12-19 DIAGNOSIS — E66.813 OBESITY, CLASS 3: ICD-10-CM

## 2024-12-19 PROCEDURE — 99215 OFFICE O/P EST HI 40 MIN: CPT | Mod: 95

## 2024-12-19 PROCEDURE — G0463: CPT

## 2024-12-23 DIAGNOSIS — E66.813 OBESITY, CLASS 3: ICD-10-CM

## 2024-12-23 DIAGNOSIS — R73.03 PREDIABETES: ICD-10-CM

## 2025-01-03 ENCOUNTER — NON-APPOINTMENT (OUTPATIENT)
Age: 45
End: 2025-01-03

## 2025-01-06 RX ORDER — SEMAGLUTIDE 1.34 MG/ML
4 INJECTION, SOLUTION SUBCUTANEOUS
Qty: 1 | Refills: 1 | Status: ACTIVE | COMMUNITY
Start: 2025-01-06

## 2025-02-26 ENCOUNTER — NON-APPOINTMENT (OUTPATIENT)
Age: 45
End: 2025-02-26

## 2025-02-26 RX ORDER — SEMAGLUTIDE 2.68 MG/ML
8 INJECTION, SOLUTION SUBCUTANEOUS
Qty: 3 | Refills: 1 | Status: ACTIVE | COMMUNITY
Start: 2025-02-26 | End: 1900-01-01

## 2025-03-13 ENCOUNTER — RESULT REVIEW (OUTPATIENT)
Age: 45
End: 2025-03-13

## 2025-03-13 ENCOUNTER — APPOINTMENT (OUTPATIENT)
Dept: ULTRASOUND IMAGING | Facility: CLINIC | Age: 45
End: 2025-03-13
Payer: MEDICARE

## 2025-03-13 ENCOUNTER — APPOINTMENT (OUTPATIENT)
Dept: MAMMOGRAPHY | Facility: CLINIC | Age: 45
End: 2025-03-13
Payer: MEDICARE

## 2025-03-13 PROCEDURE — 76641 ULTRASOUND BREAST COMPLETE: CPT | Mod: 50,TC,GA

## 2025-03-13 PROCEDURE — 77067 SCR MAMMO BI INCL CAD: CPT | Mod: 26

## 2025-03-13 PROCEDURE — 77063 BREAST TOMOSYNTHESIS BI: CPT | Mod: TC

## 2025-03-15 ENCOUNTER — EMERGENCY (EMERGENCY)
Facility: HOSPITAL | Age: 45
LOS: 0 days | Discharge: HOME | End: 2025-03-15
Attending: STUDENT IN AN ORGANIZED HEALTH CARE EDUCATION/TRAINING PROGRAM
Payer: MEDICARE

## 2025-03-15 VITALS
SYSTOLIC BLOOD PRESSURE: 110 MMHG | DIASTOLIC BLOOD PRESSURE: 80 MMHG | OXYGEN SATURATION: 99 % | RESPIRATION RATE: 16 BRPM | HEIGHT: 62 IN | HEART RATE: 104 BPM | TEMPERATURE: 98 F | WEIGHT: 285.06 LBS

## 2025-03-15 DIAGNOSIS — R62.50 UNSPECIFIED LACK OF EXPECTED NORMAL PHYSIOLOGICAL DEVELOPMENT IN CHILDHOOD: ICD-10-CM

## 2025-03-15 DIAGNOSIS — E03.9 HYPOTHYROIDISM, UNSPECIFIED: ICD-10-CM

## 2025-03-15 DIAGNOSIS — Z88.8 ALLERGY STATUS TO OTHER DRUGS, MEDICAMENTS AND BIOLOGICAL SUBSTANCES: ICD-10-CM

## 2025-03-15 DIAGNOSIS — K08.89 OTHER SPECIFIED DISORDERS OF TEETH AND SUPPORTING STRUCTURES: ICD-10-CM

## 2025-03-15 DIAGNOSIS — K02.9 DENTAL CARIES, UNSPECIFIED: ICD-10-CM

## 2025-03-15 PROCEDURE — 99283 EMERGENCY DEPT VISIT LOW MDM: CPT

## 2025-03-15 RX ORDER — AMOXICILLIN AND CLAVULANATE POTASSIUM 500; 125 MG/1; MG/1
1 TABLET, FILM COATED ORAL
Qty: 14 | Refills: 0
Start: 2025-03-15 | End: 2025-03-21

## 2025-03-15 RX ORDER — ACETAMINOPHEN 500 MG/5ML
2 LIQUID (ML) ORAL
Qty: 40 | Refills: 0
Start: 2025-03-15 | End: 2025-03-19

## 2025-03-15 RX ORDER — ACETAMINOPHEN 500 MG/5ML
650 LIQUID (ML) ORAL ONCE
Refills: 0 | Status: COMPLETED | OUTPATIENT
Start: 2025-03-15 | End: 2025-03-15

## 2025-03-15 RX ORDER — AMOXICILLIN AND CLAVULANATE POTASSIUM 500; 125 MG/1; MG/1
1 TABLET, FILM COATED ORAL ONCE
Refills: 0 | Status: COMPLETED | OUTPATIENT
Start: 2025-03-15 | End: 2025-03-15

## 2025-03-15 RX ORDER — IBUPROFEN 200 MG
600 TABLET ORAL ONCE
Refills: 0 | Status: COMPLETED | OUTPATIENT
Start: 2025-03-15 | End: 2025-03-15

## 2025-03-15 RX ADMIN — AMOXICILLIN AND CLAVULANATE POTASSIUM 1 TABLET(S): 500; 125 TABLET, FILM COATED ORAL at 10:13

## 2025-03-15 RX ADMIN — Medication 600 MILLIGRAM(S): at 10:14

## 2025-03-15 RX ADMIN — Medication 650 MILLIGRAM(S): at 10:14

## 2025-03-15 NOTE — ED PROVIDER NOTE - PATIENT PORTAL LINK FT
You can access the FollowMyHealth Patient Portal offered by [facility] by registering at the following website: [email or web address]. By joining [facility]’s FollowMyHealth portal, you will also be able to view your health information using other applications (apps) compatible with our system.

## 2025-03-15 NOTE — ED PROVIDER NOTE - NSFOLLOWUPCLINICS_GEN_ALL_ED_FT
[facility]  Dental  [address] [place] [id number]  Phone: [phone]  Fax:     Oral & Maxillofacial Surgery  Department of Dental Medicine  [phone] [place], [place] [id number]  Phone: [phone]  Fax: [phone]

## 2025-03-15 NOTE — ED ADULT NURSE NOTE - CHIEF COMPLAINT QUOTE
Post-Care Instructions: I reviewed with the patient in detail post-care instructions. Patient is to wear sunprotection, and avoid picking at any of the treated lesions. Pt may apply Vaseline to crusted or scabbing areas. Total Number Of Aks Treated: 6 Detail Level: Zone Render In Bullet Format When Appropriate: No Duration Of Freeze Thaw-Cycle (Seconds): 0 Consent: The patient's consent was obtained including but not limited to risks of crusting, scabbing, blistering, scarring, darker or lighter pigmentary change, recurrence, incomplete removal and infection. send from [facility] for left side dental pain . h/o bipolar disorder, PPD, DM

## 2025-03-15 NOTE — ED PROVIDER NOTE - PHYSICAL EXAMINATION
General appearance: Nontoxic appearing, conversant, afebrile    Eyes: anicteric sclerae, PROMISE, EOMI   HENT: Atraumatic; oropharynx clear, MMM and no ulcerations, no pharyngeal erythema or exudate, +poor dentition with a few missing teeth and multiple dental caries, no dental abscess or swelling   Neck: Trachea midline; Full range of motion, supple   Pulm: normal respiratory effort and no intercostal retractions, normal work of breathing   Extremities: No peripheral edema, no gross deformities, FROM x4   Skin: Dry, normal temperature, turgor and texture; no rash   Psych: Appropriate affect, cooperative

## 2025-03-15 NOTE — ED PROVIDER NOTE - CLINICAL SUMMARY MEDICAL DECISION MAKING FREE TEXT BOX
43yo female with pmh bpd, hypothyroidism, developmental delay, presenting with dental pain.  Pain in left upper molar for some time.  Wednesday had filling to the tooth done and today is still having pain so came to ED.  No fever, swelling, trauma.  States pain worse with cold and chewing on the tooth.  On exam there is no signs of abscess.  Poor dentition, likely exposed nerve.  No trauma or fx.  Will medicate and dc with dental follow up.

## 2025-03-15 NOTE — ED ADULT NURSE NOTE - OBJECTIVE STATEMENT
Pt BIB EMS from group home accompanied by staff Amanda c/o L upper tooth pain x "few weeks". Pt reports  having affected tooth filled on Wednesday pt c/o pain when eating or drinking. Pt denies fever, chills, N/V, or discharge from ear or tooth.

## 2025-03-15 NOTE — ED PROVIDER NOTE - NSFOLLOWUPINSTRUCTIONS_ED_ALL_ED_FT
Follow up with dental  Rest, drink plenty of fluids  Advance activity as tolerated  Continue all previously prescribed medications as directed  Follow up with your PMD - bring copies of your results  Return to the ER for severe pain, fevers, worsening symptoms, or other new or concerning symptoms   For pain, you may take Tylenol 975mg every six hours and supplement with ibuprofen 600mg, with food, every six hours which can be taken three hours apart from the Tylenol to have a layered effect.  Take antibiotics as prescribed

## 2025-03-15 NOTE — ED PROVIDER NOTE - NS ED MD DISPO DISCHARGE CCDA
Reports persistent dry cough  Guaifenesin PRN for cough     Patient/Caregiver provided printed discharge information.

## 2025-03-19 ENCOUNTER — APPOINTMENT (OUTPATIENT)
Dept: SURGICAL ONCOLOGY | Facility: CLINIC | Age: 45
End: 2025-03-19
Payer: MEDICARE

## 2025-03-19 VITALS
WEIGHT: 182 LBS | HEART RATE: 80 BPM | HEIGHT: 62 IN | SYSTOLIC BLOOD PRESSURE: 180 MMHG | BODY MASS INDEX: 33.49 KG/M2 | OXYGEN SATURATION: 98 % | DIASTOLIC BLOOD PRESSURE: 98 MMHG

## 2025-03-19 DIAGNOSIS — R92.8 OTHER ABNORMAL AND INCONCLUSIVE FINDINGS ON DIAGNOSTIC IMAGING OF BREAST: ICD-10-CM

## 2025-03-19 PROCEDURE — 99214 OFFICE O/P EST MOD 30 MIN: CPT

## 2025-03-20 ENCOUNTER — OUTPATIENT (OUTPATIENT)
Dept: OUTPATIENT SERVICES | Facility: HOSPITAL | Age: 45
LOS: 1 days | End: 2025-03-20

## 2025-03-20 ENCOUNTER — RESULT REVIEW (OUTPATIENT)
Age: 45
End: 2025-03-20

## 2025-03-20 ENCOUNTER — APPOINTMENT (OUTPATIENT)
Dept: OBGYN | Facility: HOSPITAL | Age: 45
End: 2025-03-20
Payer: MEDICARE

## 2025-03-20 VITALS
WEIGHT: 283 LBS | TEMPERATURE: 97.3 F | HEIGHT: 62 IN | DIASTOLIC BLOOD PRESSURE: 74 MMHG | SYSTOLIC BLOOD PRESSURE: 98 MMHG | HEART RATE: 108 BPM | BODY MASS INDEX: 52.08 KG/M2

## 2025-03-20 DIAGNOSIS — Z01.419 ENCOUNTER FOR GYNECOLOGICAL EXAMINATION (GENERAL) (ROUTINE) WITHOUT ABNORMAL FINDINGS: ICD-10-CM

## 2025-03-20 DIAGNOSIS — B37.31 ACUTE CANDIDIASIS OF VULVA AND VAGINA: ICD-10-CM

## 2025-03-20 DIAGNOSIS — Z01.419 ENCOUNTER FOR GYNECOLOGICAL EXAMINATION (GENERAL) (ROUTINE) W/OUT ABNORMAL FINDINGS: ICD-10-CM

## 2025-03-20 LAB
CANDIDA AB TITR SER: SIGNIFICANT CHANGE UP
G VAGINALIS DNA SPEC QL NAA+PROBE: SIGNIFICANT CHANGE UP

## 2025-03-20 PROCEDURE — 99396 PREV VISIT EST AGE 40-64: CPT | Mod: GY

## 2025-03-20 RX ORDER — FLUCONAZOLE 150 MG/1
150 TABLET ORAL
Qty: 2 | Refills: 1 | Status: ACTIVE | COMMUNITY
Start: 2025-03-20 | End: 1900-01-01

## 2025-05-05 ENCOUNTER — APPOINTMENT (OUTPATIENT)
Dept: ENDOCRINOLOGY | Facility: CLINIC | Age: 45
End: 2025-05-05
Payer: MEDICARE

## 2025-05-05 VITALS
DIASTOLIC BLOOD PRESSURE: 68 MMHG | OXYGEN SATURATION: 96 % | SYSTOLIC BLOOD PRESSURE: 102 MMHG | HEART RATE: 110 BPM | BODY MASS INDEX: 50.85 KG/M2 | WEIGHT: 278 LBS

## 2025-05-05 DIAGNOSIS — E22.1 HYPERPROLACTINEMIA: ICD-10-CM

## 2025-05-05 DIAGNOSIS — E03.9 HYPOTHYROIDISM, UNSPECIFIED: ICD-10-CM

## 2025-05-05 DIAGNOSIS — R73.03 PREDIABETES.: ICD-10-CM

## 2025-05-05 DIAGNOSIS — E11.9 TYPE 2 DIABETES MELLITUS W/OUT COMPLICATIONS: ICD-10-CM

## 2025-05-05 DIAGNOSIS — E66.813 OBESITY, CLASS 3: ICD-10-CM

## 2025-05-05 LAB — HBA1C MFR BLD HPLC: 5.8

## 2025-05-05 PROCEDURE — 83036 HEMOGLOBIN GLYCOSYLATED A1C: CPT | Mod: QW

## 2025-05-05 PROCEDURE — G2211 COMPLEX E/M VISIT ADD ON: CPT

## 2025-05-05 PROCEDURE — 99214 OFFICE O/P EST MOD 30 MIN: CPT

## 2025-05-06 LAB
ALBUMIN SERPL ELPH-MCNC: 3.6 G/DL
ALP BLD-CCNC: 72 U/L
ALT SERPL-CCNC: 39 U/L
ANION GAP SERPL CALC-SCNC: 10 MMOL/L
AST SERPL-CCNC: 35 U/L
BILIRUB SERPL-MCNC: 0.2 MG/DL
BUN SERPL-MCNC: 16 MG/DL
CALCIUM SERPL-MCNC: 9.3 MG/DL
CHLORIDE SERPL-SCNC: 111 MMOL/L
CO2 SERPL-SCNC: 23 MMOL/L
CREAT SERPL-MCNC: 1.53 MG/DL
EGFRCR SERPLBLD CKD-EPI 2021: 42 ML/MIN/1.73M2
GLUCOSE SERPL-MCNC: 93 MG/DL
POTASSIUM SERPL-SCNC: 4.2 MMOL/L
PROLACTIN SERPL-MCNC: 23.3 NG/ML
PROT SERPL-MCNC: 7 G/DL
SODIUM SERPL-SCNC: 144 MMOL/L
T4 FREE SERPL-MCNC: 1 NG/DL
TSH SERPL-ACNC: 1.22 UIU/ML

## 2025-05-30 NOTE — ED ADULT NURSE NOTE - PAIN: PRESENCE, MLM
Pt walked in with c/o SOB, chest pain, nausea, dizziness x 2days. Fever upon arrival to ED. Recently started metoprolol for HTN  
complains of pain/discomfort

## 2025-06-25 NOTE — ED PROVIDER NOTE - CARE PLAN
Principal Discharge DX:	Situational disturbance  Secondary Diagnosis:	Bipolar disorder parents and siblings

## 2025-07-01 NOTE — ED PROVIDER NOTE - PATIENT PORTAL LINK FT
Unable to reach Patient  after two attempts.  Messages left on voicemail to call back, advised to call 911 if experiencing a life threatening emergency.   Reason for Disposition  • Message left on unidentified voice mail.  Phone number verified.    Protocols used: No Contact or Duplicate Contact Call-A-     You can access the FollowMyHealth Patient Portal offered by [facility] by registering at the following website: [email or web address]. By joining [email or web address]’s FollowMyHealth portal, you will also be able to view your health information using other applications (apps) compatible with our system.

## 2025-07-23 NOTE — ED PROVIDER NOTE - RESPIRATORY, MLM
patient not meeting urine output parameter, one missed void documented
No
Breath sounds clear and equal bilaterally.